# Patient Record
Sex: MALE | Race: WHITE | NOT HISPANIC OR LATINO
[De-identification: names, ages, dates, MRNs, and addresses within clinical notes are randomized per-mention and may not be internally consistent; named-entity substitution may affect disease eponyms.]

---

## 2019-07-18 ENCOUNTER — RESULT REVIEW (OUTPATIENT)
Age: 64
End: 2019-07-18

## 2020-02-20 PROBLEM — Z00.00 ENCOUNTER FOR PREVENTIVE HEALTH EXAMINATION: Status: ACTIVE | Noted: 2020-02-20

## 2020-03-11 ENCOUNTER — APPOINTMENT (OUTPATIENT)
Dept: VASCULAR SURGERY | Facility: CLINIC | Age: 65
End: 2020-03-11
Payer: MEDICARE

## 2020-03-11 VITALS
HEIGHT: 70 IN | SYSTOLIC BLOOD PRESSURE: 164 MMHG | DIASTOLIC BLOOD PRESSURE: 80 MMHG | BODY MASS INDEX: 22.9 KG/M2 | WEIGHT: 160 LBS | TEMPERATURE: 97.9 F | HEART RATE: 89 BPM

## 2020-03-11 DIAGNOSIS — Z86.79 PERSONAL HISTORY OF OTHER DISEASES OF THE CIRCULATORY SYSTEM: ICD-10-CM

## 2020-03-11 DIAGNOSIS — Z86.39 PERSONAL HISTORY OF OTHER ENDOCRINE, NUTRITIONAL AND METABOLIC DISEASE: ICD-10-CM

## 2020-03-11 DIAGNOSIS — Z71.9 COUNSELING, UNSPECIFIED: ICD-10-CM

## 2020-03-11 DIAGNOSIS — Z82.49 FAMILY HISTORY OF ISCHEMIC HEART DISEASE AND OTHER DISEASES OF THE CIRCULATORY SYSTEM: ICD-10-CM

## 2020-03-11 DIAGNOSIS — Z87.448 PERSONAL HISTORY OF OTHER DISEASES OF URINARY SYSTEM: ICD-10-CM

## 2020-03-11 PROCEDURE — 93985 DUP-SCAN HEMO COMPL BI STD: CPT

## 2020-03-11 PROCEDURE — 99203 OFFICE O/P NEW LOW 30 MIN: CPT

## 2020-03-11 NOTE — ASSESSMENT
[FreeTextEntry1] : CKD stage IV, not on HD\par \par patient need permanent dialysis access for future plan. \par His left cephalic is 2.5 mm in forearm and above 3 in upper arm. \par We will plan for a left arm AVF creation as outpatient. \par risks and benefits of this procedure were discussed and pt. agrees to proceed\par

## 2020-03-11 NOTE — HISTORY OF PRESENT ILLNESS
[FreeTextEntry1] : Patient is a 64 yo male with CKD, referred here by his nephrologist for evaluation of fistula creation. He is right handed. He has no pacemaker, permacath, currently no on dialysis.

## 2020-03-24 ENCOUNTER — APPOINTMENT (OUTPATIENT)
Dept: TRANSPLANT | Facility: CLINIC | Age: 65
End: 2020-03-24

## 2020-03-24 ENCOUNTER — APPOINTMENT (OUTPATIENT)
Dept: NEPHROLOGY | Facility: CLINIC | Age: 65
End: 2020-03-24

## 2020-03-25 ENCOUNTER — INPATIENT (INPATIENT)
Facility: HOSPITAL | Age: 65
LOS: 5 days | Discharge: HOME | End: 2020-03-31
Attending: INTERNAL MEDICINE
Payer: MEDICARE

## 2020-03-25 VITALS
TEMPERATURE: 98 F | DIASTOLIC BLOOD PRESSURE: 84 MMHG | RESPIRATION RATE: 16 BRPM | SYSTOLIC BLOOD PRESSURE: 166 MMHG | HEART RATE: 88 BPM | OXYGEN SATURATION: 100 %

## 2020-03-25 DIAGNOSIS — Z90.49 ACQUIRED ABSENCE OF OTHER SPECIFIED PARTS OF DIGESTIVE TRACT: Chronic | ICD-10-CM

## 2020-03-25 DIAGNOSIS — Z29.9 ENCOUNTER FOR PROPHYLACTIC MEASURES, UNSPECIFIED: ICD-10-CM

## 2020-03-25 DIAGNOSIS — N18.9 CHRONIC KIDNEY DISEASE, UNSPECIFIED: ICD-10-CM

## 2020-03-25 DIAGNOSIS — I10 ESSENTIAL (PRIMARY) HYPERTENSION: ICD-10-CM

## 2020-03-25 DIAGNOSIS — E11.9 TYPE 2 DIABETES MELLITUS WITHOUT COMPLICATIONS: ICD-10-CM

## 2020-03-25 DIAGNOSIS — D64.9 ANEMIA, UNSPECIFIED: ICD-10-CM

## 2020-03-25 DIAGNOSIS — N18.5 CHRONIC KIDNEY DISEASE, STAGE 5: ICD-10-CM

## 2020-03-25 DIAGNOSIS — E87.70 FLUID OVERLOAD, UNSPECIFIED: ICD-10-CM

## 2020-03-25 LAB
ALBUMIN SERPL ELPH-MCNC: 3 G/DL — LOW (ref 3.3–5)
ALP SERPL-CCNC: 103 U/L — SIGNIFICANT CHANGE UP (ref 40–120)
ALT FLD-CCNC: 18 U/L — SIGNIFICANT CHANGE UP (ref 4–41)
ANION GAP SERPL CALC-SCNC: 12 MMO/L — SIGNIFICANT CHANGE UP (ref 7–14)
AST SERPL-CCNC: 16 U/L — SIGNIFICANT CHANGE UP (ref 4–40)
BASOPHILS # BLD AUTO: 0.01 K/UL — SIGNIFICANT CHANGE UP (ref 0–0.2)
BASOPHILS NFR BLD AUTO: 0.2 % — SIGNIFICANT CHANGE UP (ref 0–2)
BILIRUB SERPL-MCNC: 0.2 MG/DL — SIGNIFICANT CHANGE UP (ref 0.2–1.2)
BUN SERPL-MCNC: 38 MG/DL — HIGH (ref 7–23)
CALCIUM SERPL-MCNC: 9 MG/DL — SIGNIFICANT CHANGE UP (ref 8.4–10.5)
CHLORIDE SERPL-SCNC: 104 MMOL/L — SIGNIFICANT CHANGE UP (ref 98–107)
CO2 SERPL-SCNC: 24 MMOL/L — SIGNIFICANT CHANGE UP (ref 22–31)
CREAT SERPL-MCNC: 5.42 MG/DL — HIGH (ref 0.5–1.3)
EOSINOPHIL # BLD AUTO: 0.08 K/UL — SIGNIFICANT CHANGE UP (ref 0–0.5)
EOSINOPHIL NFR BLD AUTO: 1.9 % — SIGNIFICANT CHANGE UP (ref 0–6)
GLUCOSE SERPL-MCNC: 223 MG/DL — HIGH (ref 70–99)
HCT VFR BLD CALC: 28.5 % — LOW (ref 39–50)
HGB BLD-MCNC: 9.1 G/DL — LOW (ref 13–17)
IMM GRANULOCYTES NFR BLD AUTO: 0.7 % — SIGNIFICANT CHANGE UP (ref 0–1.5)
LYMPHOCYTES # BLD AUTO: 0.39 K/UL — LOW (ref 1–3.3)
LYMPHOCYTES # BLD AUTO: 9.1 % — LOW (ref 13–44)
MCHC RBC-ENTMCNC: 28.3 PG — SIGNIFICANT CHANGE UP (ref 27–34)
MCHC RBC-ENTMCNC: 31.9 % — LOW (ref 32–36)
MCV RBC AUTO: 88.8 FL — SIGNIFICANT CHANGE UP (ref 80–100)
MONOCYTES # BLD AUTO: 0.27 K/UL — SIGNIFICANT CHANGE UP (ref 0–0.9)
MONOCYTES NFR BLD AUTO: 6.3 % — SIGNIFICANT CHANGE UP (ref 2–14)
NEUTROPHILS # BLD AUTO: 3.52 K/UL — SIGNIFICANT CHANGE UP (ref 1.8–7.4)
NEUTROPHILS NFR BLD AUTO: 81.8 % — HIGH (ref 43–77)
NRBC # FLD: 0 K/UL — SIGNIFICANT CHANGE UP (ref 0–0)
PLATELET # BLD AUTO: 169 K/UL — SIGNIFICANT CHANGE UP (ref 150–400)
PMV BLD: 11 FL — SIGNIFICANT CHANGE UP (ref 7–13)
POTASSIUM SERPL-MCNC: 4.5 MMOL/L — SIGNIFICANT CHANGE UP (ref 3.5–5.3)
POTASSIUM SERPL-SCNC: 4.5 MMOL/L — SIGNIFICANT CHANGE UP (ref 3.5–5.3)
PROT SERPL-MCNC: 6.1 G/DL — SIGNIFICANT CHANGE UP (ref 6–8.3)
RBC # BLD: 3.21 M/UL — LOW (ref 4.2–5.8)
RBC # FLD: 14.7 % — HIGH (ref 10.3–14.5)
SODIUM SERPL-SCNC: 140 MMOL/L — SIGNIFICANT CHANGE UP (ref 135–145)
TROPONIN T, HIGH SENSITIVITY: 40 NG/L — SIGNIFICANT CHANGE UP (ref ?–14)
TROPONIN T, HIGH SENSITIVITY: 44 NG/L — SIGNIFICANT CHANGE UP (ref ?–14)
WBC # BLD: 4.3 K/UL — SIGNIFICANT CHANGE UP (ref 3.8–10.5)
WBC # FLD AUTO: 4.3 K/UL — SIGNIFICANT CHANGE UP (ref 3.8–10.5)

## 2020-03-25 PROCEDURE — 99223 1ST HOSP IP/OBS HIGH 75: CPT

## 2020-03-25 RX ORDER — DEXTROSE 50 % IN WATER 50 %
12.5 SYRINGE (ML) INTRAVENOUS ONCE
Refills: 0 | Status: DISCONTINUED | OUTPATIENT
Start: 2020-03-25 | End: 2020-03-31

## 2020-03-25 RX ORDER — HYDRALAZINE HCL 50 MG
100 TABLET ORAL THREE TIMES A DAY
Refills: 0 | Status: DISCONTINUED | OUTPATIENT
Start: 2020-03-25 | End: 2020-03-31

## 2020-03-25 RX ORDER — GLUCAGON INJECTION, SOLUTION 0.5 MG/.1ML
1 INJECTION, SOLUTION SUBCUTANEOUS ONCE
Refills: 0 | Status: DISCONTINUED | OUTPATIENT
Start: 2020-03-25 | End: 2020-03-31

## 2020-03-25 RX ORDER — CARVEDILOL PHOSPHATE 80 MG/1
25 CAPSULE, EXTENDED RELEASE ORAL EVERY 12 HOURS
Refills: 0 | Status: DISCONTINUED | OUTPATIENT
Start: 2020-03-25 | End: 2020-03-31

## 2020-03-25 RX ORDER — ATORVASTATIN CALCIUM 80 MG/1
40 TABLET, FILM COATED ORAL AT BEDTIME
Refills: 0 | Status: DISCONTINUED | OUTPATIENT
Start: 2020-03-25 | End: 2020-03-31

## 2020-03-25 RX ORDER — AMLODIPINE BESYLATE 2.5 MG/1
10 TABLET ORAL DAILY
Refills: 0 | Status: DISCONTINUED | OUTPATIENT
Start: 2020-03-26 | End: 2020-03-31

## 2020-03-25 RX ORDER — TAMSULOSIN HYDROCHLORIDE 0.4 MG/1
0.4 CAPSULE ORAL AT BEDTIME
Refills: 0 | Status: DISCONTINUED | OUTPATIENT
Start: 2020-03-25 | End: 2020-03-31

## 2020-03-25 RX ORDER — DEXTROSE 50 % IN WATER 50 %
25 SYRINGE (ML) INTRAVENOUS ONCE
Refills: 0 | Status: DISCONTINUED | OUTPATIENT
Start: 2020-03-25 | End: 2020-03-31

## 2020-03-25 RX ORDER — DEXTROSE 50 % IN WATER 50 %
15 SYRINGE (ML) INTRAVENOUS ONCE
Refills: 0 | Status: DISCONTINUED | OUTPATIENT
Start: 2020-03-25 | End: 2020-03-31

## 2020-03-25 RX ORDER — INSULIN LISPRO 100/ML
VIAL (ML) SUBCUTANEOUS
Refills: 0 | Status: DISCONTINUED | OUTPATIENT
Start: 2020-03-25 | End: 2020-03-31

## 2020-03-25 RX ORDER — SODIUM CHLORIDE 9 MG/ML
1000 INJECTION, SOLUTION INTRAVENOUS
Refills: 0 | Status: DISCONTINUED | OUTPATIENT
Start: 2020-03-25 | End: 2020-03-31

## 2020-03-25 RX ORDER — FUROSEMIDE 40 MG
80 TABLET ORAL
Refills: 0 | Status: DISCONTINUED | OUTPATIENT
Start: 2020-03-25 | End: 2020-03-27

## 2020-03-25 RX ORDER — LEVOTHYROXINE SODIUM 125 MCG
75 TABLET ORAL DAILY
Refills: 0 | Status: DISCONTINUED | OUTPATIENT
Start: 2020-03-26 | End: 2020-03-31

## 2020-03-25 RX ADMIN — CARVEDILOL PHOSPHATE 25 MILLIGRAM(S): 80 CAPSULE, EXTENDED RELEASE ORAL at 18:44

## 2020-03-25 RX ADMIN — Medication 80 MILLIGRAM(S): at 18:45

## 2020-03-25 RX ADMIN — TAMSULOSIN HYDROCHLORIDE 0.4 MILLIGRAM(S): 0.4 CAPSULE ORAL at 22:34

## 2020-03-25 RX ADMIN — Medication 100 MILLIGRAM(S): at 18:45

## 2020-03-25 RX ADMIN — ATORVASTATIN CALCIUM 40 MILLIGRAM(S): 80 TABLET, FILM COATED ORAL at 22:33

## 2020-03-25 NOTE — ED ADULT NURSE NOTE - NSIMPLEMENTINTERV_GEN_ALL_ED
Implemented All Universal Safety Interventions:  [place] to call system. Call bell, personal items and telephone within reach. Instruct patient to call for assistance. Room bathroom lighting operational. Non-slip footwear when patient is off stretcher. Physically safe environment: no spills, clutter or unnecessary equipment. Stretcher in lowest position, wheels locked, appropriate side rails in place.

## 2020-03-25 NOTE — CONSULT NOTE ADULT - ASSESSMENT
65 yo M PMH CKD stage 5, htn, dm present with worsen weakness, LE edema, poor appetite x several weeks, noted have worsen renal function. plan to initiate HD this admission    CKD stage 5 not on HD   now with worsen renal function, fluid overload and weakness, planning to initiate HD on this admission  please call IR for permacath tmr  HD after access  sent hep panel  fluid overloaded, start lasix 80mg bid iv  monitor bmp, fluid status  consent for HD in chart. all questions answered    HTN  resume home meds  diuresis   monitor    anemia  likely sec to renal failure  check iron panel  will start epo when on hd  monitor hb    ckd-mbd  check pth and phos level  monitor 63 yo M PMH CKD stage 5, htn, dm present with worsen weakness, LE edema, poor appetite x several weeks, noted have worsen renal function. plan to initiate HD this admission    CKD stage 5 not on HD   now with worsen renal function, fluid overload and weakness, planning to initiate HD on this admission  please call IR for permacath tmrw  HD after access  sent hep panel  fluid overloaded, start lasix 80mg IV BID  monitor bmp, fluid status  consent for HD in chart. all questions answered    HTN  resume home meds  diuresis   monitor    Anemia  likely sec to renal failure  check iron panel  will start epo when on hd  monitor hb    Ckd-mbd  check pth and phos level  monitor

## 2020-03-25 NOTE — H&P ADULT - HISTORY OF PRESENT ILLNESS
64M w/CKD V, HTN, DMII p/w worsening LE edema and generalized weakness for past several weeks. Pt reports he was scheduled to get AVF for HD at end of February, however it was postponed. He follows with Dr. Ousmane metzger.   Currently laying in stretcher, denies cp, sob, palpitations, lightheadedness, dizziness, abd pain, nvd. Reports decreased urine output even in with PO Lasix.    Vital Signs Last 24 Hrs  T(C): 36.7 (25 Mar 2020 13:28), Max: 36.7 (25 Mar 2020 12:19)  T(F): 98.1 (25 Mar 2020 13:28), Max: 98.1 (25 Mar 2020 12:19)  HR: 87 (25 Mar 2020 13:28) (87 - 88)  BP: 151/89 (25 Mar 2020 13:28) (151/89 - 166/84)  BP(mean): --  RR: 16 (25 Mar 2020 13:28) (16 - 16)  SpO2: 100% (25 Mar 2020 13:28) (100% - 100%)    ERROR in vital sign flowsheet from 1:28PM, pt is NOT on NRB, he is saturating 100% on room air. Nursing notified and repeat full VS.

## 2020-03-25 NOTE — ED ADULT NURSE NOTE - OBJECTIVE STATEMENT
Pt A+OX3 c/o intermittent CP with shannan LE edema and weakness for weeks.  Says his kidneys are failing and needs dialysis.  Denies pain presently. 4+ shannan pedal edema noted.  Labs obtained and sent as ordered.  #20g SL R AC placed.  EKG done.  CM placed

## 2020-03-25 NOTE — H&P ADULT - NSHPPHYSICALEXAM_GEN_ALL_CORE
.  VITAL SIGNS:  T(C): 36.7 (03-25-20 @ 13:28), Max: 36.7 (03-25-20 @ 12:19)  T(F): 98.1 (03-25-20 @ 13:28), Max: 98.1 (03-25-20 @ 12:19)  HR: 87 (03-25-20 @ 13:28) (87 - 88)  BP: 151/89 (03-25-20 @ 13:28) (151/89 - 166/84)  BP(mean): --  RR: 16 (03-25-20 @ 13:28) (16 - 16)  SpO2: 100% (03-25-20 @ 13:28) (100% - 100%)  Wt(kg): --    PHYSICAL EXAM:    Constitutional: WDWN resting comfortably in bed; NAD  Head: NC/AT  Eyes: PERRL, EOMI, clear conjunctiva  ENT: no nasal discharge; MMM  Neck: supple  Respiratory: CTA B/L; no W/R/R  Cardiac: +S1/S2; RRR; no M/R/G  Gastrointestinal: soft, NT/ND  Extremities: WWP, no clubbing or cyanosis; +2 pitting edema LE b/l   Musculoskeletal: NROM x4; no joint swelling, tenderness or erythema  Dermatologic: skin warm, dry and intact  Neurologic: AAOx3; CNII-XII grossly intact; no focal deficits  Psychiatric: affect and characteristics of appearance, verbalizations, behaviors are appropriate

## 2020-03-25 NOTE — ED PROVIDER NOTE - OBJECTIVE STATEMENT
63 yo M PMH CKD (recent Cr 4s), getting prepared for HD p/w fatigue x several weeks.  +bilat LE swelling.  Had CP earlier but not today.  Denies syncope.  Denies fever or cough.

## 2020-03-25 NOTE — H&P ADULT - NSHPLABSRESULTS_GEN_ALL_CORE
.  LABS:                         9.1    4.30  )-----------( 169      ( 25 Mar 2020 13:10 )             28.5     03-25    140  |  104  |  38<H>  ----------------------------<  223<H>  4.5   |  24  |  5.42<H>    Ca    9.0      25 Mar 2020 13:10    TPro  6.1  /  Alb  3.0<L>  /  TBili  0.2  /  DBili  x   /  AST  16  /  ALT  18  /  AlkPhos  103  03-25                  RADIOLOGY, EKG & ADDITIONAL TESTS: Reviewed.

## 2020-03-25 NOTE — ED PROVIDER NOTE - CLINICAL SUMMARY MEDICAL DECISION MAKING FREE TEXT BOX
Mason: Pt most likely suffering from ongoing symptoms of CKD.  Will check Cr again and call pt's nephrologist.  I doubt this is ACS and pt has no CP now; will eval further with trop.

## 2020-03-25 NOTE — ED PROVIDER NOTE - ATTENDING CONTRIBUTION TO CARE
64M p/w worsening general weakness, malaise x several weeks.  Also notes his legs are more swollen for the past several days.  no SOB or cough or fever, unlikely COVID, more likely progression of chronic Kidney disease.  No vomiting or LOC.  Pt had appt to get fistula or other vascular access Mar 30 or so but was notified it was cancelled in light of amb surg getting cancelled due to coronavirus pandemic.  No distress here.  to be d/w nephr, they advised admission for initiation of HD.  VS:  unremarkable except HTN    GEN - NAD;  malaise;   A+O x3   HEAD - NC/AT     ENT - PEERL, EOMI, mucous membranes    moist , no discharge      NECK: Neck supple, non-tender without lymphadenopathy, no masses, no JVD  PULM - CTA b/l,  symmetric breath sounds  COR -  normal heart sounds    ABD - , ND, NT, soft,  BACK - no CVA tenderness, nontender spine     EXTREMS - (+)2 pitting edema, no deformity, warm and well perfused  no warmth or redness to suggest cellulitis.   SKIN - no rash    or bruising      NEUROLOGIC - alert, face symmetric, speech fluent, sensation nl, motor no focal deficit.

## 2020-03-25 NOTE — CONSULT NOTE ADULT - SUBJECTIVE AND OBJECTIVE BOX
[facility]   MD MARY LOU ABDUL DO ANAM SIDDIQUI ANGELA WONG, PA    TEL:  OFFICE: [phone]  DR MEANS CELL: [phone]  DR. YOUNG CELL: [phone]  DR. ARZATE CELL: [phone]  HEIDI LINARES CELL: [phone]    HPI:  63 yo M PMH CKD stage 5, htn, dm present with worsen weakness, LE edema, poor appetite x several weeks. pt follows up with dr. means for ckd management. advised to get AVF for dialysis.  Denies syncope, sob, cp, fever or cough.    Allergies:  No Known Allergies      PAST MEDICAL & SURGICAL HISTORY:      Home Medications Reviewed    Hospital Medications:   MEDICATIONS  (STANDING):      SOCIAL HISTORY:  Denies ETOh, Smoking,     FAMILY HISTORY:      REVIEW OF SYSTEMS:  CONSTITUTIONAL: + weakness, no fevers or chills  EYES/ENT: No visual changes;  No vertigo or throat pain   NECK: No pain or stiffness  RESPIRATORY: No cough, wheezing, hemoptysis; No shortness of breath  CARDIOVASCULAR: No chest pain or palpitations.  GASTROINTESTINAL: No abdominal or epigastric pain. No nausea, vomiting, or hematemesis; No diarrhea or constipation. No melena or hematochezia.  GENITOURINARY: No dysuria, frequency, foamy urine, urinary urgency, incontinence or hematuria  NEUROLOGICAL: + weakness  SKIN: No itching, burning, rashes, or lesions   VASCULAR: + bilateral lower extremity edema.   All other review of systems is negative unless indicated above.    VITALS:  T(F): 98.1 (03-25-20 @ 13:28), Max: 98.1 (03-25-20 @ 12:19)  HR: 87 (03-25-20 @ 13:28)  BP: 151/89 (03-25-20 @ 13:28)  RR: 16 (03-25-20 @ 13:28)  SpO2: 100% (03-25-20 @ 13:28)  Wt(kg): --        PHYSICAL EXAM:  Constitutional: NAD  HEENT: anicteric sclera, oropharynx clear, MMM  Neck: No JVD  Respiratory: CTAB, no wheezes, rales or rhonchi  Cardiovascular: S1, S2, RRR  Gastrointestinal: BS+, soft, NT/ND  Extremities: 2+ peripheral edema  Neurological: A/O x 3, no focal deficits  Psychiatric: Normal mood, normal affect  : No CVA tenderness. No pemberton.   Skin: No rashes      LABS:  03-25    140  |  104  |  38<H>  ----------------------------<  223<H>  4.5   |  24  |  5.42<H>    Ca    9.0      25 Mar 2020 13:10    TPro  6.1  /  Alb  3.0<L>  /  TBili  0.2  /  DBili      /  AST  16  /  ALT  18  /  AlkPhos  103  03-25    Creatinine Trend: 5.42 <--                        9.1    4.30  )-----------( 169      ( 25 Mar 2020 13:10 )             28.5     Urine Studies:        RADIOLOGY & ADDITIONAL STUDIES: [facility] [place] [facility]   MD MARY LOU ABDUL DO ANAM SIDDIQUI ANGELA WONG, PA    TEL:  OFFICE: [phone]  DR MEANS CELL: [phone]  DR. YOUNG CELL: [phone]  DR. ARZATE CELL: [phone]  HEIDI LINARES CELL: [phone]    HPI:  65 yo M PMH CKD stage 5, htn, dm present with worsen weakness, LE edema, poor appetite x several weeks. pt follows up with dr. means for ckd management. advised to get AVF for dialysis.  Denies syncope, sob, cp, fever or cough.    Allergies:  No Known Allergies      PAST MEDICAL & SURGICAL HISTORY:      Home Medications Reviewed    Hospital Medications:   MEDICATIONS  (STANDING):      SOCIAL HISTORY:  Denies ETOh, Smoking,     FAMILY HISTORY:      REVIEW OF SYSTEMS:  CONSTITUTIONAL: + weakness, no fevers or chills  EYES/ENT: No visual changes;  No vertigo or throat pain   NECK: No pain or stiffness  RESPIRATORY: No cough, wheezing, hemoptysis; No shortness of breath  CARDIOVASCULAR: No chest pain or palpitations.  GASTROINTESTINAL: No abdominal or epigastric pain. No nausea, vomiting, or hematemesis; No diarrhea or constipation. No melena or hematochezia.  GENITOURINARY: No dysuria, frequency, foamy urine, urinary urgency, incontinence or hematuria  NEUROLOGICAL: + weakness  SKIN: No itching, burning, rashes, or lesions   VASCULAR: + bilateral lower extremity edema.   All other review of systems is negative unless indicated above.    VITALS:  T(F): 98.1 (03-25-20 @ 13:28), Max: 98.1 (03-25-20 @ 12:19)  HR: 87 (03-25-20 @ 13:28)  BP: 151/89 (03-25-20 @ 13:28)  RR: 16 (03-25-20 @ 13:28)  SpO2: 100% (03-25-20 @ 13:28)  Wt(kg): --        PHYSICAL EXAM:  Constitutional: NAD  HEENT: anicteric sclera, oropharynx clear, MMM  Neck: No JVD  Respiratory: CTAB, no wheezes, rales or rhonchi  Cardiovascular: S1, S2, RRR  Gastrointestinal: BS+, soft, NT/ND  Extremities: 2+ peripheral edema  Neurological: A/O x 3, no focal deficits  Psychiatric: Normal mood, normal affect  : No CVA tenderness. No pemberton.   Skin: No rashes      LABS:  03-25    140  |  104  |  38<H>  ----------------------------<  223<H>  4.5   |  24  |  5.42<H>    Ca    9.0      25 Mar 2020 13:10    TPro  6.1  /  Alb  3.0<L>  /  TBili  0.2  /  DBili      /  AST  16  /  ALT  18  /  AlkPhos  103  03-25    Creatinine Trend: 5.42 <--                        9.1    4.30  )-----------( 169      ( 25 Mar 2020 13:10 )             28.5

## 2020-03-25 NOTE — H&P ADULT - NSICDXPASTMEDICALHX_GEN_ALL_CORE_FT
PAST MEDICAL HISTORY:  Chronic kidney disease (CKD), stage V     Essential hypertension     Type II diabetes mellitus

## 2020-03-25 NOTE — CHART NOTE - NSCHARTNOTEFT_GEN_A_CORE
PRE-INTERVENTIONAL RADIOLOGY PROCEDURE NOTE    Patient Age: 64   Patient Gender: male  Procedure (including site / side if known): permacath  Diagnosis / Indication: ESRD, HTN, DM   Interventional Radiology Attending Physician: Dr Ho  Ordering Attending Physician: Dr Ramos (Dr Johnston will take over in AM)   Pertinent medical history: ESRD   Pertinent labs: platelet 169   Patient and Family aware? Yes      Attending / Resident / NP / PA   Print Sign Jessica Mcneill NP   Contact #:  [phone]

## 2020-03-25 NOTE — H&P ADULT - ASSESSMENT
64M w/CKD V, HTN, DMII p/w worsening LE edema and generalized weakness, admitted for HD initiation, plan for permacath with IR in AM.

## 2020-03-26 ENCOUNTER — TRANSCRIPTION ENCOUNTER (OUTPATIENT)
Age: 65
End: 2020-03-26

## 2020-03-26 LAB
ANION GAP SERPL CALC-SCNC: 13 MMO/L — SIGNIFICANT CHANGE UP (ref 7–14)
BASOPHILS # BLD AUTO: 0.01 K/UL — SIGNIFICANT CHANGE UP (ref 0–0.2)
BASOPHILS NFR BLD AUTO: 0.3 % — SIGNIFICANT CHANGE UP (ref 0–2)
BUN SERPL-MCNC: 39 MG/DL — HIGH (ref 7–23)
CALCIUM SERPL-MCNC: 8.1 MG/DL — LOW (ref 8.4–10.5)
CHLORIDE SERPL-SCNC: 104 MMOL/L — SIGNIFICANT CHANGE UP (ref 98–107)
CO2 SERPL-SCNC: 23 MMOL/L — SIGNIFICANT CHANGE UP (ref 22–31)
CREAT SERPL-MCNC: 5.49 MG/DL — HIGH (ref 0.5–1.3)
EOSINOPHIL # BLD AUTO: 0.1 K/UL — SIGNIFICANT CHANGE UP (ref 0–0.5)
EOSINOPHIL NFR BLD AUTO: 2.7 % — SIGNIFICANT CHANGE UP (ref 0–6)
FERRITIN SERPL-MCNC: 158.8 NG/ML — SIGNIFICANT CHANGE UP (ref 30–400)
GLUCOSE SERPL-MCNC: 109 MG/DL — HIGH (ref 70–99)
HBV SURFACE AG SER-ACNC: NEGATIVE — SIGNIFICANT CHANGE UP
HCT VFR BLD CALC: 24.4 % — LOW (ref 39–50)
HGB BLD-MCNC: 7.6 G/DL — LOW (ref 13–17)
IMM GRANULOCYTES NFR BLD AUTO: 0.5 % — SIGNIFICANT CHANGE UP (ref 0–1.5)
INR BLD: 0.97 — SIGNIFICANT CHANGE UP (ref 0.88–1.17)
IRON SATN MFR SERPL: 170 UG/DL — SIGNIFICANT CHANGE UP (ref 155–535)
IRON SATN MFR SERPL: 26 UG/DL — LOW (ref 45–165)
LYMPHOCYTES # BLD AUTO: 0.42 K/UL — LOW (ref 1–3.3)
LYMPHOCYTES # BLD AUTO: 11.5 % — LOW (ref 13–44)
MAGNESIUM SERPL-MCNC: 2.7 MG/DL — HIGH (ref 1.6–2.6)
MCHC RBC-ENTMCNC: 28 PG — SIGNIFICANT CHANGE UP (ref 27–34)
MCHC RBC-ENTMCNC: 31.1 % — LOW (ref 32–36)
MCV RBC AUTO: 90 FL — SIGNIFICANT CHANGE UP (ref 80–100)
MONOCYTES # BLD AUTO: 0.29 K/UL — SIGNIFICANT CHANGE UP (ref 0–0.9)
MONOCYTES NFR BLD AUTO: 8 % — SIGNIFICANT CHANGE UP (ref 2–14)
NEUTROPHILS # BLD AUTO: 2.8 K/UL — SIGNIFICANT CHANGE UP (ref 1.8–7.4)
NEUTROPHILS NFR BLD AUTO: 77 % — SIGNIFICANT CHANGE UP (ref 43–77)
NRBC # FLD: 0 K/UL — SIGNIFICANT CHANGE UP (ref 0–0)
PHOSPHATE SERPL-MCNC: 5.7 MG/DL — HIGH (ref 2.5–4.5)
PLATELET # BLD AUTO: 148 K/UL — LOW (ref 150–400)
PMV BLD: 11.1 FL — SIGNIFICANT CHANGE UP (ref 7–13)
POTASSIUM SERPL-MCNC: 3.7 MMOL/L — SIGNIFICANT CHANGE UP (ref 3.5–5.3)
POTASSIUM SERPL-SCNC: 3.7 MMOL/L — SIGNIFICANT CHANGE UP (ref 3.5–5.3)
PROTHROM AB SERPL-ACNC: 11.2 SEC — SIGNIFICANT CHANGE UP (ref 9.8–13.1)
PTH-INTACT SERPL-MCNC: 32.53 PG/ML — SIGNIFICANT CHANGE UP (ref 15–65)
RBC # BLD: 2.71 M/UL — LOW (ref 4.2–5.8)
RBC # FLD: 14.6 % — HIGH (ref 10.3–14.5)
SODIUM SERPL-SCNC: 140 MMOL/L — SIGNIFICANT CHANGE UP (ref 135–145)
UIBC SERPL-MCNC: 144.3 UG/DL — SIGNIFICANT CHANGE UP (ref 110–370)
WBC # BLD: 3.64 K/UL — LOW (ref 3.8–10.5)
WBC # FLD AUTO: 3.64 K/UL — LOW (ref 3.8–10.5)

## 2020-03-26 PROCEDURE — 76937 US GUIDE VASCULAR ACCESS: CPT | Mod: 26

## 2020-03-26 PROCEDURE — 77001 FLUOROGUIDE FOR VEIN DEVICE: CPT | Mod: 26,GC

## 2020-03-26 RX ORDER — IRON SUCROSE 20 MG/ML
100 INJECTION, SOLUTION INTRAVENOUS
Refills: 0 | Status: DISCONTINUED | OUTPATIENT
Start: 2020-03-26 | End: 2020-03-31

## 2020-03-26 RX ADMIN — IRON SUCROSE 100 MILLIGRAM(S): 20 INJECTION, SOLUTION INTRAVENOUS at 18:47

## 2020-03-26 RX ADMIN — Medication 100 MILLIGRAM(S): at 02:43

## 2020-03-26 RX ADMIN — CARVEDILOL PHOSPHATE 25 MILLIGRAM(S): 80 CAPSULE, EXTENDED RELEASE ORAL at 06:25

## 2020-03-26 RX ADMIN — Medication 100 MILLIGRAM(S): at 10:17

## 2020-03-26 RX ADMIN — Medication 2: at 21:58

## 2020-03-26 RX ADMIN — Medication 75 MICROGRAM(S): at 06:24

## 2020-03-26 RX ADMIN — AMLODIPINE BESYLATE 10 MILLIGRAM(S): 2.5 TABLET ORAL at 06:25

## 2020-03-26 RX ADMIN — Medication 80 MILLIGRAM(S): at 06:25

## 2020-03-26 RX ADMIN — Medication 0.1 MILLIGRAM(S): at 10:17

## 2020-03-26 RX ADMIN — Medication 0.1 MILLIGRAM(S): at 00:22

## 2020-03-26 NOTE — PROGRESS NOTE ADULT - SUBJECTIVE AND OBJECTIVE BOX
[facility] [place] [facility]   MD MARY LOU ABDUL DO ANAM SIDDIQUI ANGELA WONG, PA    TEL:  OFFICE: [phone]  DR CHIN CELL: [phone]  DR. YOUNG CELL: [phone]  DR. ARZATE CELL: [phone]  HEIDI LINARES CELL: [phone]        Patient is a 64y old  Male who presents with a chief complaint of weakness, HD initiation (26 Mar 2020 11:23)      Patient seen and examined at bedside. No chest pain/sob    VITALS:  T(F): 98.6 (03-26-20 @ 14:37), Max: 99.1 (03-26-20 @ 06:10)  HR: 79 (03-26-20 @ 14:37)  BP: 157/79 (03-26-20 @ 14:37)  RR: 19 (03-26-20 @ 14:37)  SpO2: 98% (03-26-20 @ 14:37)  Wt(kg): --    03-26 @ 07:01  -  03-26 @ 14:45  --------------------------------------------------------  IN: 0 mL / OUT: 250 mL / NET: -250 mL      Height (cm): 177.8 (03-25 @ 18:21)  Weight (kg): 75 (03-25 @ 18:21)  BMI (kg/m2): 23.7 (03-25 @ 18:21)  BSA (m2): 1.93 (03-25 @ 18:21)    PHYSICAL EXAM:  Constitutional: NAD  Neck: No JVD  Respiratory: CTAB, no wheezes, rales or rhonchi  Cardiovascular: S1, S2, RRR  Gastrointestinal: BS+, soft, NT/ND  Extremities: 1+ peripheral edema    Hospital Medications:   MEDICATIONS  (STANDING):  amLODIPine   Tablet 10 milliGRAM(s) Oral daily  atorvastatin 40 milliGRAM(s) Oral at bedtime  carvedilol 25 milliGRAM(s) Oral every 12 hours  cloNIDine 0.1 milliGRAM(s) Oral two times a day  dextrose 5%. 1000 milliLiter(s) (50 mL/Hr) IV Continuous <Continuous>  dextrose 50% Injectable 12.5 Gram(s) IV Push once  dextrose 50% Injectable 25 Gram(s) IV Push once  dextrose 50% Injectable 25 Gram(s) IV Push once  furosemide   Injectable 80 milliGRAM(s) IV Push two times a day  hydrALAZINE 100 milliGRAM(s) Oral three times a day  insulin lispro (HumaLOG) corrective regimen sliding scale   SubCutaneous Before meals and at bedtime  iron sucrose Injectable 100 milliGRAM(s) IV Push <User Schedule>  levothyroxine 75 MICROGram(s) Oral daily  tamsulosin 0.4 milliGRAM(s) Oral at bedtime      LABS:  03-26    140  |  104  |  39<H>  ----------------------------<  109<H>  3.7   |  23  |  5.49<H>    Ca    8.1<L>      26 Mar 2020 05:50  Phos  5.7     03-26  Mg     2.7     03-26    TPro  6.1  /  Alb  3.0<L>  /  TBili  0.2  /  DBili      /  AST  16  /  ALT  18  /  AlkPhos  103  03-25    Creatinine Trend: 5.49 <--, 5.42 <--    Phosphorus Level, Serum: 5.7 mg/dL (03-26 @ 05:50)  Iron Total, Serum: 26 ug/dL (03-26 @ 05:50)  Ferritin, Serum: 158.8 ng/mL (03-26 @ 05:50)    calcium--  intact pth--  parathyroid hormone intact, serum32.53                            7.6    3.64  )-----------( 148      ( 26 Mar 2020 05:50 )             24.4     Urine Studies:      Iron 26, TIBC 170, %sat --      [03-26-20 @ 05:50]  Ferritin 158.8      [03-26-20 @ 05:50]  PTH 32.53 (Ca --)      [03-26-20 @ 05:50]   --        RADIOLOGY & ADDITIONAL STUDIES:

## 2020-03-26 NOTE — DISCHARGE NOTE PROVIDER - HOSPITAL COURSE
HPI:    64M w/CKD V, HTN, DMII p/w worsening LE edema and generalized weakness for past several weeks. Pt reports he was scheduled to get AVF for HD at end of February, however it was postponed. He follows with Dr. Ousmane metzger.     Currently laying in stretcher, denies cp, sob, palpitations, lightheadedness, dizziness, abd pain, nvd. Reports decreased urine output even in with PO Lasix.            Hospital Course: HPI:    64M w/CKD V, HTN, DMII p/w worsening LE edema and generalized weakness for past several weeks. Pt reports he was scheduled to get AVF for HD at end of February, however it was postponed. He follows with Dr. Ousmane metzger.     Currently laying in stretcher, denies cp, sob, palpitations, lightheadedness, dizziness, abd pain, nvd. Reports decreased urine output even in with PO Lasix.            Hospital Course:    Patient had Permacath placed by IR; he tolerated several sessions of HD following Permacath placement. For LE edema, he was initially started on Lasix 80mg IVP BID, but was switched to 80mg Lasix PO once daily after undergoing sessions of dialysis. Patient was followed by nephrology and cardiology during this admission. TTE was performed showing preserved LV function.         Patient now ready for discharge after Permacath placement with initiation of hemodialysis. He tolerated IR procedure and HD; he will be discharged with Lasix 80mg PO once daily and dialysis has been set up on ____ schedule at _____. Patient informed to have close follow up with his nephrologists and PMD. HPI:    64M w/CKD V, HTN, DMII p/w worsening LE edema and generalized weakness for past several weeks. Pt reports he was scheduled to get AVF for HD at end of February, however it was postponed. He follows with Dr. Ousmane metzger.     Currently laying in stretcher, denies cp, sob, palpitations, lightheadedness, dizziness, abd pain, nvd. Reports decreased urine output even in with PO Lasix.            Hospital Course:    Patient had Permacath placed by IR; he tolerated several sessions of HD following Permacath placement. For LE edema, he was initially started on Lasix 80mg IVP BID, but was switched to 80mg Lasix PO once daily after undergoing sessions of dialysis. Patient was followed by nephrology and cardiology during this admission. TTE was performed showing preserved LV function.         Patient now ready for discharge after Permacath placement with initiation of hemodialysis. He tolerated IR procedure and HD; he will be discharged with Lasix 80mg PO once daily and dialysis has been set up. Patient informed to have close follow up with his nephrologists and PMD. HPI:    64M w/CKD V, HTN, DMII p/w worsening LE edema and generalized weakness for past several weeks. Pt reports he was scheduled to get AVF for HD at end of February, however it was postponed. He follows with Dr. Ousmane metzger.     Currently laying in stretcher, denies cp, sob, palpitations, lightheadedness, dizziness, abd pain, nvd. Reports decreased urine output even in with PO Lasix.            Hospital Course:    Patient had Permacath placed by IR; he tolerated several sessions of HD following Permacath placement. For LE edema, he was initially started on Lasix 80mg IVP BID, but was switched to 80mg Lasix PO once daily after undergoing sessions of dialysis. Patient was followed by nephrology and cardiology during this admission. TTE was performed showing preserved LV function.         Patient now ready for discharge after Permacath placement with initiation of hemodialysis. He tolerated IR procedure and HD; he will be discharged with Lasix 80mg PO once daily and dialysis was set up at [facility], [address], [place], [place], [address]. [phone] on Tuesday, Thursday, Saturday at 2:45 pm.         Patient informed to have close follow up with his nephrologists and PMD.

## 2020-03-26 NOTE — CONSULT NOTE ADULT - SUBJECTIVE AND OBJECTIVE BOX
Chace Andrews MD  Interventional Cardiology / Advance Heart Failure and Cardiac Transplant Specialist  [place] Office : [phone] [place] [address] [place]. [address]  Tel:   [place] Office : [address] [facility] [place] [address]  Tel: [phone]  Cell : [phone]      HISTORY OF PRESENTING ILLNESS:  HPI:  64M w/CKD V, HTN, DMII p/w worsening LE edema and generalized weakness for past several weeks. Pt reports he was scheduled to get AVF for HD at end of February, however it was postponed. He follows with Dr. Romeo outpt.   Currently laying in stretcher, denies cp, sob, palpitations, lightheadedness, dizziness, abd pain, nvd. Reports decreased urine output even in with PO Lasix. no cp no SOB      PAST MEDICAL & SURGICAL HISTORY:  Type II diabetes mellitus  Essential hypertension  Chronic kidney disease (CKD), stage V  S/P appendectomy      SOCIAL HISTORY: Substance Use (street drugs): ( x ) never used  (  ) other:    FAMILY HISTORY:      MEDICATIONS:  amLODIPine   Tablet 10 milliGRAM(s) Oral daily  carvedilol 25 milliGRAM(s) Oral every 12 hours  cloNIDine 0.1 milliGRAM(s) Oral two times a day  furosemide   Injectable 80 milliGRAM(s) IV Push two times a day  hydrALAZINE 100 milliGRAM(s) Oral three times a day  tamsulosin 0.4 milliGRAM(s) Oral at bedtime  atorvastatin 40 milliGRAM(s) Oral at bedtime  dextrose 40% Gel 15 Gram(s) Oral once PRN  dextrose 50% Injectable 12.5 Gram(s) IV Push once  dextrose 50% Injectable 25 Gram(s) IV Push once  dextrose 50% Injectable 25 Gram(s) IV Push once  glucagon  Injectable 1 milliGRAM(s) IntraMuscular once PRN  insulin lispro (HumaLOG) corrective regimen sliding scale   SubCutaneous Before meals and at bedtime  levothyroxine 75 MICROGram(s) Oral daily    dextrose 5%. 1000 milliLiter(s) IV Continuous <Continuous>  iron sucrose Injectable 100 milliGRAM(s) IV Push <User Schedule>      FAMILY HISTORY:        Allergies    No Known Allergies    Intolerances    	      PHYSICAL EXAM:  T(C): 37.2 (03-26-20 @ 19:30), Max: 37.3 (03-26-20 @ 06:10)  HR: 92 (03-26-20 @ 19:30) (79 - 92)  BP: 160/90 (03-26-20 @ 19:30) (142/68 - 160/90)  RR: 18 (03-26-20 @ 19:30) (17 - 22)  SpO2: 98% (03-26-20 @ 14:37) (92% - 98%)  Wt(kg): --  I&O's Summary    26 Mar 2020 07:01  -  26 Mar 2020 21:37  --------------------------------------------------------  IN: 400 mL / OUT: 1650 mL / NET: -1250 mL          EYES: EOMI, PERRLA, conjunctiva and sclera clear  ENMT: No tonsillar erythema, exudates, or enlargement; Moist mucous membranes, Good dentition, No lesions  Cardiovascular: Normal S1 S2, No JVD, No murmurs, No edema  Respiratory: Lungs clear to auscultation	  Gastrointestinal:  Soft, Non-tender, + BS	  Extremities: 2+ edema      LABS:	 	    CARDIAC MARKERS:                                  7.6    3.64  )-----------( 148      ( 26 Mar 2020 05:50 )             24.4     03-26    140  |  104  |  39<H>  ----------------------------<  109<H>  3.7   |  23  |  5.49<H>    Ca    8.1<L>      26 Mar 2020 05:50  Phos  5.7     03-26  Mg     2.7     03-26    TPro  6.1  /  Alb  3.0<L>  /  TBili  0.2  /  DBili  x   /  AST  16  /  ALT  18  /  AlkPhos  103  03-25    proBNP:   Lipid Profile:   HgA1c:   TSH:     Consultant(s) Notes Reviewed:  [x ] YES  [ ] NO    Care Discussed with Consultants/Other Providers [ x] YES  [ ] NO    Imaging Personally Reviewed independently:  [x] YES  [ ] NO    All labs, radiologic studies, vitals, orders and medications list reviewed. Patient is seen and examined at bedside. Case discussed with medical team.    ASSESSMENT/PLAN:

## 2020-03-26 NOTE — PROGRESS NOTE ADULT - SUBJECTIVE AND OBJECTIVE BOX
Saravanan Viveros MD  Internal Medicine, PGY-1  Beeper: [phone] ([facility])/ [id number] ([facility])     Subjective:    Patient is a 64y old  Male who presents with a chief complaint of weakness, HD initiation (25 Mar 2020 16:33)    No acute overnight events.  Patient was seen and examined at bedside this AM. Denied fever, chills, nausea, vomiting, CP, palpitations, coughing, wheezing, SOB, and abdominal pain.      MEDICATIONS  (STANDING):  amLODIPine   Tablet 10 milliGRAM(s) Oral daily  atorvastatin 40 milliGRAM(s) Oral at bedtime  carvedilol 25 milliGRAM(s) Oral every 12 hours  cloNIDine 0.1 milliGRAM(s) Oral two times a day  dextrose 5%. 1000 milliLiter(s) (50 mL/Hr) IV Continuous <Continuous>  dextrose 50% Injectable 12.5 Gram(s) IV Push once  dextrose 50% Injectable 25 Gram(s) IV Push once  dextrose 50% Injectable 25 Gram(s) IV Push once  furosemide   Injectable 80 milliGRAM(s) IV Push two times a day  hydrALAZINE 100 milliGRAM(s) Oral three times a day  insulin lispro (HumaLOG) corrective regimen sliding scale   SubCutaneous Before meals and at bedtime  levothyroxine 75 MICROGram(s) Oral daily  tamsulosin 0.4 milliGRAM(s) Oral at bedtime    MEDICATIONS  (PRN):  dextrose 40% Gel 15 Gram(s) Oral once PRN Blood Glucose LESS THAN 70 milliGRAM(s)/deciliter  glucagon  Injectable 1 milliGRAM(s) IntraMuscular once PRN Glucose LESS THAN 70 milligrams/deciliter      Objective:    Vitals: Vital Signs Last 24 Hrs  T(C): 37.3 (03-26-20 @ 06:10), Max: 37.3 (03-26-20 @ 06:10)  T(F): 99.1 (03-26-20 @ 06:10), Max: 99.1 (03-26-20 @ 06:10)  HR: 82 (03-26-20 @ 06:10) (82 - 90)  BP: 144/70 (03-26-20 @ 06:10) (144/70 - 170/80)  BP(mean): --  RR: 18 (03-26-20 @ 06:10) (16 - 22)  SpO2: 94% (03-26-20 @ 06:10) (94% - 100%)              I&O's Summary        PHYSICAL EXAM:  GENERAL: NAD, well-groomed, well-developed  HEAD:  Atraumatic, Normocephalic  EYES: PERRLA, conjunctiva and sclera clear  ENMT: No tonsillar erythema, exudates, or enlargement; Moist mucous membranes, Good dentition, No lesions  NECK: Supple, No JVD, Normal thyroid  CHEST/LUNG: Clear to auscultation bilaterally; No rales, rhonchi, wheezing, or rubs  HEART: Regular rate and rhythm; No murmurs, rubs, or gallops  ABDOMEN: Soft, Nontender, Nondistended; Bowel sounds present  EXTREMITIES:  2+ Peripheral Pulses, No clubbing, cyanosis, or edema  LYMPH: No lymphadenopathy noted  SKIN: No rashes or lesions noted  NERVOUS SYSTEM:  Alert & Oriented X3, Good concentration; Motor Strength 5/5 B/L upper and lower extremities; DTRs 2+ intact and symmetric                                               LABS:  03-25    140  |  104  |  38<H>  ----------------------------<  223<H>  4.5   |  24  |  5.42<H>    Ca    9.0      25 Mar 2020 13:10  Phos  5.7     03-26    TPro  6.1  /  Alb  3.0<L>  /  TBili  0.2  /  DBili  x   /  AST  16  /  ALT  18  /  AlkPhos  103  03-25      PT/INR - ( 26 Mar 2020 05:50 )   PT: 11.2 SEC;   INR: 0.97                                                        7.6    3.64  )-----------( 148      ( 26 Mar 2020 05:50 )             24.4                         9.1    4.30  )-----------( 169      ( 25 Mar 2020 13:10 )             28.5     CAPILLARY BLOOD GLUCOSE      POCT Blood Glucose.: 99 mg/dL (26 Mar 2020 07:08)  POCT Blood Glucose.: 189 mg/dL (26 Mar 2020 00:36)  POCT Blood Glucose.: 127 mg/dL (25 Mar 2020 18:18)        RECENT CULTURES:      TELEMETRY:    ECG:    TTE:    RADIOLOGY & ADDITIONAL TESTS:    Imaging Personally Reviewed:  [ ] YES  [ ] NO    Consultants involved in case:   Consultant(s) Notes Reviewed:  [ ] YES  [ ] NO:   Care Discussed with Consultants/Other Providers [ ] YES  [ ] NO Saravanan Viveros MD  Internal Medicine, PGY-1  Beeper: [phone] ([facility])/ [phone] ([facility])     Subjective:    Patient is a 64y old  Male who presents with a chief complaint of weakness, HD initiation (25 Mar 2020 16:33)    No acute overnight events. NPO after midnight for Permacath with IR today.     Patient was seen and examined at bedside this AM. States he feel "okay", reported no complaints. States he is still urinating; has increased since being started on IVP Lasix on admission. Denied fatigue, fever, chills, nausea, vomiting, CP, palpitations, coughing, wheezing, SOB, and abdominal pain.      MEDICATIONS  (STANDING):  amLODIPine   Tablet 10 milliGRAM(s) Oral daily  atorvastatin 40 milliGRAM(s) Oral at bedtime  carvedilol 25 milliGRAM(s) Oral every 12 hours  cloNIDine 0.1 milliGRAM(s) Oral two times a day  dextrose 5%. 1000 milliLiter(s) (50 mL/Hr) IV Continuous <Continuous>  dextrose 50% Injectable 12.5 Gram(s) IV Push once  dextrose 50% Injectable 25 Gram(s) IV Push once  dextrose 50% Injectable 25 Gram(s) IV Push once  furosemide   Injectable 80 milliGRAM(s) IV Push two times a day  hydrALAZINE 100 milliGRAM(s) Oral three times a day  insulin lispro (HumaLOG) corrective regimen sliding scale   SubCutaneous Before meals and at bedtime  levothyroxine 75 MICROGram(s) Oral daily  tamsulosin 0.4 milliGRAM(s) Oral at bedtime    MEDICATIONS  (PRN):  dextrose 40% Gel 15 Gram(s) Oral once PRN Blood Glucose LESS THAN 70 milliGRAM(s)/deciliter  glucagon  Injectable 1 milliGRAM(s) IntraMuscular once PRN Glucose LESS THAN 70 milligrams/deciliter      Objective:    Vitals: Vital Signs Last 24 Hrs  T(C): 37.3 (03-26-20 @ 06:10), Max: 37.3 (03-26-20 @ 06:10)  T(F): 99.1 (03-26-20 @ 06:10), Max: 99.1 (03-26-20 @ 06:10)  HR: 82 (03-26-20 @ 06:10) (82 - 90)  BP: 144/70 (03-26-20 @ 06:10) (144/70 - 170/80)  RR: 18 (03-26-20 @ 06:10) (16 - 22)  SpO2: 94% (03-26-20 @ 06:10) (94% - 100%)                    PHYSICAL EXAM:  GENERAL: Elderly male sleeping comfortably in bed in NAD  HEAD:  Atraumatic, Normocephalic  EYES: conjunctiva and sclera clear  CHEST/LUNG: Clear to auscultation bilaterally; No rales, rhonchi, wheezing, or rubs  HEART: Regular rate and rhythm; No murmurs, rubs, or gallops  ABDOMEN: Soft, Nontender, Nondistended; Bowel sounds present  EXTREMITIES: b/l pitting LE edema  NERVOUS SYSTEM: awake and alert              LABS:  Phos  5.7     03-26  PT/INR - ( 26 Mar 2020 05:50 )   PT: 11.2 SEC;   INR: 0.97                             7.6    3.64  )-----------( 148      ( 26 Mar 2020 05:50 )             24.4                CAPILLARY BLOOD GLUCOSE  POCT Blood Glucose.: 99 mg/dL (26 Mar 2020 07:08)  POCT Blood Glucose.: 189 mg/dL (26 Mar 2020 00:36)  POCT Blood Glucose.: 127 mg/dL (25 Mar 2020 18:18) Saravanan Viveros MD  Internal Medicine, PGY-1  Beeper: [phone] ([facility])/ [phone] ([facility])     Subjective:    Patient is a 64y old  Male who presents with a chief complaint of weakness, HD initiation (25 Mar 2020 16:33)    No acute overnight events. NPO after midnight for Permacath with IR today.     Patient was seen and examined at bedside this AM. States he feel "okay", reported no complaints. States he is still urinating; has increased since being started on IVP Lasix on admission. Denied fatigue, fever, chills, nausea, vomiting, CP, palpitations, coughing, wheezing, SOB, and abdominal pain.      MEDICATIONS  (STANDING):  amLODIPine   Tablet 10 milliGRAM(s) Oral daily  atorvastatin 40 milliGRAM(s) Oral at bedtime  carvedilol 25 milliGRAM(s) Oral every 12 hours  cloNIDine 0.1 milliGRAM(s) Oral two times a day  dextrose 5%. 1000 milliLiter(s) (50 mL/Hr) IV Continuous <Continuous>  dextrose 50% Injectable 12.5 Gram(s) IV Push once  dextrose 50% Injectable 25 Gram(s) IV Push once  dextrose 50% Injectable 25 Gram(s) IV Push once  furosemide   Injectable 80 milliGRAM(s) IV Push two times a day  hydrALAZINE 100 milliGRAM(s) Oral three times a day  insulin lispro (HumaLOG) corrective regimen sliding scale   SubCutaneous Before meals and at bedtime  levothyroxine 75 MICROGram(s) Oral daily  tamsulosin 0.4 milliGRAM(s) Oral at bedtime    MEDICATIONS  (PRN):  dextrose 40% Gel 15 Gram(s) Oral once PRN Blood Glucose LESS THAN 70 milliGRAM(s)/deciliter  glucagon  Injectable 1 milliGRAM(s) IntraMuscular once PRN Glucose LESS THAN 70 milligrams/deciliter      Objective:    Vitals: Vital Signs Last 24 Hrs  T(C): 37.3 (03-26-20 @ 06:10), Max: 37.3 (03-26-20 @ 06:10)  T(F): 99.1 (03-26-20 @ 06:10), Max: 99.1 (03-26-20 @ 06:10)  HR: 82 (03-26-20 @ 06:10) (82 - 90)  BP: 144/70 (03-26-20 @ 06:10) (144/70 - 170/80)  RR: 18 (03-26-20 @ 06:10) (16 - 22)  SpO2: 94% (03-26-20 @ 06:10) (94% - 100%)                    PHYSICAL EXAM:  GENERAL: Elderly male sleeping comfortably in bed in NAD  HEAD:  Atraumatic, Normocephalic  EYES: conjunctiva and sclera clear  CHEST/LUNG: Clear to auscultation bilaterally; No rales, rhonchi, wheezing, or rubs  HEART: Regular rate and rhythm; No murmurs, rubs, or gallops  ABDOMEN: Soft, Nontender, Nondistended; Bowel sounds present  EXTREMITIES: b/l pitting LE edema  NERVOUS SYSTEM: awake and alert              LABS:  03-26    140  |  104  |  39<H>  ----------------------------<  109<H>  3.7   |  23  |  5.49<H>    Ca    8.1<L>      26 Mar 2020 05:50  Phos  5.7     03-26  Mg     2.7     03-26  PT/INR - ( 26 Mar 2020 05:50 )   PT: 11.2 SEC;   INR: 0.97                             7.6    3.64  )-----------( 148      ( 26 Mar 2020 05:50 )             24.4                CAPILLARY BLOOD GLUCOSE  POCT Blood Glucose.: 99 mg/dL (26 Mar 2020 07:08)  POCT Blood Glucose.: 189 mg/dL (26 Mar 2020 00:36)  POCT Blood Glucose.: 127 mg/dL (25 Mar 2020 18:18)

## 2020-03-26 NOTE — DISCHARGE NOTE PROVIDER - NSDCMRMEDTOKEN_GEN_ALL_CORE_FT
cloNIDine 0.1 mg oral tablet: 1 tab(s) orally 2 times a day  Coreg 25 mg oral tablet: 1 tab(s) orally 2 times a day  Flomax 0.4 mg oral capsule: 1 cap(s) orally once a day  GlipiZIDE XL 5 mg oral tablet, extended release: 1 tab(s) orally 2 times a day  hydrALAZINE 100 mg oral tablet: 1 tab(s) orally 3 times a day  Januvia 50 mg oral tablet: 1 tab(s) orally once a day  Lasix 40 mg oral tablet: 1 tab(s) orally once a day  Lipitor 40 mg oral tablet: 1 tab(s) orally once a day  Norvasc 10 mg oral tablet: 1 tab(s) orally once a day  Synthroid 75 mcg (0.075 mg) oral tablet: 1 tab(s) orally once a day cloNIDine 0.1 mg oral tablet: 1 tab(s) orally 2 times a day  Coreg 25 mg oral tablet: 1 tab(s) orally 2 times a day  GlipiZIDE XL 5 mg oral tablet, extended release: 1 tab(s) orally 2 times a day  hydrALAZINE 100 mg oral tablet: 1 tab(s) orally 3 times a day  Januvia 50 mg oral tablet: 1 tab(s) orally once a day  Lipitor 40 mg oral tablet: 1 tab(s) orally once a day  Norvasc 10 mg oral tablet: 1 tab(s) orally once a day  Synthroid 75 mcg (0.075 mg) oral tablet: 1 tab(s) orally once a day  tamsulosin 0.4 mg oral capsule: 1 cap(s) orally once a day (at bedtime) cloNIDine 0.1 mg oral tablet: 1 tab(s) orally 2 times a day  Coreg 25 mg oral tablet: 1 tab(s) orally 2 times a day  furosemide 80 mg oral tablet: 1 tab(s) orally once a day  GlipiZIDE XL 5 mg oral tablet, extended release: 1 tab(s) orally 2 times a day  hydrALAZINE 100 mg oral tablet: 1 tab(s) orally 3 times a day  Januvia 50 mg oral tablet: 1 tab(s) orally once a day  Lipitor 40 mg oral tablet: 1 tab(s) orally once a day  Norvasc 10 mg oral tablet: 1 tab(s) orally once a day  Synthroid 75 mcg (0.075 mg) oral tablet: 1 tab(s) orally once a day  tamsulosin 0.4 mg oral capsule: 1 cap(s) orally once a day (at bedtime)

## 2020-03-26 NOTE — DISCHARGE NOTE PROVIDER - CARE PROVIDER_API CALL
Samara Downs (DO)  Nephrology  [address] [place] [address]  Phone: [phone]  Fax: [phone]  Follow Up Time:     Burak Cha  [address] [address] [place] [address]  Phone: [phone]  Fax: (   )    -  Follow Up Time:

## 2020-03-26 NOTE — CONSULT NOTE ADULT - ASSESSMENT
EKG - NSR     A/P     1) LE edema - likley sec to worsening renal function , will get 2d echo to r/o cardiomyopathy cont lasix 80mg q12 for permacath     2) HTN - cont clonidine coreg, amlodipine and hydralzaine    3) CKD - for permacath and HD f/u renal

## 2020-03-26 NOTE — PROGRESS NOTE ADULT - ASSESSMENT
65 yo M PMH CKD stage 5, htn, dm present with worsen weakness, LE edema, poor appetite x several weeks, noted have worsen renal function. plan to initiate HD this admission    CKD stage 5 not on HD   now with worsen renal function, fluid overload and weakness, planning to initiate HD on this admission  plan for permacath today  HD after access  sent hep panel  fluid overloaded on lasix 80mg IV BID, fluid status improving.  monitor bmp, fluid status  consent for HD in chart. all questions answered    SW to set up out patient hd unit with [facility] dialysis    HTN  acceptable  diuresis   uf with hd  monitor    Anemia  iron def start iron 100 with hd  monitor hb    Ckd-mbd  optimal pth and phos level  monitor 65 yo M PMH CKD stage 5, htn, dm present with worsen weakness, LE edema, poor appetite x several weeks, noted have worsen renal function. plan to initiate HD this admission    CKD stage 5 not on HD   now with worsen renal function, fluid overload and weakness, planning to initiate HD on this admission  plan for permacath today  HD after access  sent hep panel  fluid overloaded on lasix 80mg IV BID, fluid status improving.  monitor bmp, fluid status  consent for HD in chart. all questions answered    SW to set up out patient hd unit with [facility]    HTN  acceptable  diuresis   uf with hd  monitor    Anemia  iron def start iron 100 with hd  monitor hb    Ckd-mbd  optimal pth and phos level  monitor

## 2020-03-26 NOTE — CHART NOTE - NSCHARTNOTEFT_GEN_A_CORE
Vascular & Interventional Radiology Post-Procedure Note    Pre-Procedure Diagnosis: New hemodialysis  Post-Procedure Diagnosis: Same as pre.  Indications for Procedure: Initiation of hemodialysis     : Elieser  Assistant(s): Alfonzo    Procedure Details/Findings: Patient right IJ  Access (if applicable): Right IJ    Complications: No immediate   Estimated Blood Loss: Minimal  Specimen: None  Contrast: None  Sedation: Provided by anesthesia department. 10cc of local lidocaine with epinephrine   Patient Condition/Disposition: To PACU for recovery then to floor.     Plan: Recovery in PACU.   Ok to start hemodialysis today.  Monitor site for bleeding.    Please call IR with any questions.

## 2020-03-26 NOTE — PROGRESS NOTE ADULT - PROBLEM SELECTOR PLAN 3
2/2 CKD  -f/u iron panel  -Plan for epo per renal  -monitor hb 2/2 CKD  -iron panel showing low serum iron  -Plan for epo per renal  -monitor hb

## 2020-03-26 NOTE — DISCHARGE NOTE PROVIDER - NSDCCPCAREPLAN_GEN_ALL_CORE_FT
PRINCIPAL DISCHARGE DIAGNOSIS  Diagnosis: Chronic kidney disease, unspecified CKD stage  Assessment and Plan of Treatment: You were admitted for worsening kidney function. Decision was made to begin dialysis, so a catheter was placed by our interventional radiologists to allow for that to happen. You underwent several dialysis sessions while admitted and tolerated them well. After being discharged, please attend your dialysis sessions, as planned. Please have close follow up with your PMD and nephrologists.

## 2020-03-26 NOTE — DISCHARGE NOTE PROVIDER - NSDCFUADDAPPT_GEN_ALL_CORE_FT
1. After being discharged, please have close follow up with your nephrologist.  2. Please follow up with your PMD, upon discharge. 1. After being discharged, please have close follow up with your nephrologist. Go to the Dialysis sessions that were set up for you.   2. Please follow up with your PMD, upon discharge. 1. After being discharged, please have close follow up with your nephrologist. Go to the Dialysis sessions that were set up for you.   2. Please follow up with your PMD, upon discharge.      [facility]   [address]  [place]   [address]  [phone]      Tuesday, Thursday, Saturday at 2:45 pm

## 2020-03-26 NOTE — PROGRESS NOTE ADULT - PROBLEM SELECTOR PLAN 1
2/2 ESRD, plan for HD tomorrow  -c/w Lasix 80mg IVP per renal  -IR consulted for permacath tomorrow 2/2 ESRD, plan for HD following Permacath placement  -c/w Lasix 80mg IVP per renal  -IR consulted for permacath today

## 2020-03-26 NOTE — DISCHARGE NOTE PROVIDER - PROVIDER TOKENS
PROVIDER:[TOKEN:[[id number]]],FREE:[LAST:[Semaj],FIRST:[Burak],PHONE:[phone]],FAX:[(   )    -],ADDRESS:[[address] [place] [address]]]

## 2020-03-26 NOTE — DISCHARGE NOTE PROVIDER - NSDCFUSCHEDAPPT_GEN_ALL_CORE_FT
HENRY VERAS ; 05/03/2020 ; [facility]  HENRY VERAS ; 05/04/2020 ; [facility] Amb Surg HENRY ADAN ; 06/02/2020 ; [facility] Surg [facility] [address] [facility] HENRY Hightower ; 06/02/2020 ; [facility] Nephro [address] [facility] HENRY Hightower ; 06/02/2020 ; [facility] Surg [facility] [address] [facility] HENRY Hightower ; 06/02/2020 ; [facility] Surg [facility] [address] [facility]  HENRY VERAS ; 05/03/2020 ; [facility]  HENRY VERAS ; 05/04/2020 ; [facility] Amb Surg HENRY ADAN ; 06/02/2020 ; [facility] Surg [facility] [address] [facility] HENRY Hightower ; 06/02/2020 ; [facility] Nephro [address] [facility] HENRY Hightower ; 06/02/2020 ; [facility] Surg [facility] [address] [facility] HENRY Hightower ; 06/02/2020 ; [facility] Surg [facility] [address] [facility]  HENRY VERAS ; 05/03/2020 ; [facility]  HENRY VERAS ; 05/04/2020 ; [facility] Amb Surg HENRY ADAN ; 06/02/2020 ; [facility] Surg [facility] [address] [facility] HENRY Hightower ; 06/02/2020 ; [facility] Nephro [address] [facility] HENRY Hightower ; 06/02/2020 ; [facility] Surg [facility] [address] [facility] HENRY Hightower ; 06/02/2020 ; [facility] Surg [facility]  HENRY VERAS ; 05/03/2020 ; [facility]  HENRY VERAS ; 05/04/2020 ; [facility] Amb Surg HENRY ADAN ; 06/02/2020 ; [facility]"" Surg [facility] [address] [facility] HENRY Hightower ; 06/02/2020 ; \[facility]"" Nephro [address] [facility] HENRY Hightower ; 06/02/2020 ; \[facility]"" Surg TrPl [address] [facility] HENRY Hightower ; 06/02/2020 ; \[facility]\"" Surg TrPl [facility]

## 2020-03-26 NOTE — PROGRESS NOTE ADULT - PROBLEM SELECTOR PLAN 4
SBP 150s  -C/w home meds, amlodipine 10mg, Hydralazine 100mg TID, Coreg 25mg BID, and Clonidine .1 BID w/holding parameters  -C/w IV Lasix 80mg BID  -Monitor BP and adjust meds as needed

## 2020-03-27 LAB
ANION GAP SERPL CALC-SCNC: 11 MMO/L — SIGNIFICANT CHANGE UP (ref 7–14)
BUN SERPL-MCNC: 28 MG/DL — HIGH (ref 7–23)
CALCIUM SERPL-MCNC: 7.8 MG/DL — LOW (ref 8.4–10.5)
CHLORIDE SERPL-SCNC: 103 MMOL/L — SIGNIFICANT CHANGE UP (ref 98–107)
CO2 SERPL-SCNC: 26 MMOL/L — SIGNIFICANT CHANGE UP (ref 22–31)
CREAT SERPL-MCNC: 4.72 MG/DL — HIGH (ref 0.5–1.3)
GLUCOSE SERPL-MCNC: 172 MG/DL — HIGH (ref 70–99)
HBV CORE IGM SER-ACNC: NONREACTIVE — SIGNIFICANT CHANGE UP
HCT VFR BLD CALC: 26.6 % — LOW (ref 39–50)
HCV AB S/CO SERPL IA: 0.2 S/CO — SIGNIFICANT CHANGE UP (ref 0–0.99)
HCV AB SERPL-IMP: SIGNIFICANT CHANGE UP
HGB BLD-MCNC: 8.3 G/DL — LOW (ref 13–17)
MCHC RBC-ENTMCNC: 28 PG — SIGNIFICANT CHANGE UP (ref 27–34)
MCHC RBC-ENTMCNC: 31.2 % — LOW (ref 32–36)
MCV RBC AUTO: 89.9 FL — SIGNIFICANT CHANGE UP (ref 80–100)
NRBC # FLD: 0 K/UL — SIGNIFICANT CHANGE UP (ref 0–0)
PLATELET # BLD AUTO: 145 K/UL — LOW (ref 150–400)
PMV BLD: 11.7 FL — SIGNIFICANT CHANGE UP (ref 7–13)
POTASSIUM SERPL-MCNC: 3.4 MMOL/L — LOW (ref 3.5–5.3)
POTASSIUM SERPL-SCNC: 3.4 MMOL/L — LOW (ref 3.5–5.3)
RBC # BLD: 2.96 M/UL — LOW (ref 4.2–5.8)
RBC # FLD: 14.4 % — SIGNIFICANT CHANGE UP (ref 10.3–14.5)
SODIUM SERPL-SCNC: 140 MMOL/L — SIGNIFICANT CHANGE UP (ref 135–145)
WBC # BLD: 4.27 K/UL — SIGNIFICANT CHANGE UP (ref 3.8–10.5)
WBC # FLD AUTO: 4.27 K/UL — SIGNIFICANT CHANGE UP (ref 3.8–10.5)

## 2020-03-27 PROCEDURE — 93306 TTE W/DOPPLER COMPLETE: CPT | Mod: 26

## 2020-03-27 RX ORDER — FUROSEMIDE 40 MG
80 TABLET ORAL DAILY
Refills: 0 | Status: DISCONTINUED | OUTPATIENT
Start: 2020-03-27 | End: 2020-03-31

## 2020-03-27 RX ADMIN — TAMSULOSIN HYDROCHLORIDE 0.4 MILLIGRAM(S): 0.4 CAPSULE ORAL at 02:00

## 2020-03-27 RX ADMIN — Medication 75 MICROGRAM(S): at 05:34

## 2020-03-27 RX ADMIN — Medication 2: at 18:43

## 2020-03-27 RX ADMIN — Medication 80 MILLIGRAM(S): at 15:43

## 2020-03-27 RX ADMIN — Medication 100 MILLIGRAM(S): at 17:29

## 2020-03-27 RX ADMIN — ATORVASTATIN CALCIUM 40 MILLIGRAM(S): 80 TABLET, FILM COATED ORAL at 02:11

## 2020-03-27 RX ADMIN — CARVEDILOL PHOSPHATE 25 MILLIGRAM(S): 80 CAPSULE, EXTENDED RELEASE ORAL at 17:29

## 2020-03-27 RX ADMIN — Medication 0.1 MILLIGRAM(S): at 22:16

## 2020-03-27 RX ADMIN — Medication 0.1 MILLIGRAM(S): at 02:03

## 2020-03-27 RX ADMIN — ATORVASTATIN CALCIUM 40 MILLIGRAM(S): 80 TABLET, FILM COATED ORAL at 22:17

## 2020-03-27 RX ADMIN — Medication 1: at 22:23

## 2020-03-27 RX ADMIN — Medication 100 MILLIGRAM(S): at 02:03

## 2020-03-27 RX ADMIN — CARVEDILOL PHOSPHATE 25 MILLIGRAM(S): 80 CAPSULE, EXTENDED RELEASE ORAL at 05:34

## 2020-03-27 RX ADMIN — TAMSULOSIN HYDROCHLORIDE 0.4 MILLIGRAM(S): 0.4 CAPSULE ORAL at 22:17

## 2020-03-27 RX ADMIN — Medication 80 MILLIGRAM(S): at 05:34

## 2020-03-27 RX ADMIN — AMLODIPINE BESYLATE 10 MILLIGRAM(S): 2.5 TABLET ORAL at 05:34

## 2020-03-27 NOTE — PROGRESS NOTE ADULT - PROBLEM SELECTOR PLAN 6
Holding HSQ in anticipation of permacath    Dispo: awaiting outpatient set up with HD center. Will likely have to stay the weekend.

## 2020-03-27 NOTE — PROGRESS NOTE ADULT - PROBLEM SELECTOR PLAN 1
2/2 ESRD, plan for HD following Permacath placement  -c/w Lasix 80mg IVP per renal  -IR consulted for permacath today 2/2 ESRD, Permacath placement 3/26  - per renal, will switch to PO Lasix

## 2020-03-27 NOTE — PROGRESS NOTE ADULT - PROBLEM SELECTOR PLAN 4
SBP 150s  -C/w home meds, amlodipine 10mg, Hydralazine 100mg TID, Coreg 25mg BID, and Clonidine .1 BID w/holding parameters  -per nephrology, switching Lasix to PO  -Monitor BP and adjust meds as needed

## 2020-03-27 NOTE — PROGRESS NOTE ADULT - SUBJECTIVE AND OBJECTIVE BOX
Chace Andrews MD  Interventional Cardiology / Advance Heart Failure and Cardiac Transplant Specialist  [place] Office : [address] [place] [address] [facility] [address]  Tel:   [place] Office : [address] [facility] [place] [address]  Tel: [phone]  Cell : [phone]    Pt is lying in bed comfortable not in distress, no chest pains no SOB no palpitations  	  MEDICATIONS:  amLODIPine   Tablet 10 milliGRAM(s) Oral daily  carvedilol 25 milliGRAM(s) Oral every 12 hours  cloNIDine 0.1 milliGRAM(s) Oral two times a day  furosemide    Tablet 80 milliGRAM(s) Oral daily  hydrALAZINE 100 milliGRAM(s) Oral three times a day  tamsulosin 0.4 milliGRAM(s) Oral at bedtime            atorvastatin 40 milliGRAM(s) Oral at bedtime  dextrose 40% Gel 15 Gram(s) Oral once PRN  dextrose 50% Injectable 12.5 Gram(s) IV Push once  dextrose 50% Injectable 25 Gram(s) IV Push once  dextrose 50% Injectable 25 Gram(s) IV Push once  glucagon  Injectable 1 milliGRAM(s) IntraMuscular once PRN  insulin lispro (HumaLOG) corrective regimen sliding scale   SubCutaneous Before meals and at bedtime  levothyroxine 75 MICROGram(s) Oral daily    dextrose 5%. 1000 milliLiter(s) IV Continuous <Continuous>  iron sucrose Injectable 100 milliGRAM(s) IV Push <User Schedule>      PAST MEDICAL/SURGICAL HISTORY  PAST MEDICAL & SURGICAL HISTORY:  Type II diabetes mellitus  Essential hypertension  Chronic kidney disease (CKD), stage V  S/P appendectomy      SOCIAL HISTORY: Substance Use (street drugs): ( x ) never used  (  ) other:    FAMILY HISTORY:    PHYSICAL EXAM:  T(C): 36.9 (03-27-20 @ 17:12), Max: 37.1 (03-27-20 @ 02:00)  HR: 90 (03-27-20 @ 17:12) (81 - 93)  BP: 142/79 (03-27-20 @ 17:12) (139/69 - 161/78)  RR: 17 (03-27-20 @ 17:12) (16 - 18)  SpO2: 100% (03-27-20 @ 17:12) (95% - 100%)  Wt(kg): --  I&O's Summary    26 Mar 2020 07:01  -  27 Mar 2020 07:00  --------------------------------------------------------  IN: 1050 mL / OUT: 2400 mL / NET: -1350 mL    27 Mar 2020 07:01  -  27 Mar 2020 21:38  --------------------------------------------------------  IN: 400 mL / OUT: 2100 mL / NET: -1700 mL          EYES: EOMI, PERRLA, conjunctiva and sclera clear  ENMT: No tonsillar erythema, exudates, or enlargement; Moist mucous membranes, Good dentition, No lesions  Cardiovascular: Normal S1 S2, No JVD, No murmurs, No edema  Respiratory: Lungs clear to auscultation	  Gastrointestinal:  Soft, Non-tender, + BS	  Extremities: 1+ edema                                    8.3    4.27  )-----------( 145      ( 27 Mar 2020 12:30 )             26.6     03-27    140  |  103  |  28<H>  ----------------------------<  172<H>  3.4<L>   |  26  |  4.72<H>    Ca    7.8<L>      27 Mar 2020 12:30  Phos  5.7     03-26  Mg     2.7     03-26      proBNP:   Lipid Profile:   HgA1c:   TSH:     Consultant(s) Notes Reviewed:  [x ] YES  [ ] NO    Care Discussed with Consultants/Other Providers [ x] YES  [ ] NO    Imaging Personally Reviewed independently:  [x] YES  [ ] NO    All labs, radiologic studies, vitals, orders and medications list reviewed. Patient is seen and examined at bedside. Case discussed with medical team.

## 2020-03-27 NOTE — PROGRESS NOTE ADULT - PROBLEM SELECTOR PLAN 3
2/2 CKD  -iron panel showing low serum iron  -Plan for epo per renal  -monitor hb 2/2 CKD  -iron panel showing low serum iron. C/w iron during HD  -Plan for epo per renal  -monitor hb

## 2020-03-27 NOTE — PROGRESS NOTE ADULT - PROBLEM SELECTOR PLAN 4
SBP 150s  -C/w home meds, amlodipine 10mg, Hydralazine 100mg TID, Coreg 25mg BID, and Clonidine .1 BID w/holding parameters  -C/w IV Lasix 80mg BID  -Monitor BP and adjust meds as needed SBP 150s  -C/w home meds, amlodipine 10mg, Hydralazine 100mg TID, Coreg 25mg BID, and Clonidine .1 BID w/holding parameters  -per nephrology, switching Lasix to PO  -Monitor BP and adjust meds as needed

## 2020-03-27 NOTE — PROGRESS NOTE ADULT - PROBLEM SELECTOR PLAN 3
2/2 CKD  -iron panel showing low serum iron. C/w iron during HD  -Plan for epo per renal  -monitor hb

## 2020-03-27 NOTE — PROGRESS NOTE ADULT - SUBJECTIVE AND OBJECTIVE BOX
Saravanan Viveros MD  Internal Medicine, PGY-1  Beeper: [phone] ([facility])/ [phone] ([facility])     Subjective:    Patient is a 64y old  Male who presents with a chief complaint of weakness, HD initiation (26 Mar 2020 14:44)    No acute overnight events.  Patient was seen and examined at bedside this AM. Denied fever, chills, nausea, vomiting, CP, palpitations, coughing, wheezing, SOB, and abdominal pain.      MEDICATIONS  (STANDING):  amLODIPine   Tablet 10 milliGRAM(s) Oral daily  atorvastatin 40 milliGRAM(s) Oral at bedtime  carvedilol 25 milliGRAM(s) Oral every 12 hours  cloNIDine 0.1 milliGRAM(s) Oral two times a day  dextrose 5%. 1000 milliLiter(s) (50 mL/Hr) IV Continuous <Continuous>  dextrose 50% Injectable 12.5 Gram(s) IV Push once  dextrose 50% Injectable 25 Gram(s) IV Push once  dextrose 50% Injectable 25 Gram(s) IV Push once  furosemide   Injectable 80 milliGRAM(s) IV Push two times a day  hydrALAZINE 100 milliGRAM(s) Oral three times a day  insulin lispro (HumaLOG) corrective regimen sliding scale   SubCutaneous Before meals and at bedtime  iron sucrose Injectable 100 milliGRAM(s) IV Push <User Schedule>  levothyroxine 75 MICROGram(s) Oral daily  tamsulosin 0.4 milliGRAM(s) Oral at bedtime    MEDICATIONS  (PRN):  dextrose 40% Gel 15 Gram(s) Oral once PRN Blood Glucose LESS THAN 70 milliGRAM(s)/deciliter  glucagon  Injectable 1 milliGRAM(s) IntraMuscular once PRN Glucose LESS THAN 70 milligrams/deciliter      Objective:    Vitals: Vital Signs Last 24 Hrs  T(C): 37 (03-27-20 @ 05:27), Max: 37.3 (03-26-20 @ 09:41)  T(F): 98.6 (03-27-20 @ 05:27), Max: 99.1 (03-26-20 @ 09:41)  HR: 86 (03-27-20 @ 05:27) (79 - 93)  BP: 139/69 (03-27-20 @ 05:27) (139/69 - 160/90)  BP(mean): 93 (03-26-20 @ 09:41) (93 - 93)  RR: 18 (03-27-20 @ 05:27) (17 - 19)  SpO2: 95% (03-27-20 @ 05:27) (92% - 98%)              I&O's Summary    26 Mar 2020 07:01  -  27 Mar 2020 07:00  --------------------------------------------------------  IN: 1050 mL / OUT: 2400 mL / NET: -1350 mL          PHYSICAL EXAM:  GENERAL: NAD, well-groomed, well-developed  HEAD:  Atraumatic, Normocephalic  EYES: PERRLA, conjunctiva and sclera clear  ENMT: No tonsillar erythema, exudates, or enlargement; Moist mucous membranes, Good dentition, No lesions  NECK: Supple, No JVD, Normal thyroid  CHEST/LUNG: Clear to auscultation bilaterally; No rales, rhonchi, wheezing, or rubs  HEART: Regular rate and rhythm; No murmurs, rubs, or gallops  ABDOMEN: Soft, Nontender, Nondistended; Bowel sounds present  EXTREMITIES:  2+ Peripheral Pulses, No clubbing, cyanosis, or edema  LYMPH: No lymphadenopathy noted  SKIN: No rashes or lesions noted  NERVOUS SYSTEM:  Alert & Oriented X3, Good concentration; Motor Strength 5/5 B/L upper and lower extremities; DTRs 2+ intact and symmetric                                               LABS:  03-26    140  |  104  |  39<H>  ----------------------------<  109<H>  3.7   |  23  |  5.49<H>  03-25    140  |  104  |  38<H>  ----------------------------<  223<H>  4.5   |  24  |  5.42<H>    Ca    8.1<L>      26 Mar 2020 05:50  Ca    9.0      25 Mar 2020 13:10  Phos  5.7     03-26  Mg     2.7     03-26    TPro  6.1  /  Alb  3.0<L>  /  TBili  0.2  /  DBili  x   /  AST  16  /  ALT  18  /  AlkPhos  103  03-25      PT/INR - ( 26 Mar 2020 05:50 )   PT: 11.2 SEC;   INR: 0.97                                                        7.6    3.64  )-----------( 148      ( 26 Mar 2020 05:50 )             24.4                         9.1    4.30  )-----------( 169      ( 25 Mar 2020 13:10 )             28.5     CAPILLARY BLOOD GLUCOSE      POCT Blood Glucose.: 227 mg/dL (26 Mar 2020 21:53)  POCT Blood Glucose.: 93 mg/dL (26 Mar 2020 12:56)        RECENT CULTURES:      TELEMETRY:    ECG:    TTE:    RADIOLOGY & ADDITIONAL TESTS:    Imaging Personally Reviewed:  [ ] YES  [ ] NO    Consultants involved in case:   Consultant(s) Notes Reviewed:  [ ] YES  [ ] NO:   Care Discussed with Consultants/Other Providers [ ] YES  [ ] NO Saravanan Viveros MD  Internal Medicine, PGY-1  Beeper: [phone] ([facility])/ [phone] ([facility])     Subjective:    Patient is a 64y old  Male who presents with a chief complaint of weakness, HD initiation (26 Mar 2020 14:44)    No acute overnight events. Under went Permacath placement last night. HD session afterwards.    Patient was seen and examined at bedside this AM. No complaints. Denied fever, chills, nausea, vomiting, CP, palpitations, coughing, wheezing, SOB, and abdominal pain.      MEDICATIONS  (STANDING):  amLODIPine   Tablet 10 milliGRAM(s) Oral daily  atorvastatin 40 milliGRAM(s) Oral at bedtime  carvedilol 25 milliGRAM(s) Oral every 12 hours  cloNIDine 0.1 milliGRAM(s) Oral two times a day  dextrose 5%. 1000 milliLiter(s) (50 mL/Hr) IV Continuous <Continuous>  dextrose 50% Injectable 12.5 Gram(s) IV Push once  dextrose 50% Injectable 25 Gram(s) IV Push once  dextrose 50% Injectable 25 Gram(s) IV Push once  furosemide   Injectable 80 milliGRAM(s) IV Push two times a day  hydrALAZINE 100 milliGRAM(s) Oral three times a day  insulin lispro (HumaLOG) corrective regimen sliding scale   SubCutaneous Before meals and at bedtime  iron sucrose Injectable 100 milliGRAM(s) IV Push <User Schedule>  levothyroxine 75 MICROGram(s) Oral daily  tamsulosin 0.4 milliGRAM(s) Oral at bedtime    MEDICATIONS  (PRN):  dextrose 40% Gel 15 Gram(s) Oral once PRN Blood Glucose LESS THAN 70 milliGRAM(s)/deciliter  glucagon  Injectable 1 milliGRAM(s) IntraMuscular once PRN Glucose LESS THAN 70 milligrams/deciliter      Objective:    Vitals: Vital Signs Last 24 Hrs  T(C): 37 (03-27-20 @ 05:27), Max: 37.3 (03-26-20 @ 09:41)  T(F): 98.6 (03-27-20 @ 05:27), Max: 99.1 (03-26-20 @ 09:41)  HR: 86 (03-27-20 @ 05:27) (79 - 93)  BP: 139/69 (03-27-20 @ 05:27) (139/69 - 160/90)  BP(mean): 93 (03-26-20 @ 09:41) (93 - 93)  RR: 18 (03-27-20 @ 05:27) (17 - 19)  SpO2: 95% (03-27-20 @ 05:27) (92% - 98%)                I&O's Summary  26 Mar 2020 07:01  -  27 Mar 2020 07:00  --------------------------------------------------------  IN: 1050 mL / OUT: 2400 mL / NET: -1350 mL        PHYSICAL EXAM:  GENERAL: Elderly male sleeping comfortably in bed in NAD  EYES: conjunctiva and sclera clear  CHEST/LUNG: Clear to auscultation bilaterally; No rales, rhonchi, wheezing, or rubs  HEART: Regular rate and rhythm; No murmurs, rubs, or gallops  ABDOMEN: Soft, Nontender, Nondistended; Bowel sounds present  EXTREMITIES: b/l LE edema  NERVOUS SYSTEM: awake and alert, answering questions appropriately                               LABS:  CAPILLARY BLOOD GLUCOSE  POCT Blood Glucose.: 103 mg/dL (27 Mar 2020 07:51)  POCT Blood Glucose.: 227 mg/dL (26 Mar 2020 21:53)  POCT Blood Glucose.: 93 mg/dL (26 Mar 2020 12:56)

## 2020-03-27 NOTE — PROGRESS NOTE ADULT - ASSESSMENT
EKG - NSR     A/P     1) LE edema - likley sec to worsening renal function ,  2d echo shows normal LV cont lasix 80mg daily, s/p  permacath     2) HTN - cont clonidine coreg, amlodipine and hydralzaine    3) CKD - s/p  permacath getting HD f/u renal

## 2020-03-27 NOTE — PROGRESS NOTE ADULT - ASSESSMENT
65 yo M PMH CKD stage 5, htn, dm present with worsen weakness, LE edema, poor appetite x several weeks, noted have worsen renal function. plan to initiate HD this admission    CKD stage 5 not on HD   now with worsen renal function, fluid overload and weakness, planning to initiate HD on this admission  s/p permacath and 1st HD 3/26 tolerated well  2nd treatment today, continue as ordered. vitals stable access working well  fluid overloaded on lasix 80mg IV BID, fluid status improving, would change to lasix 80mg po daily  monitor bmp, fluid status  consent for HD in chart. all questions answered    SW to set up out patient hd unit with [facility] and [place] [facility]  pt stable for discharge once outpatient HD unit is set up    HTN  suboptimal  uf with hd  monitor post HD    Anemia  iron def start iron 100 with hd  monitor hb    Ckd-mbd  optimal pth and phos level  monitor 63 yo M PMH CKD stage 5, htn, dm present with worsen weakness, LE edema, poor appetite x several weeks, noted have worsen renal function. plan to initiate HD this admission    CKD stage 5 not on HD   now with worsen renal function, fluid overload and weakness, planning to initiate HD on this admission  s/p permacath and 1st HD 3/26 tolerated well  2nd treatment today, continue as ordered. vitals stable access working well  fluid overloaded on lasix 80mg IV BID, fluid status improving, would change to lasix 80mg po daily  monitor bmp, fluid status  consent for HD in chart. all questions answered    SW to set up out patient hd unit with [facility] dialysis OR [place]  pt stable for discharge once outpatient HD unit is set up. Discussed with SW on 3/26/2020    HTN  suboptimal  uf with hd  monitor post HD    Anemia  iron def start iron 100 with hd  monitor hb    Ckd-mbd  optimal pth and phos level  monitor

## 2020-03-27 NOTE — PROGRESS NOTE ADULT - PROBLEM SELECTOR PLAN 6
Holding HSQ in anticipation of permacath Holding HSQ in anticipation of permacath    Dispo: awaiting outpatient set up with HD center. Will likely have to stay the weekend.

## 2020-03-27 NOTE — PROGRESS NOTE ADULT - SUBJECTIVE AND OBJECTIVE BOX
[facility]   MD MARY LOU ABDUL DO ANAM SIDDIQUI ANGELA WONG, PA    TEL:  OFFICE: [phone]  DR CHIN CELL: [phone]  DR. YOUNG CELL: [phone]  DR. ARZATE CELL: [phone]  HEIDI LINARES CELL: [phone]        Patient is a 64y old  Male who presents with a chief complaint of weakness, HD initiation (27 Mar 2020 07:15)      Patient seen and examined in HD. No chest pain/sob    VITALS:  T(F): 98.6 (03-27-20 @ 11:00), Max: 99 (03-26-20 @ 19:30)  HR: 85 (03-27-20 @ 11:00)  BP: 157/60 (03-27-20 @ 11:00)  RR: 16 (03-27-20 @ 11:00)  SpO2: 97% (03-27-20 @ 10:42)  Wt(kg): --  flow 250    03-26 @ 07:01  -  03-27 @ 07:00  --------------------------------------------------------  IN: 1050 mL / OUT: 2400 mL / NET: -1350 mL    03-27 @ 07:01  -  03-27 @ 12:37  --------------------------------------------------------  IN: 0 mL / OUT: 700 mL / NET: -700 mL          PHYSICAL EXAM:  Constitutional: NAD  Neck: No JVD  Respiratory: CTAB, no wheezes, rales or rhonchi  Cardiovascular: S1, S2, RRR  Gastrointestinal: BS+, soft, NT/ND  Extremities: No peripheral edema    Hospital Medications:   MEDICATIONS  (STANDING):  amLODIPine   Tablet 10 milliGRAM(s) Oral daily  atorvastatin 40 milliGRAM(s) Oral at bedtime  carvedilol 25 milliGRAM(s) Oral every 12 hours  cloNIDine 0.1 milliGRAM(s) Oral two times a day  dextrose 5%. 1000 milliLiter(s) (50 mL/Hr) IV Continuous <Continuous>  dextrose 50% Injectable 12.5 Gram(s) IV Push once  dextrose 50% Injectable 25 Gram(s) IV Push once  dextrose 50% Injectable 25 Gram(s) IV Push once  furosemide   Injectable 80 milliGRAM(s) IV Push two times a day  hydrALAZINE 100 milliGRAM(s) Oral three times a day  insulin lispro (HumaLOG) corrective regimen sliding scale   SubCutaneous Before meals and at bedtime  iron sucrose Injectable 100 milliGRAM(s) IV Push <User Schedule>  levothyroxine 75 MICROGram(s) Oral daily  tamsulosin 0.4 milliGRAM(s) Oral at bedtime      LABS:  03-26    140  |  104  |  39<H>  ----------------------------<  109<H>  3.7   |  23  |  5.49<H>    Ca    8.1<L>      26 Mar 2020 05:50  Phos  5.7     03-26  Mg     2.7     03-26    TPro  6.1  /  Alb  3.0<L>  /  TBili  0.2  /  DBili      /  AST  16  /  ALT  18  /  AlkPhos  103  03-25    Creatinine Trend: 5.49 <--, 5.42 <--                                7.6    3.64  )-----------( 148      ( 26 Mar 2020 05:50 )             24.4     Urine Studies:      Iron 26, TIBC 170, %sat --      [03-26-20 @ 05:50]  Ferritin 158.8      [03-26-20 @ 05:50]  PTH 32.53 (Ca --)      [03-26-20 @ 05:50]   --    HBsAg NEGATIVE      [03-26-20 @ 17:20]  HCV 0.20, Nonreactive Hepatitis C AB  S/CO Ratio                        Interpretation  < 1.00                                   Non-Reactive  1.00 - 4.99                         Weakly-Reactive  >= 5.00                                Reactive  Non-Reactive: [place]erson with a non-reactive HCV antibody  result is considered uninfected.  No further action is  needed unless recent infection is suspected.  In these  cases, consider repeat testing later to detect  seroconversion..  Weakly-Reactive: HCV antibody test is abnormal, HCV RNA  Qualitative test will follow.  Reactive: HCV antibody test is abnormal, HCV RNA  Qualitative test will follow.  Note: HCV antibody testing is performed on the [employer]   system.      [03-26-20 @ 17:20]      RADIOLOGY & ADDITIONAL STUDIES:

## 2020-03-27 NOTE — PROGRESS NOTE ADULT - SUBJECTIVE AND OBJECTIVE BOX
Patient is a 64y old  Male who presents with a chief complaint of weakness, HD initiation (27 Mar 2020 12:37)      INTERVAL HPI/OVERNIGHT EVENTS:  T(C): 37.3 (03-27-20 @ 22:14), Max: 37.3 (03-27-20 @ 22:14)  HR: 88 (03-27-20 @ 22:14) (81 - 93)  BP: 138/65 (03-27-20 @ 22:14) (138/65 - 161/78)  RR: 18 (03-27-20 @ 22:14) (16 - 18)  SpO2: 100% (03-27-20 @ 22:14) (95% - 100%)  Wt(kg): --  I&O's Summary    26 Mar 2020 07:01  -  27 Mar 2020 07:00  --------------------------------------------------------  IN: 1050 mL / OUT: 2400 mL / NET: -1350 mL    27 Mar 2020 07:01  -  27 Mar 2020 23:58  --------------------------------------------------------  IN: 400 mL / OUT: 2100 mL / NET: -1700 mL        PAST MEDICAL & SURGICAL HISTORY:  Type II diabetes mellitus  Essential hypertension  Chronic kidney disease (CKD), stage V  S/P appendectomy      SOCIAL HISTORY  Alcohol:  Tobacco:  Illicit substance use:    FAMILY HISTORY:    REVIEW OF SYSTEMS:  CONSTITUTIONAL: No fever, weight loss, or fatigue  EYES: No eye pain, visual disturbances, or discharge  ENMT:  No difficulty hearing, tinnitus, vertigo; No sinus or throat pain  NECK: No pain or stiffness  RESPIRATORY: No cough, wheezing, chills or hemoptysis; No shortness of breath  CARDIOVASCULAR: No chest pain, palpitations, dizziness, or leg swelling  GASTROINTESTINAL: No abdominal or epigastric pain. No nausea, vomiting, or hematemesis; No diarrhea or constipation. No melena or hematochezia.  GENITOURINARY: No dysuria, frequency, hematuria, or incontinence  NEUROLOGICAL: No headaches, memory loss, loss of strength, numbness, or tremors  SKIN: No itching, burning, rashes, or lesions   LYMPH NODES: No enlarged glands  ENDOCRINE: No heat or cold intolerance; No hair loss  MUSCULOSKELETAL: No joint pain or swelling; No muscle, back, or extremity pain  PSYCHIATRIC: No depression, anxiety, mood swings, or difficulty sleeping  HEME/LYMPH: No easy bruising, or bleeding gums  ALLERY AND IMMUNOLOGIC: No hives or eczema    RADIOLOGY & ADDITIONAL TESTS:    Imaging Personally Reviewed:  [ ] YES  [ ] NO    Consultant(s) Notes Reviewed:  [ ] YES  [ ] NO    PHYSICAL EXAM:  GENERAL: NAD, well-groomed, well-developed  HEAD:  Atraumatic, Normocephalic  EYES: EOMI, PERRLA, conjunctiva and sclera clear  ENMT: No tonsillar erythema, exudates, or enlargement; Moist mucous membranes, Good dentition, No lesions  NECK: Supple, No JVD, Normal thyroid  NERVOUS SYSTEM:  Alert & Oriented X3, Good concentration; Motor Strength 5/5 B/L upper and lower extremities; DTRs 2+ intact and symmetric  CHEST/LUNG: Clear to percussion bilaterally; No rales, rhonchi, wheezing, or rubs  HEART: Regular rate and rhythm; No murmurs, rubs, or gallops  ABDOMEN: Soft, Nontender, Nondistended; Bowel sounds present  EXTREMITIES:  2+ Peripheral Pulses, No clubbing, cyanosis, or edema  LYMPH: No lymphadenopathy noted  SKIN: No rashes or lesions    LABS:                        8.3    4.27  )-----------( 145      ( 27 Mar 2020 12:30 )             26.6     03-27    140  |  103  |  28<H>  ----------------------------<  172<H>  3.4<L>   |  26  |  4.72<H>    Ca    7.8<L>      27 Mar 2020 12:30  Phos  5.7     03-26  Mg     2.7     03-26      PT/INR - ( 26 Mar 2020 05:50 )   PT: 11.2 SEC;   INR: 0.97              CAPILLARY BLOOD GLUCOSE      POCT Blood Glucose.: 174 mg/dL (27 Mar 2020 22:20)  POCT Blood Glucose.: 210 mg/dL (27 Mar 2020 18:34)  POCT Blood Glucose.: 240 mg/dL (27 Mar 2020 17:07)  POCT Blood Glucose.: 133 mg/dL (27 Mar 2020 12:54)  POCT Blood Glucose.: 103 mg/dL (27 Mar 2020 07:51)            MEDICATIONS  (STANDING):  amLODIPine   Tablet 10 milliGRAM(s) Oral daily  atorvastatin 40 milliGRAM(s) Oral at bedtime  carvedilol 25 milliGRAM(s) Oral every 12 hours  cloNIDine 0.1 milliGRAM(s) Oral two times a day  dextrose 5%. 1000 milliLiter(s) (50 mL/Hr) IV Continuous <Continuous>  dextrose 50% Injectable 12.5 Gram(s) IV Push once  dextrose 50% Injectable 25 Gram(s) IV Push once  dextrose 50% Injectable 25 Gram(s) IV Push once  furosemide    Tablet 80 milliGRAM(s) Oral daily  hydrALAZINE 100 milliGRAM(s) Oral three times a day  insulin lispro (HumaLOG) corrective regimen sliding scale   SubCutaneous Before meals and at bedtime  iron sucrose Injectable 100 milliGRAM(s) IV Push <User Schedule>  levothyroxine 75 MICROGram(s) Oral daily  tamsulosin 0.4 milliGRAM(s) Oral at bedtime    MEDICATIONS  (PRN):  dextrose 40% Gel 15 Gram(s) Oral once PRN Blood Glucose LESS THAN 70 milliGRAM(s)/deciliter  glucagon  Injectable 1 milliGRAM(s) IntraMuscular once PRN Glucose LESS THAN 70 milligrams/deciliter      Care Discussed with Consultants/Other Providers [ ] YES  [ ] NO Patient is a 64y old  Male who presents with a chief complaint of weakness, HD initiation (27 Mar 2020 12:37)    pt. seen and examined    INTERVAL HPI/OVERNIGHT EVENTS:  T(C): 37.3 (03-27-20 @ 22:14), Max: 37.3 (03-27-20 @ 22:14)  HR: 88 (03-27-20 @ 22:14) (81 - 93)  BP: 138/65 (03-27-20 @ 22:14) (138/65 - 161/78)  RR: 18 (03-27-20 @ 22:14) (16 - 18)  SpO2: 100% (03-27-20 @ 22:14) (95% - 100%)  Wt(kg): --  I&O's Summary    26 Mar 2020 07:01  -  27 Mar 2020 07:00  --------------------------------------------------------  IN: 1050 mL / OUT: 2400 mL / NET: -1350 mL    27 Mar 2020 07:01  -  27 Mar 2020 23:58  --------------------------------------------------------  IN: 400 mL / OUT: 2100 mL / NET: -1700 mL        PAST MEDICAL & SURGICAL HISTORY:  Type II diabetes mellitus  Essential hypertension  Chronic kidney disease (CKD), stage V  S/P appendectomy      SOCIAL HISTORY  Alcohol:  Tobacco:  Illicit substance use:    FAMILY HISTORY:    REVIEW OF SYSTEMS:  CONSTITUTIONAL: No fever, weight loss, or fatigue  EYES: No eye pain, visual disturbances, or discharge  ENMT:  No difficulty hearing, tinnitus, vertigo; No sinus or throat pain  NECK: No pain or stiffness  RESPIRATORY: No cough, wheezing, chills or hemoptysis; No shortness of breath  CARDIOVASCULAR: No chest pain, palpitations, dizziness, or leg swelling  GASTROINTESTINAL: No abdominal or epigastric pain. No nausea, vomiting, or hematemesis; No diarrhea or constipation. No melena or hematochezia.  GENITOURINARY: No dysuria, frequency, hematuria, or incontinence  NEUROLOGICAL: No headaches, memory loss, loss of strength, numbness, or tremors  SKIN: No itching, burning, rashes, or lesions   LYMPH NODES: No enlarged glands  ENDOCRINE: No heat or cold intolerance; No hair loss  MUSCULOSKELETAL: No joint pain or swelling; No muscle, back, or extremity pain  PSYCHIATRIC: No depression, anxiety, mood swings, or difficulty sleeping  HEME/LYMPH: No easy bruising, or bleeding gums  ALLERY AND IMMUNOLOGIC: No hives or eczema    RADIOLOGY & ADDITIONAL TESTS:    Imaging Personally Reviewed:  [ ] YES  [ ] NO    Consultant(s) Notes Reviewed:  [ ] YES  [ ] NO    PHYSICAL EXAM:  GENERAL: NAD, well-groomed, well-developed  HEAD:  Atraumatic, Normocephalic  EYES: EOMI, PERRLA, conjunctiva and sclera clear  ENMT: No tonsillar erythema, exudates, or enlargement; Moist mucous membranes, Good dentition, No lesions  NECK: Supple, No JVD, Normal thyroid  NERVOUS SYSTEM:  Alert & Oriented X3, Good concentration; Motor Strength 5/5 B/L upper and lower extremities; DTRs 2+ intact and symmetric  CHEST/LUNG: Clear to percussion bilaterally; No rales, rhonchi, wheezing, or rubs  HEART: Regular rate and rhythm; No murmurs, rubs, or gallops  ABDOMEN: Soft, Nontender, Nondistended; Bowel sounds present  EXTREMITIES:  2+ Peripheral Pulses, No clubbing, cyanosis, or edema  LYMPH: No lymphadenopathy noted  SKIN: No rashes or lesions    LABS:                        8.3    4.27  )-----------( 145      ( 27 Mar 2020 12:30 )             26.6     03-27    140  |  103  |  28<H>  ----------------------------<  172<H>  3.4<L>   |  26  |  4.72<H>    Ca    7.8<L>      27 Mar 2020 12:30  Phos  5.7     03-26  Mg     2.7     03-26      PT/INR - ( 26 Mar 2020 05:50 )   PT: 11.2 SEC;   INR: 0.97              CAPILLARY BLOOD GLUCOSE      POCT Blood Glucose.: 174 mg/dL (27 Mar 2020 22:20)  POCT Blood Glucose.: 210 mg/dL (27 Mar 2020 18:34)  POCT Blood Glucose.: 240 mg/dL (27 Mar 2020 17:07)  POCT Blood Glucose.: 133 mg/dL (27 Mar 2020 12:54)  POCT Blood Glucose.: 103 mg/dL (27 Mar 2020 07:51)            MEDICATIONS  (STANDING):  amLODIPine   Tablet 10 milliGRAM(s) Oral daily  atorvastatin 40 milliGRAM(s) Oral at bedtime  carvedilol 25 milliGRAM(s) Oral every 12 hours  cloNIDine 0.1 milliGRAM(s) Oral two times a day  dextrose 5%. 1000 milliLiter(s) (50 mL/Hr) IV Continuous <Continuous>  dextrose 50% Injectable 12.5 Gram(s) IV Push once  dextrose 50% Injectable 25 Gram(s) IV Push once  dextrose 50% Injectable 25 Gram(s) IV Push once  furosemide    Tablet 80 milliGRAM(s) Oral daily  hydrALAZINE 100 milliGRAM(s) Oral three times a day  insulin lispro (HumaLOG) corrective regimen sliding scale   SubCutaneous Before meals and at bedtime  iron sucrose Injectable 100 milliGRAM(s) IV Push <User Schedule>  levothyroxine 75 MICROGram(s) Oral daily  tamsulosin 0.4 milliGRAM(s) Oral at bedtime    MEDICATIONS  (PRN):  dextrose 40% Gel 15 Gram(s) Oral once PRN Blood Glucose LESS THAN 70 milliGRAM(s)/deciliter  glucagon  Injectable 1 milliGRAM(s) IntraMuscular once PRN Glucose LESS THAN 70 milligrams/deciliter      Care Discussed with Consultants/Other Providers [ ] YES  [ ] NO

## 2020-03-28 PROCEDURE — 71045 X-RAY EXAM CHEST 1 VIEW: CPT | Mod: 26

## 2020-03-28 RX ADMIN — Medication 1: at 22:32

## 2020-03-28 RX ADMIN — ATORVASTATIN CALCIUM 40 MILLIGRAM(S): 80 TABLET, FILM COATED ORAL at 22:33

## 2020-03-28 RX ADMIN — Medication 0.1 MILLIGRAM(S): at 19:14

## 2020-03-28 RX ADMIN — CARVEDILOL PHOSPHATE 25 MILLIGRAM(S): 80 CAPSULE, EXTENDED RELEASE ORAL at 05:07

## 2020-03-28 RX ADMIN — Medication 80 MILLIGRAM(S): at 05:06

## 2020-03-28 RX ADMIN — Medication 100 MILLIGRAM(S): at 02:11

## 2020-03-28 RX ADMIN — Medication 100 MILLIGRAM(S): at 17:46

## 2020-03-28 RX ADMIN — Medication 2: at 17:43

## 2020-03-28 RX ADMIN — AMLODIPINE BESYLATE 10 MILLIGRAM(S): 2.5 TABLET ORAL at 05:07

## 2020-03-28 RX ADMIN — Medication 75 MICROGRAM(S): at 05:06

## 2020-03-28 RX ADMIN — TAMSULOSIN HYDROCHLORIDE 0.4 MILLIGRAM(S): 0.4 CAPSULE ORAL at 22:33

## 2020-03-28 RX ADMIN — CARVEDILOL PHOSPHATE 25 MILLIGRAM(S): 80 CAPSULE, EXTENDED RELEASE ORAL at 17:46

## 2020-03-28 NOTE — PROGRESS NOTE ADULT - SUBJECTIVE AND OBJECTIVE BOX
[facility]   MD Dwight Warner MD, D.O. Ruoru Wong, PA    From 7 AM - 5 PM:  OFFICE: [phone]  Dr. Cummins cell: [phone]  Dr. Womack cell: [phone]  Dr. Downs cell: [phone]  ELTON Carter cell: [phone]    From 5 PM - 7 AM: Answering Service: [phone]      RENAL FOLLOW UP NOTE  --------------------------------------------------------------------------------  HPI: Pt seen and examined at HD unit. Appetite improved. Pt feels well.   Denies SOB, chest pain     PAST HISTORY  --------------------------------------------------------------------------------  No significant changes to PMH, PSH, FHx, SHx, unless otherwise noted    ALLERGIES & MEDICATIONS  --------------------------------------------------------------------------------  Allergies    No Known Allergies    Intolerances      Standing Inpatient Medications  amLODIPine   Tablet 10 milliGRAM(s) Oral daily  atorvastatin 40 milliGRAM(s) Oral at bedtime  carvedilol 25 milliGRAM(s) Oral every 12 hours  cloNIDine 0.1 milliGRAM(s) Oral two times a day  dextrose 5%. 1000 milliLiter(s) IV Continuous <Continuous>  dextrose 50% Injectable 12.5 Gram(s) IV Push once  dextrose 50% Injectable 25 Gram(s) IV Push once  dextrose 50% Injectable 25 Gram(s) IV Push once  furosemide    Tablet 80 milliGRAM(s) Oral daily  hydrALAZINE 100 milliGRAM(s) Oral three times a day  insulin lispro (HumaLOG) corrective regimen sliding scale   SubCutaneous Before meals and at bedtime  iron sucrose Injectable 100 milliGRAM(s) IV Push <User Schedule>  levothyroxine 75 MICROGram(s) Oral daily  tamsulosin 0.4 milliGRAM(s) Oral at bedtime    PRN Inpatient Medications  dextrose 40% Gel 15 Gram(s) Oral once PRN  glucagon  Injectable 1 milliGRAM(s) IntraMuscular once PRN      REVIEW OF SYSTEMS  --------------------------------------------------------------------------------  General: no fever  CVS: no chest pain  RESP: no sob, no cough  ABD: no abdominal pain  : no dysuria  MSK: no edema     VITALS/PHYSICAL EXAM  --------------------------------------------------------------------------------  T(C): 36.9 (03-28-20 @ 12:16), Max: 37.3 (03-27-20 @ 22:14)  HR: 86 (03-28-20 @ 12:16) (61 - 90)  BP: 143/76 (03-28-20 @ 12:16) (138/65 - 156/77)  RR: 17 (03-28-20 @ 12:16) (17 - 18)  SpO2: 94% (03-28-20 @ 05:04) (94% - 100%)  Wt(kg): --    03-27-20 @ 07:01  -  03-28-20 @ 07:00  --------------------------------------------------------  IN: 700 mL / OUT: 2600 mL / NET: -1900 mL      Physical Exam:  	Gen: NAD  	HEENT: MMM  	Pulm: CTA B/L  	CV: S1S2  	Abd: Soft, +BS  	Ext: No LE edema B/L                      Neuro: Awake   	Skin: Warm and Dry   	Vascular access: + tunneled HD catheter     LABS/STUDIES  --------------------------------------------------------------------------------              8.3    4.27  >-----------<  145      [03-27-20 @ 12:30]              26.6     140  |  103  |  28  ----------------------------<  172      [03-27-20 @ 12:30]  3.4   |  26  |  4.72        Ca     7.8     [03-27-20 @ 12:30]    Creatinine Trend:  SCr 4.72 [03-27 @ 12:30]  SCr 5.49 [03-26 @ 05:50]  SCr 5.42 [03-25 @ 13:10]    Iron 26, TIBC 170, %sat --      [03-26-20 @ 05:50]  Ferritin 158.8      [03-26-20 @ 05:50]  PTH 32.53 (Ca --)      [03-26-20 @ 05:50]   --    HBsAg NEGATIVE      [03-26-20 @ 17:20]  HCV 0.20, Nonreactive Hepatitis C AB  S/CO Ratio                        Interpretation  < 1.00                                   Non-Reactive  1.00 - 4.99                         Weakly-Reactive  >= 5.00                                Reactive  Non-Reactive: Aperson with a non-reactive HCV antibody  result is considered uninfected.  No further action is  needed unless recent infection is suspected.  In these  cases, consider repeat testing later to detect  seroconversion..  Weakly-Reactive: HCV antibody test is abnormal, HCV RNA  Qualitative test will follow.  Reactive: HCV antibody test is abnormal, HCV RNA  Qualitative test will follow.  Note: HCV antibody testing is performed on the [employer]   system.      [03-26-20 @ 17:20]

## 2020-03-28 NOTE — PROGRESS NOTE ADULT - ATTENDING COMMENTS
pt. seen and examined, agree with above resident note , assessment and plan     awaiting out pt. HD spt then d/c planning
d/c planning once set up with out pt. HD unit
pt. seen and examined, s/p permacath and HD   agree with above resident note   d/w resident

## 2020-03-28 NOTE — PROGRESS NOTE ADULT - ASSESSMENT
64M w/CKD V, HTN, DMII p/w worsening LE edema and generalized weakness, admitted for HD initiation s/p permacath pending outpatient HD facility.

## 2020-03-28 NOTE — PROGRESS NOTE ADULT - SUBJECTIVE AND OBJECTIVE BOX
Patient is a 64y old  Male who presents with a chief complaint of weakness, HD initiation (27 Mar 2020 23:58)      SUBJECTIVE / OVERNIGHT EVENTS: No events overnight. No acute complaints.    MEDICATIONS  (STANDING):  amLODIPine   Tablet 10 milliGRAM(s) Oral daily  atorvastatin 40 milliGRAM(s) Oral at bedtime  carvedilol 25 milliGRAM(s) Oral every 12 hours  cloNIDine 0.1 milliGRAM(s) Oral two times a day  dextrose 5%. 1000 milliLiter(s) (50 mL/Hr) IV Continuous <Continuous>  dextrose 50% Injectable 12.5 Gram(s) IV Push once  dextrose 50% Injectable 25 Gram(s) IV Push once  dextrose 50% Injectable 25 Gram(s) IV Push once  furosemide    Tablet 80 milliGRAM(s) Oral daily  hydrALAZINE 100 milliGRAM(s) Oral three times a day  insulin lispro (HumaLOG) corrective regimen sliding scale   SubCutaneous Before meals and at bedtime  iron sucrose Injectable 100 milliGRAM(s) IV Push <User Schedule>  levothyroxine 75 MICROGram(s) Oral daily  tamsulosin 0.4 milliGRAM(s) Oral at bedtime    MEDICATIONS  (PRN):  dextrose 40% Gel 15 Gram(s) Oral once PRN Blood Glucose LESS THAN 70 milliGRAM(s)/deciliter  glucagon  Injectable 1 milliGRAM(s) IntraMuscular once PRN Glucose LESS THAN 70 milligrams/deciliter        CAPILLARY BLOOD GLUCOSE      POCT Blood Glucose.: 174 mg/dL (27 Mar 2020 22:20)  POCT Blood Glucose.: 210 mg/dL (27 Mar 2020 18:34)  POCT Blood Glucose.: 240 mg/dL (27 Mar 2020 17:07)  POCT Blood Glucose.: 133 mg/dL (27 Mar 2020 12:54)  POCT Blood Glucose.: 103 mg/dL (27 Mar 2020 07:51)    I&O's Summary    27 Mar 2020 07:01  -  28 Mar 2020 07:00  --------------------------------------------------------  IN: 700 mL / OUT: 2600 mL / NET: -1900 mL    PHYSICAL EXAM:  GENERAL: Elderly male sleeping comfortably in bed in NAD  EYES: conjunctiva and sclera clear  CHEST/LUNG: Clear to auscultation bilaterally; No rales, rhonchi, wheezing, or rubs  HEART: Regular rate and rhythm; No murmurs, rubs, or gallops  ABDOMEN: Soft, Nontender, Nondistended; Bowel sounds present  EXTREMITIES: b/l LE edema  NERVOUS SYSTEM: awake and alert, answering questions appropriately  LABS:                        8.3    4.27  )-----------( 145      ( 27 Mar 2020 12:30 )             26.6     03-27    140  |  103  |  28<H>  ----------------------------<  172<H>  3.4<L>   |  26  |  4.72<H>    Ca    7.8<L>      27 Mar 2020 12:30

## 2020-03-28 NOTE — PROGRESS NOTE ADULT - ASSESSMENT
63 yo M PMH CKD stage 5, htn, dm present with worsen weakness, LE edema, poor appetite x several weeks, noted have worsen renal function. plan to initiate HD this admission    CKD stage 5 not on HD   now with worsen renal function, fluid overload and weakness, planning to initiate HD on this admission  s/p permacath and 1st HD 3/26 tolerated well  2nd treatment 3/27  Plan for IHD today   fluid overloaded on lasix 80mg po daily  monitor bmp, fluid status  consent for HD in chart.    SW to set up out patient hd unit with [place] OR [facility] dialysis  pt stable for discharge once outpatient HD unit is established and confirmed. Discussed with SW on 3/26/2020    HTN  Improving  uf with hd  monitor post HD    Anemia  Hb below goal   iron def... iron sucrose 100mg with hd  monitor hb    Ckd-mbd  optimal pth and phos level  monitor

## 2020-03-29 RX ORDER — TAMSULOSIN HYDROCHLORIDE 0.4 MG/1
1 CAPSULE ORAL
Qty: 0 | Refills: 0 | DISCHARGE
Start: 2020-03-29

## 2020-03-29 RX ORDER — TAMSULOSIN HYDROCHLORIDE 0.4 MG/1
1 CAPSULE ORAL
Qty: 0 | Refills: 0 | DISCHARGE

## 2020-03-29 RX ADMIN — CARVEDILOL PHOSPHATE 25 MILLIGRAM(S): 80 CAPSULE, EXTENDED RELEASE ORAL at 22:41

## 2020-03-29 RX ADMIN — CARVEDILOL PHOSPHATE 25 MILLIGRAM(S): 80 CAPSULE, EXTENDED RELEASE ORAL at 05:34

## 2020-03-29 RX ADMIN — CARVEDILOL PHOSPHATE 25 MILLIGRAM(S): 80 CAPSULE, EXTENDED RELEASE ORAL at 17:52

## 2020-03-29 RX ADMIN — Medication 80 MILLIGRAM(S): at 05:32

## 2020-03-29 RX ADMIN — Medication 1: at 22:59

## 2020-03-29 RX ADMIN — Medication 100 MILLIGRAM(S): at 05:32

## 2020-03-29 RX ADMIN — AMLODIPINE BESYLATE 10 MILLIGRAM(S): 2.5 TABLET ORAL at 05:32

## 2020-03-29 RX ADMIN — Medication 100 MILLIGRAM(S): at 11:26

## 2020-03-29 RX ADMIN — Medication 100 MILLIGRAM(S): at 05:31

## 2020-03-29 RX ADMIN — Medication 0.1 MILLIGRAM(S): at 22:40

## 2020-03-29 RX ADMIN — Medication 0.1 MILLIGRAM(S): at 09:31

## 2020-03-29 RX ADMIN — Medication 100 MILLIGRAM(S): at 22:40

## 2020-03-29 RX ADMIN — Medication 2: at 12:04

## 2020-03-29 RX ADMIN — ATORVASTATIN CALCIUM 40 MILLIGRAM(S): 80 TABLET, FILM COATED ORAL at 22:39

## 2020-03-29 RX ADMIN — Medication 75 MICROGRAM(S): at 05:31

## 2020-03-29 RX ADMIN — TAMSULOSIN HYDROCHLORIDE 0.4 MILLIGRAM(S): 0.4 CAPSULE ORAL at 22:40

## 2020-03-29 NOTE — PROGRESS NOTE ADULT - SUBJECTIVE AND OBJECTIVE BOX
[facility]   MD Dwight Warner MD, D.O. Ruoru Wong, PA    From 7 AM - 5 PM:  OFFICE: [phone]  Dr. Cummins cell: [phone]  Dr. Womack cell: [phone]  Dr. Downs cell: [phone]  ELTON Carter cell: [phone]    From 5 PM - 7 AM: Answering Service: [phone]      RENAL FOLLOW UP NOTE  --------------------------------------------------------------------------------  HPI: Pt seen and examined at bedside. No complaints. Improved appetite     PAST HISTORY  --------------------------------------------------------------------------------  No significant changes to PMH, PSH, FHx, SHx, unless otherwise noted    ALLERGIES & MEDICATIONS  --------------------------------------------------------------------------------  Allergies    No Known Allergies    Intolerances      Standing Inpatient Medications  amLODIPine   Tablet 10 milliGRAM(s) Oral daily  atorvastatin 40 milliGRAM(s) Oral at bedtime  carvedilol 25 milliGRAM(s) Oral every 12 hours  cloNIDine 0.1 milliGRAM(s) Oral two times a day  dextrose 5%. 1000 milliLiter(s) IV Continuous <Continuous>  dextrose 50% Injectable 12.5 Gram(s) IV Push once  dextrose 50% Injectable 25 Gram(s) IV Push once  dextrose 50% Injectable 25 Gram(s) IV Push once  furosemide    Tablet 80 milliGRAM(s) Oral daily  hydrALAZINE 100 milliGRAM(s) Oral three times a day  insulin lispro (HumaLOG) corrective regimen sliding scale   SubCutaneous Before meals and at bedtime  iron sucrose Injectable 100 milliGRAM(s) IV Push <User Schedule>  levothyroxine 75 MICROGram(s) Oral daily  tamsulosin 0.4 milliGRAM(s) Oral at bedtime    PRN Inpatient Medications  dextrose 40% Gel 15 Gram(s) Oral once PRN  glucagon  Injectable 1 milliGRAM(s) IntraMuscular once PRN      REVIEW OF SYSTEMS  --------------------------------------------------------------------------------  General: no fever  CVS: no chest pain  RESP: no sob, no cough  ABD: no abdominal pain  : no dysuria  MSK: no edema     VITALS/PHYSICAL EXAM  --------------------------------------------------------------------------------  T(C): 37 (03-29-20 @ 04:09), Max: 37.2 (03-28-20 @ 19:15)  HR: 87 (03-29-20 @ 11:28) (61 - 92)  BP: 133/71 (03-29-20 @ 11:28) (126/68 - 156/87)  RR: 16 (03-29-20 @ 11:28) (16 - 18)  SpO2: 99% (03-29-20 @ 11:28) (94% - 99%)  Wt(kg): --        03-28-20 @ 07:01  -  03-29-20 @ 07:00  --------------------------------------------------------  IN: 0 mL / OUT: 800 mL / NET: -800 mL      Physical Exam:  	Gen: NAD  	HEENT: MMM  	Pulm: CTA B/L  	CV: S1S2  	Abd: Soft, +BS  	Ext: No LE edema B/L                      Neuro: Awake   	Skin: Warm and Dry   	Vascular access: + tunneled HD catheter     LABS/STUDIES  --------------------------------------------------------------------------------  Pending        Creatinine Trend:  SCr 4.72 [03-27 @ 12:30]  SCr 5.49 [03-26 @ 05:50]  SCr 5.42 [03-25 @ 13:10]    Iron 26, TIBC 170, %sat --      [03-26-20 @ 05:50]  Ferritin 158.8      [03-26-20 @ 05:50]  PTH 32.53 (Ca --)      [03-26-20 @ 05:50]   --    HBsAg NEGATIVE      [03-26-20 @ 17:20]  HCV 0.20, Nonreactive Hepatitis C AB  S/CO Ratio                        Interpretation  < 1.00                                   Non-Reactive  1.00 - 4.99                         Weakly-Reactive  >= 5.00                                Reactive  Non-Reactive: Aperson with a non-reactive HCV antibody  result is considered uninfected.  No further action is  needed unless recent infection is suspected.  In these  cases, consider repeat testing later to detect  seroconversion..  Weakly-Reactive: HCV antibody test is abnormal, HCV RNA  Qualitative test will follow.  Reactive: HCV antibody test is abnormal, HCV RNA  Qualitative test will follow.  Note: HCV antibody testing is performed on the [employer] system.      [03-26-20 @ 17:20]

## 2020-03-29 NOTE — PROGRESS NOTE ADULT - ASSESSMENT
65 yo M PMH CKD stage 5, htn, dm present with worsen weakness, LE edema, poor appetite x several weeks, noted have worsen renal function. plan to initiate HD this admission    CKD stage 5 not on HD   now with worsen renal function, fluid overload and weakness, planning to initiate HD on this admission  s/p permacath and 1st HD 3/26 tolerated well  2nd treatment 3/27  Pt tolerated HD 3/28 3rd session.  NO PLAN FOR HD MONDAY. Pt cleared for discharge once HD unit is confirmed  HD Tuesday if pt remains inpatient   continue on lasix 80mg po daily  monitor bmp, fluid status  consent for HD in chart.    SW to set up out patient hd unit with [facility] OR [place] [facility]  pt stable for discharge once outpatient HD unit is established and confirmed. Discussed with SW on 3/26/2020    HTN  controlled   monitor post HD    Anemia  Hb below goal   iron def... iron sucrose 100mg with hd  monitor hb    Ckd-mbd  optimal pth and phos level  monitor

## 2020-03-29 NOTE — PROGRESS NOTE ADULT - PROBLEM SELECTOR PLAN 6
Holding HSQ in anticipation of permacath    Dispo: awaiting outpatient set up with HD center. Will likely have to stay the weekend. Dispo: awaiting outpatient set up with HD center. Will likely have to stay the weekend.

## 2020-03-29 NOTE — PROGRESS NOTE ADULT - SUBJECTIVE AND OBJECTIVE BOX
Saravanan Viveros MD  Internal Medicine, PGY-1  Beeper: [phone] ([facility])/ [phone] ([facility])     Subjective:    Patient is a 64y old  Male who presents with a chief complaint of weakness, HD initiation (28 Mar 2020 12:44)    No acute overnight events.  Patient was seen and examined at bedside this AM. Denied fever, chills, nausea, vomiting, CP, palpitations, coughing, wheezing, SOB, and abdominal pain.      MEDICATIONS  (STANDING):  amLODIPine   Tablet 10 milliGRAM(s) Oral daily  atorvastatin 40 milliGRAM(s) Oral at bedtime  carvedilol 25 milliGRAM(s) Oral every 12 hours  cloNIDine 0.1 milliGRAM(s) Oral two times a day  dextrose 5%. 1000 milliLiter(s) (50 mL/Hr) IV Continuous <Continuous>  dextrose 50% Injectable 12.5 Gram(s) IV Push once  dextrose 50% Injectable 25 Gram(s) IV Push once  dextrose 50% Injectable 25 Gram(s) IV Push once  furosemide    Tablet 80 milliGRAM(s) Oral daily  hydrALAZINE 100 milliGRAM(s) Oral three times a day  insulin lispro (HumaLOG) corrective regimen sliding scale   SubCutaneous Before meals and at bedtime  iron sucrose Injectable 100 milliGRAM(s) IV Push <User Schedule>  levothyroxine 75 MICROGram(s) Oral daily  tamsulosin 0.4 milliGRAM(s) Oral at bedtime    MEDICATIONS  (PRN):  dextrose 40% Gel 15 Gram(s) Oral once PRN Blood Glucose LESS THAN 70 milliGRAM(s)/deciliter  glucagon  Injectable 1 milliGRAM(s) IntraMuscular once PRN Glucose LESS THAN 70 milligrams/deciliter      Objective:    Vitals: Vital Signs Last 24 Hrs  T(C): 37 (03-29-20 @ 04:09), Max: 37.2 (03-28-20 @ 19:15)  T(F): 98.6 (03-29-20 @ 04:09), Max: 99 (03-28-20 @ 19:15)  HR: 61 (03-29-20 @ 04:09) (61 - 92)  BP: 146/64 (03-29-20 @ 04:09) (143/76 - 159/82)  BP(mean): --  RR: 18 (03-29-20 @ 04:09) (16 - 18)  SpO2: 94% (03-29-20 @ 04:09) (94% - 97%)              I&O's Summary    27 Mar 2020 07:01  -  28 Mar 2020 07:00  --------------------------------------------------------  IN: 700 mL / OUT: 2600 mL / NET: -1900 mL    28 Mar 2020 07:01  -  29 Mar 2020 06:59  --------------------------------------------------------  IN: 0 mL / OUT: 800 mL / NET: -800 mL          PHYSICAL EXAM:  GENERAL: NAD, well-groomed, well-developed  HEAD:  Atraumatic, Normocephalic  EYES: PERRLA, conjunctiva and sclera clear  ENMT: No tonsillar erythema, exudates, or enlargement; Moist mucous membranes, Good dentition, No lesions  NECK: Supple, No JVD, Normal thyroid  CHEST/LUNG: Clear to auscultation bilaterally; No rales, rhonchi, wheezing, or rubs  HEART: Regular rate and rhythm; No murmurs, rubs, or gallops  ABDOMEN: Soft, Nontender, Nondistended; Bowel sounds present  EXTREMITIES:  2+ Peripheral Pulses, No clubbing, cyanosis, or edema  LYMPH: No lymphadenopathy noted  SKIN: No rashes or lesions noted  NERVOUS SYSTEM:  Alert & Oriented X3, Good concentration; Motor Strength 5/5 B/L upper and lower extremities; DTRs 2+ intact and symmetric                                               LABS:  03-27    140  |  103  |  28<H>  ----------------------------<  172<H>  3.4<L>   |  26  |  4.72<H>    Ca    7.8<L>      27 Mar 2020 12:30                                                      8.3    4.27  )-----------( 145      ( 27 Mar 2020 12:30 )             26.6     CAPILLARY BLOOD GLUCOSE      POCT Blood Glucose.: 163 mg/dL (28 Mar 2020 22:18)  POCT Blood Glucose.: 226 mg/dL (28 Mar 2020 16:59)  POCT Blood Glucose.: 136 mg/dL (28 Mar 2020 13:35)  POCT Blood Glucose.: 104 mg/dL (28 Mar 2020 08:58)        RECENT CULTURES:      TELEMETRY:    ECG:    TTE:    RADIOLOGY & ADDITIONAL TESTS:    Imaging Personally Reviewed:  [ ] YES  [ ] NO    Consultants involved in case:   Consultant(s) Notes Reviewed:  [ ] YES  [ ] NO:   Care Discussed with Consultants/Other Providers [ ] YES  [ ] NO Saravanan Viveros MD  Internal Medicine, PGY-1  Beeper: [phone] ([facility])/ [phone] ([facility])     Subjective:    Patient is a 64y old  Male who presents with a chief complaint of weakness, HD initiation (28 Mar 2020 12:44)    No acute overnight events.    Patient was seen and examined at bedside this AM. Patient tolerating HD well. States swelling in his legs have decreased. Denied fever, chills, nausea, vomiting, CP, palpitations, and SOB.      MEDICATIONS  (STANDING):  amLODIPine   Tablet 10 milliGRAM(s) Oral daily  atorvastatin 40 milliGRAM(s) Oral at bedtime  carvedilol 25 milliGRAM(s) Oral every 12 hours  cloNIDine 0.1 milliGRAM(s) Oral two times a day  dextrose 5%. 1000 milliLiter(s) (50 mL/Hr) IV Continuous <Continuous>  dextrose 50% Injectable 12.5 Gram(s) IV Push once  dextrose 50% Injectable 25 Gram(s) IV Push once  dextrose 50% Injectable 25 Gram(s) IV Push once  furosemide    Tablet 80 milliGRAM(s) Oral daily  hydrALAZINE 100 milliGRAM(s) Oral three times a day  insulin lispro (HumaLOG) corrective regimen sliding scale   SubCutaneous Before meals and at bedtime  iron sucrose Injectable 100 milliGRAM(s) IV Push <User Schedule>  levothyroxine 75 MICROGram(s) Oral daily  tamsulosin 0.4 milliGRAM(s) Oral at bedtime    MEDICATIONS  (PRN):  dextrose 40% Gel 15 Gram(s) Oral once PRN Blood Glucose LESS THAN 70 milliGRAM(s)/deciliter  glucagon  Injectable 1 milliGRAM(s) IntraMuscular once PRN Glucose LESS THAN 70 milligrams/deciliter      Objective:    Vitals: Vital Signs Last 24 Hrs  T(C): 37 (03-29-20 @ 04:09), Max: 37.2 (03-28-20 @ 19:15)  T(F): 98.6 (03-29-20 @ 04:09), Max: 99 (03-28-20 @ 19:15)  HR: 61 (03-29-20 @ 04:09) (61 - 92)  BP: 146/64 (03-29-20 @ 04:09) (143/76 - 159/82)  RR: 18 (03-29-20 @ 04:09) (16 - 18)  SpO2: 94% (03-29-20 @ 04:09) (94% - 97%)                I&O's Summary  27 Mar 2020 07:01  -  28 Mar 2020 07:00  --------------------------------------------------------  IN: 700 mL / OUT: 2600 mL / NET: -1900 mL    28 Mar 2020 07:01  -  29 Mar 2020 06:59  --------------------------------------------------------  IN: 0 mL / OUT: 800 mL / NET: -800 mL        PHYSICAL EXAM:  GENERAL: Elderly male resting comfortably in bed in NAD  EYES: conjunctiva and sclera clear  CHEST/LUNG: Clear to auscultation bilaterally; No rales, rhonchi, wheezing, or rubs  HEART: Regular rate and rhythm; No murmurs, rubs, or gallops  ABDOMEN: Soft, Nontender, Nondistended; Bowel sounds present  EXTREMITIES: No LE edema  : Right Permacath in place. No fluctuance and non-TTP  NERVOUS SYSTEM: awake and alert                                          LABS:  No labs this AM

## 2020-03-29 NOTE — PROGRESS NOTE ADULT - SUBJECTIVE AND OBJECTIVE BOX
Chace Andrews MD  Interventional Cardiology / Advance Heart Failure and Cardiac Transplant Specialist  [place] Office : [address] [place] [address] [place]. [address]  Tel:   [place] Office : [address] [facility] [place] [address]  Tel: [phone]  Cell : [phone]    Pt is lying in bed comfortable not in distress, no chest pains no SOB no palpitations  	  MEDICATIONS:  amLODIPine   Tablet 10 milliGRAM(s) Oral daily  carvedilol 25 milliGRAM(s) Oral every 12 hours  cloNIDine 0.1 milliGRAM(s) Oral two times a day  furosemide    Tablet 80 milliGRAM(s) Oral daily  hydrALAZINE 100 milliGRAM(s) Oral three times a day  tamsulosin 0.4 milliGRAM(s) Oral at bedtime            atorvastatin 40 milliGRAM(s) Oral at bedtime  dextrose 40% Gel 15 Gram(s) Oral once PRN  dextrose 50% Injectable 12.5 Gram(s) IV Push once  dextrose 50% Injectable 25 Gram(s) IV Push once  dextrose 50% Injectable 25 Gram(s) IV Push once  glucagon  Injectable 1 milliGRAM(s) IntraMuscular once PRN  insulin lispro (HumaLOG) corrective regimen sliding scale   SubCutaneous Before meals and at bedtime  levothyroxine 75 MICROGram(s) Oral daily    dextrose 5%. 1000 milliLiter(s) IV Continuous <Continuous>  iron sucrose Injectable 100 milliGRAM(s) IV Push <User Schedule>      PAST MEDICAL/SURGICAL HISTORY  PAST MEDICAL & SURGICAL HISTORY:  Type II diabetes mellitus  Essential hypertension  Chronic kidney disease (CKD), stage V  S/P appendectomy      SOCIAL HISTORY: Substance Use (street drugs): ( x ) never used  (  ) other:    FAMILY HISTORY:      REVIEW OF SYSTEMS:  CONSTITUTIONAL: No fever, weight loss, or fatigue  EYES: No eye pain, visual disturbances, or discharge  ENMT:  No difficulty hearing, tinnitus, vertigo; No sinus or throat pain  BREASTS: No pain, masses, or nipple discharge  GASTROINTESTINAL: No abdominal or epigastric pain. No nausea, vomiting, or hematemesis; No diarrhea or constipation. No melena or hematochezia.  GENITOURINARY: No dysuria, frequency, hematuria, or incontinence  NEUROLOGICAL: No headaches, memory loss, loss of strength, numbness, or tremors  ENDOCRINE: No heat or cold intolerance; No hair loss  MUSCULOSKELETAL: No joint pain or swelling; No muscle, back, or extremity pain  PSYCHIATRIC: No depression, anxiety, mood swings, or difficulty sleeping  HEME/LYMPH: No easy bruising, or bleeding gums  All others negative    PHYSICAL EXAM:  T(C): 37.2 (03-29-20 @ 15:36), Max: 37.2 (03-28-20 @ 19:15)  HR: 93 (03-29-20 @ 15:36) (61 - 93)  BP: 120/76 (03-29-20 @ 15:36) (120/76 - 155/82)  RR: 17 (03-29-20 @ 15:36) (16 - 18)  SpO2: 98% (03-29-20 @ 15:36) (94% - 99%)  Wt(kg): --  I&O's Summary    28 Mar 2020 07:01  -  29 Mar 2020 07:00  --------------------------------------------------------  IN: 0 mL / OUT: 800 mL / NET: -800 mL          GENERAL: NAD  EYES: EOMI, PERRLA, conjunctiva and sclera clear  ENMT: No tonsillar erythema, exudates, or enlargement; Moist mucous membranes, Good dentition, No lesions  Cardiovascular: Normal S1 S2, No JVD, No murmurs, No edema  Respiratory: Lungs clear to auscultation	  Gastrointestinal:  Soft, Non-tender, + BS	  Extremities: Normal range of motion, No clubbing, cyanosis or edema  LYMPH: No lymphadenopathy noted  NERVOUS SYSTEM:  Alert & Oriented X3, Good concentration; Motor Strength 5/5 B/L upper and lower extremities; DTRs 2+ intact and symmetric                      proBNP:   Lipid Profile:   HgA1c:   TSH:     Consultant(s) Notes Reviewed:  [x ] YES  [ ] NO    Care Discussed with Consultants/Other Providers [ x] YES  [ ] NO    Imaging Personally Reviewed independently:  [x] YES  [ ] NO    All labs, radiologic studies, vitals, orders and medications list reviewed. Patient is seen and examined at bedside. Case discussed with medical team.

## 2020-03-30 LAB
ANION GAP SERPL CALC-SCNC: 13 MMO/L — SIGNIFICANT CHANGE UP (ref 7–14)
BUN SERPL-MCNC: 32 MG/DL — HIGH (ref 7–23)
CALCIUM SERPL-MCNC: 7.6 MG/DL — LOW (ref 8.4–10.5)
CHLORIDE SERPL-SCNC: 100 MMOL/L — SIGNIFICANT CHANGE UP (ref 98–107)
CO2 SERPL-SCNC: 25 MMOL/L — SIGNIFICANT CHANGE UP (ref 22–31)
CREAT SERPL-MCNC: 4.64 MG/DL — HIGH (ref 0.5–1.3)
GLUCOSE SERPL-MCNC: 211 MG/DL — HIGH (ref 70–99)
HCT VFR BLD CALC: 29.6 % — LOW (ref 39–50)
HGB BLD-MCNC: 9.6 G/DL — LOW (ref 13–17)
MAGNESIUM SERPL-MCNC: 2 MG/DL — SIGNIFICANT CHANGE UP (ref 1.6–2.6)
MCHC RBC-ENTMCNC: 28.1 PG — SIGNIFICANT CHANGE UP (ref 27–34)
MCHC RBC-ENTMCNC: 32.4 % — SIGNIFICANT CHANGE UP (ref 32–36)
MCV RBC AUTO: 86.5 FL — SIGNIFICANT CHANGE UP (ref 80–100)
NRBC # FLD: 0 K/UL — SIGNIFICANT CHANGE UP (ref 0–0)
PHOSPHATE SERPL-MCNC: 3.1 MG/DL — SIGNIFICANT CHANGE UP (ref 2.5–4.5)
PLATELET # BLD AUTO: 162 K/UL — SIGNIFICANT CHANGE UP (ref 150–400)
PMV BLD: 11.6 FL — SIGNIFICANT CHANGE UP (ref 7–13)
POTASSIUM SERPL-MCNC: 3.6 MMOL/L — SIGNIFICANT CHANGE UP (ref 3.5–5.3)
POTASSIUM SERPL-SCNC: 3.6 MMOL/L — SIGNIFICANT CHANGE UP (ref 3.5–5.3)
RBC # BLD: 3.42 M/UL — LOW (ref 4.2–5.8)
RBC # FLD: 13.4 % — SIGNIFICANT CHANGE UP (ref 10.3–14.5)
SODIUM SERPL-SCNC: 138 MMOL/L — SIGNIFICANT CHANGE UP (ref 135–145)
WBC # BLD: 3.56 K/UL — LOW (ref 3.8–10.5)
WBC # FLD AUTO: 3.56 K/UL — LOW (ref 3.8–10.5)

## 2020-03-30 RX ORDER — HEPARIN SODIUM 5000 [USP'U]/ML
5000 INJECTION INTRAVENOUS; SUBCUTANEOUS EVERY 8 HOURS
Refills: 0 | Status: DISCONTINUED | OUTPATIENT
Start: 2020-03-30 | End: 2020-03-31

## 2020-03-30 RX ADMIN — Medication 100 MILLIGRAM(S): at 12:43

## 2020-03-30 RX ADMIN — Medication 2: at 17:24

## 2020-03-30 RX ADMIN — AMLODIPINE BESYLATE 10 MILLIGRAM(S): 2.5 TABLET ORAL at 06:07

## 2020-03-30 RX ADMIN — HEPARIN SODIUM 5000 UNIT(S): 5000 INJECTION INTRAVENOUS; SUBCUTANEOUS at 21:01

## 2020-03-30 RX ADMIN — Medication 100 MILLIGRAM(S): at 21:04

## 2020-03-30 RX ADMIN — Medication 100 MILLIGRAM(S): at 02:20

## 2020-03-30 RX ADMIN — Medication 80 MILLIGRAM(S): at 06:08

## 2020-03-30 RX ADMIN — ATORVASTATIN CALCIUM 40 MILLIGRAM(S): 80 TABLET, FILM COATED ORAL at 21:01

## 2020-03-30 RX ADMIN — TAMSULOSIN HYDROCHLORIDE 0.4 MILLIGRAM(S): 0.4 CAPSULE ORAL at 21:02

## 2020-03-30 RX ADMIN — Medication 0.1 MILLIGRAM(S): at 17:24

## 2020-03-30 RX ADMIN — Medication 2: at 12:43

## 2020-03-30 RX ADMIN — Medication 0.1 MILLIGRAM(S): at 06:09

## 2020-03-30 RX ADMIN — CARVEDILOL PHOSPHATE 25 MILLIGRAM(S): 80 CAPSULE, EXTENDED RELEASE ORAL at 17:24

## 2020-03-30 RX ADMIN — HEPARIN SODIUM 5000 UNIT(S): 5000 INJECTION INTRAVENOUS; SUBCUTANEOUS at 12:47

## 2020-03-30 RX ADMIN — Medication 75 MICROGRAM(S): at 06:07

## 2020-03-30 NOTE — PROGRESS NOTE ADULT - SUBJECTIVE AND OBJECTIVE BOX
Chace Andrews MD  Interventional Cardiology / Advance Heart Failure and Cardiac Transplant Specialist  [place] Office : [address] [place] [address] [facility] [address]  Tel:   [place] Office : [address] [facility] [place] [address]  Tel: [phone]  Cell : [phone]    Pt is lying in bed comfortable not in distress, no chest pains no SOB no palpitations  	  MEDICATIONS:  amLODIPine   Tablet 10 milliGRAM(s) Oral daily  carvedilol 25 milliGRAM(s) Oral every 12 hours  cloNIDine 0.1 milliGRAM(s) Oral two times a day  furosemide    Tablet 80 milliGRAM(s) Oral daily  heparin  Injectable 5000 Unit(s) SubCutaneous every 8 hours  hydrALAZINE 100 milliGRAM(s) Oral three times a day  tamsulosin 0.4 milliGRAM(s) Oral at bedtime            atorvastatin 40 milliGRAM(s) Oral at bedtime  dextrose 40% Gel 15 Gram(s) Oral once PRN  dextrose 50% Injectable 12.5 Gram(s) IV Push once  dextrose 50% Injectable 25 Gram(s) IV Push once  dextrose 50% Injectable 25 Gram(s) IV Push once  glucagon  Injectable 1 milliGRAM(s) IntraMuscular once PRN  insulin lispro (HumaLOG) corrective regimen sliding scale   SubCutaneous Before meals and at bedtime  levothyroxine 75 MICROGram(s) Oral daily    dextrose 5%. 1000 milliLiter(s) IV Continuous <Continuous>  iron sucrose Injectable 100 milliGRAM(s) IV Push <User Schedule>      PAST MEDICAL/SURGICAL HISTORY  PAST MEDICAL & SURGICAL HISTORY:  Type II diabetes mellitus  Essential hypertension  Chronic kidney disease (CKD), stage V  S/P appendectomy      SOCIAL HISTORY: Substance Use (street drugs): ( x ) never used  (  ) other:    FAMILY HISTORY:         PHYSICAL EXAM:  T(C): 36.7 (03-30-20 @ 06:08), Max: 37.6 (03-29-20 @ 17:50)  HR: 88 (03-30-20 @ 06:08) (87 - 102)  BP: 147/72 (03-30-20 @ 06:08) (120/76 - 164/90)  RR: 18 (03-30-20 @ 06:08) (16 - 19)  SpO2: 98% (03-30-20 @ 06:08) (98% - 100%)  Wt(kg): --  I&O's Summary    29 Mar 2020 07:01  -  30 Mar 2020 07:00  --------------------------------------------------------  IN: 300 mL / OUT: 0 mL / NET: 300 mL             EYES: EOMI, PERRLA, conjunctiva and sclera clear  ENMT: No tonsillar erythema, exudates, or enlargement; Moist mucous membranes, Good dentition, No lesions  Cardiovascular: Normal S1 S2, No JVD, No murmurs, No edema  Respiratory: Lungs clear to auscultation	  Gastrointestinal:  Soft, Non-tender, + BS	  Extremities: no edema                      proBNP:   Lipid Profile:   HgA1c:   TSH:     Consultant(s) Notes Reviewed:  [x ] YES  [ ] NO    Care Discussed with Consultants/Other Providers [ x] YES  [ ] NO    Imaging Personally Reviewed independently:  [x] YES  [ ] NO    All labs, radiologic studies, vitals, orders and medications list reviewed. Patient is seen and examined at bedside. Case discussed with medical team.

## 2020-03-30 NOTE — PROGRESS NOTE ADULT - PROBLEM SELECTOR PLAN 4
SBP 150s  -C/w home meds, amlodipine 10mg, Hydralazine 100mg TID, Coreg 25mg BID, and Clonidine .1 BID w/holding parameters  -per nephrology, c/w Lasix PO  -Monitor BP and adjust meds as needed

## 2020-03-30 NOTE — PROGRESS NOTE ADULT - SUBJECTIVE AND OBJECTIVE BOX
PROGRESS NOTE:   Authored by Owen Cueto MD, PGY 1, Pager [phone] [facility], [address] [facility]     Patient is a 64y old  Male who presents with a chief complaint of weakness, HD initiation (29 Mar 2020 14:20)      SUBJECTIVE / OVERNIGHT EVENTS:      MEDICATIONS  (STANDING):  amLODIPine   Tablet 10 milliGRAM(s) Oral daily  atorvastatin 40 milliGRAM(s) Oral at bedtime  carvedilol 25 milliGRAM(s) Oral every 12 hours  cloNIDine 0.1 milliGRAM(s) Oral two times a day  dextrose 5%. 1000 milliLiter(s) (50 mL/Hr) IV Continuous <Continuous>  dextrose 50% Injectable 12.5 Gram(s) IV Push once  dextrose 50% Injectable 25 Gram(s) IV Push once  dextrose 50% Injectable 25 Gram(s) IV Push once  furosemide    Tablet 80 milliGRAM(s) Oral daily  hydrALAZINE 100 milliGRAM(s) Oral three times a day  insulin lispro (HumaLOG) corrective regimen sliding scale   SubCutaneous Before meals and at bedtime  iron sucrose Injectable 100 milliGRAM(s) IV Push <User Schedule>  levothyroxine 75 MICROGram(s) Oral daily  tamsulosin 0.4 milliGRAM(s) Oral at bedtime    MEDICATIONS  (PRN):  dextrose 40% Gel 15 Gram(s) Oral once PRN Blood Glucose LESS THAN 70 milliGRAM(s)/deciliter  glucagon  Injectable 1 milliGRAM(s) IntraMuscular once PRN Glucose LESS THAN 70 milligrams/deciliter      CAPILLARY BLOOD GLUCOSE      POCT Blood Glucose.: 186 mg/dL (29 Mar 2020 22:49)  POCT Blood Glucose.: 205 mg/dL (29 Mar 2020 17:14)  POCT Blood Glucose.: 202 mg/dL (29 Mar 2020 11:57)  POCT Blood Glucose.: 125 mg/dL (29 Mar 2020 08:48)    I&O's Summary    29 Mar 2020 07:01  -  30 Mar 2020 07:00  --------------------------------------------------------  IN: 300 mL / OUT: 0 mL / NET: 300 mL        PHYSICAL EXAM:  Vital Signs Last 24 Hrs  T(C): 36.7 (30 Mar 2020 06:08), Max: 37.6 (29 Mar 2020 17:50)  T(F): 98 (30 Mar 2020 06:08), Max: 99.6 (29 Mar 2020 17:50)  HR: 88 (30 Mar 2020 06:08) (87 - 102)  BP: 147/72 (30 Mar 2020 06:08) (120/76 - 164/90)  BP(mean): --  RR: 18 (30 Mar 2020 06:08) (16 - 19)  SpO2: 98% (30 Mar 2020 06:08) (98% - 100%)    CONSTITUTIONAL: NAD  RESPIRATORY: Normal respiratory effort; lungs are clear to auscultation bilaterally  CARDIOVASCULAR: Regular rate and rhythm, normal S1 and S2, no murmur/rub/gallop  ABDOMEN: Nontender to palpation, Soft, Non-distended, normoactive bowel sounds,   MUSCLOSKELETAL: No LE edema   NEURO: Alert, good concentration     LABS:                      RADIOLOGY & ADDITIONAL TESTS:  Results Reviewed:   < from: Xray Chest 1 View- PORTABLE-Routine (03.28.20 @ 08:01) >  Clear lungs.  Right IJ central venous catheter with tip in right atrium.    < end of copied text > PROGRESS NOTE:   Authored by Owen Cueto MD, PGY 1, Pager [phone] [facility], [address] [facility]     Patient is a 64y old  Male who presents with a chief complaint of weakness, HD initiation (29 Mar 2020 14:20)      SUBJECTIVE / OVERNIGHT EVENTS: Pt seen and examined at bedside this AM. Pt denies chest pain, SOB, abd pain, nausea or vomiting.       MEDICATIONS  (STANDING):  amLODIPine   Tablet 10 milliGRAM(s) Oral daily  atorvastatin 40 milliGRAM(s) Oral at bedtime  carvedilol 25 milliGRAM(s) Oral every 12 hours  cloNIDine 0.1 milliGRAM(s) Oral two times a day  dextrose 5%. 1000 milliLiter(s) (50 mL/Hr) IV Continuous <Continuous>  dextrose 50% Injectable 12.5 Gram(s) IV Push once  dextrose 50% Injectable 25 Gram(s) IV Push once  dextrose 50% Injectable 25 Gram(s) IV Push once  furosemide    Tablet 80 milliGRAM(s) Oral daily  hydrALAZINE 100 milliGRAM(s) Oral three times a day  insulin lispro (HumaLOG) corrective regimen sliding scale   SubCutaneous Before meals and at bedtime  iron sucrose Injectable 100 milliGRAM(s) IV Push <User Schedule>  levothyroxine 75 MICROGram(s) Oral daily  tamsulosin 0.4 milliGRAM(s) Oral at bedtime    MEDICATIONS  (PRN):  dextrose 40% Gel 15 Gram(s) Oral once PRN Blood Glucose LESS THAN 70 milliGRAM(s)/deciliter  glucagon  Injectable 1 milliGRAM(s) IntraMuscular once PRN Glucose LESS THAN 70 milligrams/deciliter      CAPILLARY BLOOD GLUCOSE      POCT Blood Glucose.: 186 mg/dL (29 Mar 2020 22:49)  POCT Blood Glucose.: 205 mg/dL (29 Mar 2020 17:14)  POCT Blood Glucose.: 202 mg/dL (29 Mar 2020 11:57)  POCT Blood Glucose.: 125 mg/dL (29 Mar 2020 08:48)    I&O's Summary    29 Mar 2020 07:01  -  30 Mar 2020 07:00  --------------------------------------------------------  IN: 300 mL / OUT: 0 mL / NET: 300 mL        PHYSICAL EXAM:  Vital Signs Last 24 Hrs  T(C): 36.7 (30 Mar 2020 06:08), Max: 37.6 (29 Mar 2020 17:50)  T(F): 98 (30 Mar 2020 06:08), Max: 99.6 (29 Mar 2020 17:50)  HR: 88 (30 Mar 2020 06:08) (87 - 102)  BP: 147/72 (30 Mar 2020 06:08) (120/76 - 164/90)  BP(mean): --  RR: 18 (30 Mar 2020 06:08) (16 - 19)  SpO2: 98% (30 Mar 2020 06:08) (98% - 100%)    CONSTITUTIONAL: NAD  RESPIRATORY: Normal respiratory effort; lungs are clear to auscultation bilaterally  CARDIOVASCULAR: Regular rate and rhythm, normal S1 and S2, no murmur/rub/gallop. R permacath in place   ABDOMEN: Nontender to palpation, Soft, Non-distended, normoactive bowel sounds,   MUSCLOSKELETAL: No LE edema   NEURO: Alert, good concentration     LABS:                                CAPILLARY BLOOD GLUCOSE      POCT Blood Glucose.: 248 mg/dL (30 Mar 2020 12:42)  POCT Blood Glucose.: 186 mg/dL (29 Mar 2020 22:49)  POCT Blood Glucose.: 205 mg/dL (29 Mar 2020 17:14)                      RADIOLOGY & ADDITIONAL TESTS:  Results Reviewed:   < from: Xray Chest 1 View- PORTABLE-Routine (03.28.20 @ 08:01) >  Clear lungs.  Right IJ central venous catheter with tip in right atrium.    < end of copied text >

## 2020-03-30 NOTE — PROGRESS NOTE ADULT - PROBLEM SELECTOR PLAN 3
2/2 CKD  -iron panel showing low serum iron. C/w iron during HD  -Plan for epo per renal  -monitor hb 2/2 CKD  -iron panel showing low serum iron. C/w iron during HD  -monitor hb

## 2020-03-30 NOTE — PROGRESS NOTE ADULT - ASSESSMENT
63 yo M PMH CKD stage 5, htn, dm present with worsen weakness, LE edema, poor appetite x several weeks, noted have worsen renal function. plan to initiate HD this admission    CKD stage 5 not on HD   now with worsen renal function, fluid overload and weakness, planning to initiate HD on this admission  s/p permacath and 1st HD 3/26 tolerated well  2nd treatment 3/27  Pt tolerated HD 3/28 3rd session.  NO PLAN FOR HD today. Pt cleared for discharge once HD unit is confirmed  HD tmr if pt remains inpatient   continue on lasix 80mg po daily  monitor bmp, fluid status  consent for HD in chart.    SW to set up out patient hd unit with [facility]  pt stable for discharge once outpatient HD unit is established and confirmed.     HTN  controlled   monitor post HD    Anemia  Hb below goal   iron def... iron sucrose 100mg with hd  monitor hb    Ckd-mbd  optimal pth and phos level  monitor 63 yo M PMH CKD stage 5, htn, dm present with worsen weakness, LE edema, poor appetite x several weeks, noted have worsen renal function. plan to initiate HD this admission    CKD stage 5 not on HD   now with worsen renal function, fluid overload and weakness, planning to initiate HD on this admission  s/p permacath and 1st HD 3/26 tolerated well  2nd treatment 3/27  Pt tolerated HD 3/28 3rd session.  NO PLAN FOR HD today. Pt cleared for discharge once HD unit is confirmed  HD tmrw 1st shift if pt remains inpatient   continue on lasix 80mg po daily  monitor bmp, fluid status  consent for HD in chart.    SW to set up out patient hd unit with [facility] OR [facility]  pt stable for discharge once outpatient HD unit is established and confirmed.     HTN  controlled   monitor post HD    Anemia  Hb below goal   iron def... iron sucrose 100mg with hd  monitor hb    Ckd-mbd  optimal pth and phos level  monitor

## 2020-03-30 NOTE — PROGRESS NOTE ADULT - SUBJECTIVE AND OBJECTIVE BOX
[facility]   MD MARY LOU ABDUL DO ANAM SIDDIQUI ANGELA WONG, [place]    TEL:  OFFICE: [phone]  DR CHIN CELL: [phone]  DR. YOUNG CELL: [phone]  DR. ARZATE CELL: [phone]  HEIDI LINARES CELL: [phone]        Patient is a 64y old  Male who presents with a chief complaint of weakness, HD initiation (30 Mar 2020 11:38)      Patient seen and examined at bedside. No chest pain/sob    VITALS:  T(F): 98 (03-30-20 @ 06:08), Max: 99.6 (03-29-20 @ 17:50)  HR: 88 (03-30-20 @ 06:08)  BP: 147/72 (03-30-20 @ 06:08)  RR: 18 (03-30-20 @ 06:08)  SpO2: 98% (03-30-20 @ 06:08)  Wt(kg): --    03-29 @ 07:01  -  03-30 @ 07:00  --------------------------------------------------------  IN: 300 mL / OUT: 0 mL / NET: 300 mL          PHYSICAL EXAM:  Constitutional: NAD  Neck: No JVD  Respiratory: CTAB, no wheezes, rales or rhonchi  Cardiovascular: S1, S2, RRR  Gastrointestinal: BS+, soft, NT/ND  Extremities: No peripheral edema    Hospital Medications:   MEDICATIONS  (STANDING):  amLODIPine   Tablet 10 milliGRAM(s) Oral daily  atorvastatin 40 milliGRAM(s) Oral at bedtime  carvedilol 25 milliGRAM(s) Oral every 12 hours  cloNIDine 0.1 milliGRAM(s) Oral two times a day  dextrose 5%. 1000 milliLiter(s) (50 mL/Hr) IV Continuous <Continuous>  dextrose 50% Injectable 12.5 Gram(s) IV Push once  dextrose 50% Injectable 25 Gram(s) IV Push once  dextrose 50% Injectable 25 Gram(s) IV Push once  furosemide    Tablet 80 milliGRAM(s) Oral daily  heparin  Injectable 5000 Unit(s) SubCutaneous every 8 hours  hydrALAZINE 100 milliGRAM(s) Oral three times a day  insulin lispro (HumaLOG) corrective regimen sliding scale   SubCutaneous Before meals and at bedtime  iron sucrose Injectable 100 milliGRAM(s) IV Push <User Schedule>  levothyroxine 75 MICROGram(s) Oral daily  tamsulosin 0.4 milliGRAM(s) Oral at bedtime      LABS:        Creatinine Trend: 4.72 <--, 5.49 <--, 5.42 <--            Urine Studies:      Iron 26, TIBC 170, %sat --      [03-26-20 @ 05:50]  Ferritin 158.8      [03-26-20 @ 05:50]  PTH 32.53 (Ca --)      [03-26-20 @ 05:50]   --    HBsAg NEGATIVE      [03-26-20 @ 17:20]  HCV 0.20, Nonreactive Hepatitis C AB  S/CO Ratio                        Interpretation  < 1.00                                   Non-Reactive  1.00 - 4.99                         Weakly-Reactive  >= 5.00                                Reactive  Non-Reactive: [place] with a non-reactive HCV antibody  result is considered uninfected.  No further action is  needed unless recent infection is suspected.  In these  cases, consider repeat testing later to detect  seroconversion..  Weakly-Reactive: HCV antibody test is abnormal, HCV RNA  Qualitative test will follow.  Reactive: HCV antibody test is abnormal, HCV RNA  Qualitative test will follow.  Note: HCV antibody testing is performed on the [employer]   system.      [03-26-20 @ 17:20]      RADIOLOGY & ADDITIONAL STUDIES:

## 2020-03-31 ENCOUNTER — TRANSCRIPTION ENCOUNTER (OUTPATIENT)
Age: 65
End: 2020-03-31

## 2020-03-31 VITALS
DIASTOLIC BLOOD PRESSURE: 78 MMHG | HEART RATE: 80 BPM | SYSTOLIC BLOOD PRESSURE: 144 MMHG | TEMPERATURE: 98 F | RESPIRATION RATE: 16 BRPM

## 2020-03-31 LAB
ANION GAP SERPL CALC-SCNC: 13 MMO/L — SIGNIFICANT CHANGE UP (ref 7–14)
BUN SERPL-MCNC: 35 MG/DL — HIGH (ref 7–23)
CALCIUM SERPL-MCNC: 7.1 MG/DL — LOW (ref 8.4–10.5)
CHLORIDE SERPL-SCNC: 102 MMOL/L — SIGNIFICANT CHANGE UP (ref 98–107)
CO2 SERPL-SCNC: 24 MMOL/L — SIGNIFICANT CHANGE UP (ref 22–31)
CREAT SERPL-MCNC: 4.88 MG/DL — HIGH (ref 0.5–1.3)
GLUCOSE SERPL-MCNC: 108 MG/DL — HIGH (ref 70–99)
HCT VFR BLD CALC: 25 % — LOW (ref 39–50)
HGB BLD-MCNC: 8.1 G/DL — LOW (ref 13–17)
MAGNESIUM SERPL-MCNC: 1.8 MG/DL — SIGNIFICANT CHANGE UP (ref 1.6–2.6)
MCHC RBC-ENTMCNC: 27.8 PG — SIGNIFICANT CHANGE UP (ref 27–34)
MCHC RBC-ENTMCNC: 32.4 % — SIGNIFICANT CHANGE UP (ref 32–36)
MCV RBC AUTO: 85.9 FL — SIGNIFICANT CHANGE UP (ref 80–100)
NRBC # FLD: 0 K/UL — SIGNIFICANT CHANGE UP (ref 0–0)
PHOSPHATE SERPL-MCNC: 3.4 MG/DL — SIGNIFICANT CHANGE UP (ref 2.5–4.5)
PLATELET # BLD AUTO: 149 K/UL — LOW (ref 150–400)
PMV BLD: 11.4 FL — SIGNIFICANT CHANGE UP (ref 7–13)
POTASSIUM SERPL-MCNC: 3.4 MMOL/L — LOW (ref 3.5–5.3)
POTASSIUM SERPL-SCNC: 3.4 MMOL/L — LOW (ref 3.5–5.3)
RBC # BLD: 2.91 M/UL — LOW (ref 4.2–5.8)
RBC # FLD: 13.3 % — SIGNIFICANT CHANGE UP (ref 10.3–14.5)
SODIUM SERPL-SCNC: 139 MMOL/L — SIGNIFICANT CHANGE UP (ref 135–145)
WBC # BLD: 3.82 K/UL — SIGNIFICANT CHANGE UP (ref 3.8–10.5)
WBC # FLD AUTO: 3.82 K/UL — SIGNIFICANT CHANGE UP (ref 3.8–10.5)

## 2020-03-31 RX ORDER — FUROSEMIDE 40 MG
1 TABLET ORAL
Qty: 30 | Refills: 0
Start: 2020-03-31 | End: 2020-04-29

## 2020-03-31 RX ORDER — FUROSEMIDE 40 MG
1 TABLET ORAL
Qty: 0 | Refills: 0 | DISCHARGE

## 2020-03-31 RX ADMIN — Medication 75 MICROGRAM(S): at 06:20

## 2020-03-31 RX ADMIN — CARVEDILOL PHOSPHATE 25 MILLIGRAM(S): 80 CAPSULE, EXTENDED RELEASE ORAL at 06:20

## 2020-03-31 RX ADMIN — Medication 80 MILLIGRAM(S): at 06:20

## 2020-03-31 RX ADMIN — Medication 100 MILLIGRAM(S): at 06:20

## 2020-03-31 RX ADMIN — AMLODIPINE BESYLATE 10 MILLIGRAM(S): 2.5 TABLET ORAL at 06:20

## 2020-03-31 RX ADMIN — Medication 1: at 11:09

## 2020-03-31 RX ADMIN — IRON SUCROSE 100 MILLIGRAM(S): 20 INJECTION, SOLUTION INTRAVENOUS at 13:22

## 2020-03-31 RX ADMIN — HEPARIN SODIUM 5000 UNIT(S): 5000 INJECTION INTRAVENOUS; SUBCUTANEOUS at 08:49

## 2020-03-31 NOTE — PROGRESS NOTE ADULT - SUBJECTIVE AND OBJECTIVE BOX
PROGRESS NOTE:   Authored by Owen Cueto MD, PGY 1, Pager [phone] [facility], [address] [facility]     Patient is a 64y old  Male who presents with a chief complaint of weakness, HD initiation (30 Mar 2020 14:51)      SUBJECTIVE / OVERNIGHT EVENTS:      MEDICATIONS  (STANDING):  amLODIPine   Tablet 10 milliGRAM(s) Oral daily  atorvastatin 40 milliGRAM(s) Oral at bedtime  carvedilol 25 milliGRAM(s) Oral every 12 hours  cloNIDine 0.1 milliGRAM(s) Oral two times a day  dextrose 5%. 1000 milliLiter(s) (50 mL/Hr) IV Continuous <Continuous>  dextrose 50% Injectable 12.5 Gram(s) IV Push once  dextrose 50% Injectable 25 Gram(s) IV Push once  dextrose 50% Injectable 25 Gram(s) IV Push once  furosemide    Tablet 80 milliGRAM(s) Oral daily  heparin  Injectable 5000 Unit(s) SubCutaneous every 8 hours  hydrALAZINE 100 milliGRAM(s) Oral three times a day  insulin lispro (HumaLOG) corrective regimen sliding scale   SubCutaneous Before meals and at bedtime  iron sucrose Injectable 100 milliGRAM(s) IV Push <User Schedule>  levothyroxine 75 MICROGram(s) Oral daily  tamsulosin 0.4 milliGRAM(s) Oral at bedtime    MEDICATIONS  (PRN):  dextrose 40% Gel 15 Gram(s) Oral once PRN Blood Glucose LESS THAN 70 milliGRAM(s)/deciliter  glucagon  Injectable 1 milliGRAM(s) IntraMuscular once PRN Glucose LESS THAN 70 milligrams/deciliter      CAPILLARY BLOOD GLUCOSE      POCT Blood Glucose.: 148 mg/dL (30 Mar 2020 20:57)  POCT Blood Glucose.: 212 mg/dL (30 Mar 2020 17:11)  POCT Blood Glucose.: 248 mg/dL (30 Mar 2020 12:42)    I&O's Summary    30 Mar 2020 07:01  -  31 Mar 2020 07:00  --------------------------------------------------------  IN: 300 mL / OUT: 0 mL / NET: 300 mL        PHYSICAL EXAM:  Vital Signs Last 24 Hrs  T(C): 36.8 (31 Mar 2020 06:17), Max: 36.8 (30 Mar 2020 14:25)  T(F): 98.2 (31 Mar 2020 06:17), Max: 98.2 (30 Mar 2020 14:25)  HR: 84 (31 Mar 2020 06:17) (84 - 84)  BP: 117/61 (31 Mar 2020 06:17) (117/61 - 144/69)  BP(mean): --  RR: 18 (31 Mar 2020 06:17) (18 - 18)  SpO2: 91% (31 Mar 2020 06:17) (91% - 98%)    CONSTITUTIONAL: NAD  RESPIRATORY: Normal respiratory effort; lungs are clear to auscultation bilaterally  CARDIOVASCULAR: Regular rate and rhythm, normal S1 and S2, no murmur/rub/gallop  ABDOMEN: Nontender to palpation, Soft, Non-distended, normoactive bowel sounds,   MUSCLOSKELETAL: No LE edema   NEURO: Alert, good concentration     LABS:                        9.6    3.56  )-----------( 162      ( 30 Mar 2020 17:44 )             29.6     03-30    138  |  100  |  32<H>  ----------------------------<  211<H>  3.6   |  25  |  4.64<H>    Ca    7.6<L>      30 Mar 2020 17:44  Phos  3.1     03-30  Mg     2.0     03-30                  RADIOLOGY & ADDITIONAL TESTS:  Results Reviewed. PROGRESS NOTE:   Authored by Owen Cueto MD, PGY 1, Pager [phone] [facility], [address] [facility]     Patient is a 64y old  Male who presents with a chief complaint of weakness, HD initiation (30 Mar 2020 14:51)      SUBJECTIVE / OVERNIGHT EVENTS: Pt seen and examined at bedside this AM. Pt denies any symptoms.       MEDICATIONS  (STANDING):  amLODIPine   Tablet 10 milliGRAM(s) Oral daily  atorvastatin 40 milliGRAM(s) Oral at bedtime  carvedilol 25 milliGRAM(s) Oral every 12 hours  cloNIDine 0.1 milliGRAM(s) Oral two times a day  dextrose 5%. 1000 milliLiter(s) (50 mL/Hr) IV Continuous <Continuous>  dextrose 50% Injectable 12.5 Gram(s) IV Push once  dextrose 50% Injectable 25 Gram(s) IV Push once  dextrose 50% Injectable 25 Gram(s) IV Push once  furosemide    Tablet 80 milliGRAM(s) Oral daily  heparin  Injectable 5000 Unit(s) SubCutaneous every 8 hours  hydrALAZINE 100 milliGRAM(s) Oral three times a day  insulin lispro (HumaLOG) corrective regimen sliding scale   SubCutaneous Before meals and at bedtime  iron sucrose Injectable 100 milliGRAM(s) IV Push <User Schedule>  levothyroxine 75 MICROGram(s) Oral daily  tamsulosin 0.4 milliGRAM(s) Oral at bedtime    MEDICATIONS  (PRN):  dextrose 40% Gel 15 Gram(s) Oral once PRN Blood Glucose LESS THAN 70 milliGRAM(s)/deciliter  glucagon  Injectable 1 milliGRAM(s) IntraMuscular once PRN Glucose LESS THAN 70 milligrams/deciliter      CAPILLARY BLOOD GLUCOSE      POCT Blood Glucose.: 148 mg/dL (30 Mar 2020 20:57)  POCT Blood Glucose.: 212 mg/dL (30 Mar 2020 17:11)  POCT Blood Glucose.: 248 mg/dL (30 Mar 2020 12:42)    I&O's Summary    30 Mar 2020 07:01  -  31 Mar 2020 07:00  --------------------------------------------------------  IN: 300 mL / OUT: 0 mL / NET: 300 mL        PHYSICAL EXAM:  Vital Signs Last 24 Hrs  T(C): 36.8 (31 Mar 2020 06:17), Max: 36.8 (30 Mar 2020 14:25)  T(F): 98.2 (31 Mar 2020 06:17), Max: 98.2 (30 Mar 2020 14:25)  HR: 84 (31 Mar 2020 06:17) (84 - 84)  BP: 117/61 (31 Mar 2020 06:17) (117/61 - 144/69)  BP(mean): --  RR: 18 (31 Mar 2020 06:17) (18 - 18)  SpO2: 91% (31 Mar 2020 06:17) (91% - 98%)    CONSTITUTIONAL: NAD  RESPIRATORY: Normal respiratory effort; lungs are clear to auscultation bilaterally  CARDIOVASCULAR: Regular rate and rhythm, normal S1 and S2, no murmur/rub/gallop. R permacath in place   ABDOMEN: Nontender to palpation, Soft, Non-distended, normoactive bowel sounds,   MUSCLOSKELETAL: No LE edema   NEURO: Alert, good concentration    LABS:                        9.6    3.56  )-----------( 162      ( 30 Mar 2020 17:44 )             29.6     03-30    138  |  100  |  32<H>  ----------------------------<  211<H>  3.6   |  25  |  4.64<H>    Ca    7.6<L>      30 Mar 2020 17:44  Phos  3.1     03-30  Mg     2.0     03-30                  RADIOLOGY & ADDITIONAL TESTS:  Results Reviewed.

## 2020-03-31 NOTE — PROGRESS NOTE ADULT - SUBJECTIVE AND OBJECTIVE BOX
Chace Andrews MD  Interventional Cardiology / Advance Heart Failure and Cardiac Transplant Specialist  [place] Office : [address] [place] [address] [place] [address]  Tel:   [place] Office : [address] [facility] [place] [address]  Tel: [phone]  Cell : [phone]    Pt is lying in bed comfortable not in distress, no chest pains no SOB no palpitations  	  MEDICATIONS:  amLODIPine   Tablet 10 milliGRAM(s) Oral daily  carvedilol 25 milliGRAM(s) Oral every 12 hours  cloNIDine 0.1 milliGRAM(s) Oral two times a day  furosemide    Tablet 80 milliGRAM(s) Oral daily  heparin  Injectable 5000 Unit(s) SubCutaneous every 8 hours  hydrALAZINE 100 milliGRAM(s) Oral three times a day  tamsulosin 0.4 milliGRAM(s) Oral at bedtime            atorvastatin 40 milliGRAM(s) Oral at bedtime  dextrose 40% Gel 15 Gram(s) Oral once PRN  dextrose 50% Injectable 12.5 Gram(s) IV Push once  dextrose 50% Injectable 25 Gram(s) IV Push once  dextrose 50% Injectable 25 Gram(s) IV Push once  glucagon  Injectable 1 milliGRAM(s) IntraMuscular once PRN  insulin lispro (HumaLOG) corrective regimen sliding scale   SubCutaneous Before meals and at bedtime  levothyroxine 75 MICROGram(s) Oral daily    dextrose 5%. 1000 milliLiter(s) IV Continuous <Continuous>  iron sucrose Injectable 100 milliGRAM(s) IV Push <User Schedule>      PAST MEDICAL/SURGICAL HISTORY  PAST MEDICAL & SURGICAL HISTORY:  Type II diabetes mellitus  Essential hypertension  Chronic kidney disease (CKD), stage V  S/P appendectomy      SOCIAL HISTORY: Substance Use (street drugs): ( x ) never used  (  ) other:    FAMILY HISTORY:      REVIEW OF SYSTEMS:  CONSTITUTIONAL: No fever, weight loss, or fatigue  EYES: No eye pain, visual disturbances, or discharge  ENMT:  No difficulty hearing, tinnitus, vertigo; No sinus or throat pain  BREASTS: No pain, masses, or nipple discharge  GASTROINTESTINAL: No abdominal or epigastric pain. No nausea, vomiting, or hematemesis; No diarrhea or constipation. No melena or hematochezia.  GENITOURINARY: No dysuria, frequency, hematuria, or incontinence  NEUROLOGICAL: No headaches, memory loss, loss of strength, numbness, or tremors  ENDOCRINE: No heat or cold intolerance; No hair loss  MUSCULOSKELETAL: No joint pain or swelling; No muscle, back, or extremity pain  PSYCHIATRIC: No depression, anxiety, mood swings, or difficulty sleeping  HEME/LYMPH: No easy bruising, or bleeding gums  All others negative    PHYSICAL EXAM:  T(C): 37.1 (03-31-20 @ 12:05), Max: 37.1 (03-31-20 @ 12:05)  HR: 87 (03-31-20 @ 12:05) (84 - 88)  BP: 146/76 (03-31-20 @ 12:05) (117/61 - 146/76)  RR: 18 (03-31-20 @ 12:05) (17 - 18)  SpO2: 96% (03-31-20 @ 10:43) (91% - 98%)  Wt(kg): --  I&O's Summary    30 Mar 2020 07:01  -  31 Mar 2020 07:00  --------------------------------------------------------  IN: 300 mL / OUT: 0 mL / NET: 300 mL          GENERAL: NAD  EYES: EOMI, PERRLA, conjunctiva and sclera clear  ENMT: No tonsillar erythema, exudates, or enlargement; Moist mucous membranes, Good dentition, No lesions  Cardiovascular: Normal S1 S2, No JVD, No murmurs, No edema  Respiratory: Lungs clear to auscultation	  Gastrointestinal:  Soft, Non-tender, + BS	  Extremities: Normal range of motion, No clubbing, cyanosis or edema  LYMPH: No lymphadenopathy noted  NERVOUS SYSTEM:  Alert & Oriented X3, Good concentration; Motor Strength 5/5 B/L upper and lower extremities; DTRs 2+ intact and symmetric                                    8.1    3.82  )-----------( 149      ( 31 Mar 2020 05:58 )             25.0     03-31    139  |  102  |  35<H>  ----------------------------<  108<H>  3.4<L>   |  24  |  4.88<H>    Ca    7.1<L>      31 Mar 2020 05:58  Phos  3.4     03-31  Mg     1.8     03-31      proBNP:   Lipid Profile:   HgA1c:   TSH:     Consultant(s) Notes Reviewed:  [x ] YES  [ ] NO    Care Discussed with Consultants/Other Providers [ x] YES  [ ] NO    Imaging Personally Reviewed independently:  [x] YES  [ ] NO    All labs, radiologic studies, vitals, orders and medications list reviewed. Patient is seen and examined at bedside. Case discussed with medical team.

## 2020-03-31 NOTE — DISCHARGE NOTE NURSING/CASE MANAGEMENT/SOCIAL WORK - NSDCPNINST_GEN_ALL_CORE
patient alert and clinically stable. patient vss and afebrile. patient  denies pain and discomfort. patient had 2 hrs of Dialysis and Iron supplement provided. pt blood glucose wdl. safety maintained. pt being discharged to home with written and verbal information provided. plan of care provided. no sxs of acute distress noted. will continue to monitor.

## 2020-03-31 NOTE — PROGRESS NOTE ADULT - PROBLEM SELECTOR PROBLEM 2
Chronic kidney disease (CKD), stage V

## 2020-03-31 NOTE — PROGRESS NOTE ADULT - REASON FOR ADMISSION
weakness, HD initiation

## 2020-03-31 NOTE — DISCHARGE NOTE NURSING/CASE MANAGEMENT/SOCIAL WORK - NSDCFUADDAPPT_GEN_ALL_CORE_FT
1. After being discharged, please have close follow up with your nephrologist.  2. Please follow up with your PMD, upon discharge.

## 2020-03-31 NOTE — PROGRESS NOTE ADULT - SUBJECTIVE AND OBJECTIVE BOX
[facility]   MD MARY LOU ABDUL DO ANAM SIDDIQUI ANGELA WONG, PA    TEL:  OFFICE: [phone]  DR CHIN CELL: [phone]  DR. YOUNG CELL: [phone]  DR. ARZATE CELL: [phone]  HEIDI LINARES CELL: [phone]        Patient is a 64y old  Male who presents with a chief complaint of weakness, HD initiation (31 Mar 2020 07:42)      Patient seen and examined in HD. No chest pain/sob    VITALS:  T(F): 98.6 (03-31-20 @ 10:43), Max: 98.6 (03-31-20 @ 10:43)  HR: 88 (03-31-20 @ 10:43)  BP: 127/64 (03-31-20 @ 10:43)  RR: 17 (03-31-20 @ 10:43)  SpO2: 96% (03-31-20 @ 10:43)  Wt(kg): --  flow 300    03-30 @ 07:01  -  03-31 @ 07:00  --------------------------------------------------------  IN: 300 mL / OUT: 0 mL / NET: 300 mL          PHYSICAL EXAM:  Constitutional: NAD  Neck: No JVD  Respiratory: CTAB, no wheezes, rales or rhonchi  Cardiovascular: S1, S2, RRR  Gastrointestinal: BS+, soft, NT/ND  Extremities: No peripheral edema    Hospital Medications:   MEDICATIONS  (STANDING):  amLODIPine   Tablet 10 milliGRAM(s) Oral daily  atorvastatin 40 milliGRAM(s) Oral at bedtime  carvedilol 25 milliGRAM(s) Oral every 12 hours  cloNIDine 0.1 milliGRAM(s) Oral two times a day  dextrose 5%. 1000 milliLiter(s) (50 mL/Hr) IV Continuous <Continuous>  dextrose 50% Injectable 12.5 Gram(s) IV Push once  dextrose 50% Injectable 25 Gram(s) IV Push once  dextrose 50% Injectable 25 Gram(s) IV Push once  furosemide    Tablet 80 milliGRAM(s) Oral daily  heparin  Injectable 5000 Unit(s) SubCutaneous every 8 hours  hydrALAZINE 100 milliGRAM(s) Oral three times a day  insulin lispro (HumaLOG) corrective regimen sliding scale   SubCutaneous Before meals and at bedtime  iron sucrose Injectable 100 milliGRAM(s) IV Push <User Schedule>  levothyroxine 75 MICROGram(s) Oral daily  tamsulosin 0.4 milliGRAM(s) Oral at bedtime      LABS:  03-31    139  |  102  |  35<H>  ----------------------------<  108<H>  3.4<L>   |  24  |  4.88<H>    Ca    7.1<L>      31 Mar 2020 05:58  Phos  3.4     03-31  Mg     1.8     03-31      Creatinine Trend: 4.88 <--, 4.64 <--, 4.72 <--, 5.49 <--, 5.42 <--    Phosphorus Level, Serum: 3.4 mg/dL (03-31 @ 05:58)  Phosphorus Level, Serum: 3.1 mg/dL (03-30 @ 17:44)                              8.1    3.82  )-----------( 149      ( 31 Mar 2020 05:58 )             25.0     Urine Studies:      Iron 26, TIBC 170, %sat --      [03-26-20 @ 05:50]  Ferritin 158.8      [03-26-20 @ 05:50]  PTH 32.53 (Ca --)      [03-26-20 @ 05:50]   --    HBsAg NEGATIVE      [03-26-20 @ 17:20]  HCV 0.20, Nonreactive Hepatitis C AB  S/CO Ratio                        Interpretation  < 1.00                                   Non-Reactive  1.00 - 4.99                         Weakly-Reactive  >= 5.00                                Reactive  Non-Reactive: Aperson with a non-reactive HCV antibody  result is considered uninfected.  No further action is  needed unless recent infection is suspected.  In these  cases, consider repeat testing later to detect  seroconversion..  Weakly-Reactive: HCV antibody test is abnormal, HCV RNA  Qualitative test will follow.  Reactive: HCV antibody test is abnormal, HCV RNA  Qualitative test will follow.  Note: HCV antibody testing is performed on the FAB BAG system.      [03-26-20 @ 17:20]      RADIOLOGY & ADDITIONAL STUDIES:

## 2020-03-31 NOTE — PROGRESS NOTE ADULT - PROBLEM SELECTOR PLAN 5
Hold home PO meds  -FS and LDSS   -CCD

## 2020-03-31 NOTE — DISCHARGE NOTE NURSING/CASE MANAGEMENT/SOCIAL WORK - PATIENT PORTAL LINK FT
You can access the FollowMyHealth Patient Portal offered by [facility] by registering at the following website: [email or web address]. By joining [facility]’s FollowMyHealth portal, you will also be able to view your health information using other applications (apps) compatible with our system.

## 2020-03-31 NOTE — PROGRESS NOTE ADULT - ASSESSMENT
63 yo M PMH CKD stage 5, htn, dm present with worsen weakness, LE edema, poor appetite x several weeks, noted have worsen renal function. plan to initiate HD this admission    CKD stage 5 not on HD   now with worsen renal function, fluid overload and weakness, planning to initiate HD on this admission  s/p permacath and 1st HD 3/26 tolerated well  2nd treatment 3/27  Pt tolerated HD 3/28 3rd session.  HD today as ordered. vital stable access working well   continue on lasix 80mg po daily  monitor bmp, fluid status  consent for HD in chart.    pt stable for discharge once outpatient HD unit is established and confirmed.     HTN  controlled   monitor post HD    Anemia  Hb below goal   iron def... iron sucrose 100mg with hd  monitor hb    Ckd-mbd  optimal pth and phos level  monitor

## 2020-03-31 NOTE — PROGRESS NOTE ADULT - PROBLEM SELECTOR PROBLEM 5
Diabetes mellitus, type II

## 2020-03-31 NOTE — PROGRESS NOTE ADULT - ASSESSMENT
EKG - NSR     A/P     1) LE edema - likley sec to worsening renal function ,  2d echo shows normal LV cont lasix 80mg daily, s/p  permacath  and HD    2) HTN - cont clonidine coreg, amlodipine and hydralzaine    3) CKD - s/p  permacath getting HD f/u renal

## 2020-03-31 NOTE — PROGRESS NOTE ADULT - PROBLEM SELECTOR PROBLEM 1
Volume overload

## 2020-05-19 ENCOUNTER — APPOINTMENT (OUTPATIENT)
Dept: NEPHROLOGY | Facility: CLINIC | Age: 65
End: 2020-05-19

## 2020-05-19 ENCOUNTER — APPOINTMENT (OUTPATIENT)
Dept: TRANSPLANT | Facility: CLINIC | Age: 65
End: 2020-05-19

## 2020-06-01 ENCOUNTER — OUTPATIENT (OUTPATIENT)
Dept: OUTPATIENT SERVICES | Facility: HOSPITAL | Age: 65
LOS: 1 days | End: 2020-06-01
Payer: MEDICARE

## 2020-06-01 VITALS
TEMPERATURE: 98 F | OXYGEN SATURATION: 99 % | RESPIRATION RATE: 16 BRPM | WEIGHT: 149.91 LBS | DIASTOLIC BLOOD PRESSURE: 60 MMHG | HEIGHT: 67.5 IN | SYSTOLIC BLOOD PRESSURE: 140 MMHG | HEART RATE: 85 BPM

## 2020-06-01 DIAGNOSIS — N18.6 END STAGE RENAL DISEASE: ICD-10-CM

## 2020-06-01 DIAGNOSIS — N18.9 CHRONIC KIDNEY DISEASE, UNSPECIFIED: ICD-10-CM

## 2020-06-01 DIAGNOSIS — Z90.49 ACQUIRED ABSENCE OF OTHER SPECIFIED PARTS OF DIGESTIVE TRACT: Chronic | ICD-10-CM

## 2020-06-01 LAB
ALBUMIN SERPL ELPH-MCNC: 3.7 G/DL — SIGNIFICANT CHANGE UP (ref 3.3–5)
ALP SERPL-CCNC: 111 U/L — SIGNIFICANT CHANGE UP (ref 40–120)
ALT FLD-CCNC: 21 U/L — SIGNIFICANT CHANGE UP (ref 4–41)
ANION GAP SERPL CALC-SCNC: 16 MMO/L — HIGH (ref 7–14)
AST SERPL-CCNC: 18 U/L — SIGNIFICANT CHANGE UP (ref 4–40)
BILIRUB SERPL-MCNC: 0.3 MG/DL — SIGNIFICANT CHANGE UP (ref 0.2–1.2)
BUN SERPL-MCNC: 40 MG/DL — HIGH (ref 7–23)
CALCIUM SERPL-MCNC: 9.4 MG/DL — SIGNIFICANT CHANGE UP (ref 8.4–10.5)
CHLORIDE SERPL-SCNC: 96 MMOL/L — LOW (ref 98–107)
CO2 SERPL-SCNC: 25 MMOL/L — SIGNIFICANT CHANGE UP (ref 22–31)
CREAT SERPL-MCNC: 6.22 MG/DL — HIGH (ref 0.5–1.3)
GLUCOSE SERPL-MCNC: 219 MG/DL — HIGH (ref 70–99)
HBA1C BLD-MCNC: 5.9 % — HIGH (ref 4–5.6)
HCT VFR BLD CALC: 35.6 % — LOW (ref 39–50)
HGB BLD-MCNC: 11.5 G/DL — LOW (ref 13–17)
MCHC RBC-ENTMCNC: 29.4 PG — SIGNIFICANT CHANGE UP (ref 27–34)
MCHC RBC-ENTMCNC: 32.3 % — SIGNIFICANT CHANGE UP (ref 32–36)
MCV RBC AUTO: 91 FL — SIGNIFICANT CHANGE UP (ref 80–100)
NRBC # FLD: 0 K/UL — SIGNIFICANT CHANGE UP (ref 0–0)
PLATELET # BLD AUTO: 201 K/UL — SIGNIFICANT CHANGE UP (ref 150–400)
PMV BLD: 11 FL — SIGNIFICANT CHANGE UP (ref 7–13)
POTASSIUM SERPL-MCNC: 5.3 MMOL/L — SIGNIFICANT CHANGE UP (ref 3.5–5.3)
POTASSIUM SERPL-SCNC: 5.3 MMOL/L — SIGNIFICANT CHANGE UP (ref 3.5–5.3)
PROT SERPL-MCNC: 6.5 G/DL — SIGNIFICANT CHANGE UP (ref 6–8.3)
RBC # BLD: 3.91 M/UL — LOW (ref 4.2–5.8)
RBC # FLD: 17.2 % — HIGH (ref 10.3–14.5)
SODIUM SERPL-SCNC: 137 MMOL/L — SIGNIFICANT CHANGE UP (ref 135–145)
WBC # BLD: 5.89 K/UL — SIGNIFICANT CHANGE UP (ref 3.8–10.5)
WBC # FLD AUTO: 5.89 K/UL — SIGNIFICANT CHANGE UP (ref 3.8–10.5)

## 2020-06-01 PROCEDURE — 93010 ELECTROCARDIOGRAM REPORT: CPT

## 2020-06-01 RX ORDER — AMLODIPINE BESYLATE 2.5 MG/1
1 TABLET ORAL
Qty: 0 | Refills: 0 | DISCHARGE

## 2020-06-01 RX ORDER — SODIUM CHLORIDE 9 MG/ML
1000 INJECTION INTRAMUSCULAR; INTRAVENOUS; SUBCUTANEOUS
Refills: 0 | Status: DISCONTINUED | OUTPATIENT
Start: 2020-06-05 | End: 2020-06-20

## 2020-06-01 RX ORDER — CARVEDILOL PHOSPHATE 80 MG/1
1 CAPSULE, EXTENDED RELEASE ORAL
Qty: 0 | Refills: 0 | DISCHARGE

## 2020-06-01 RX ORDER — ATORVASTATIN CALCIUM 80 MG/1
1 TABLET, FILM COATED ORAL
Qty: 0 | Refills: 0 | DISCHARGE

## 2020-06-01 RX ORDER — HYDRALAZINE HCL 50 MG
1 TABLET ORAL
Qty: 0 | Refills: 0 | DISCHARGE

## 2020-06-01 RX ORDER — LEVOTHYROXINE SODIUM 125 MCG
1 TABLET ORAL
Qty: 0 | Refills: 0 | DISCHARGE

## 2020-06-01 RX ORDER — SITAGLIPTIN 50 MG/1
1 TABLET, FILM COATED ORAL
Qty: 0 | Refills: 0 | DISCHARGE

## 2020-06-01 NOTE — H&P PST ADULT - NEGATIVE ENMT SYMPTOMS
no ear pain/no tinnitus/no nose bleeds/no nasal obstruction/no post-nasal discharge/no hearing difficulty/no vertigo/no sinus symptoms/no nasal discharge/no nasal congestion

## 2020-06-01 NOTE — H&P PST ADULT - NSANTHOSAYNRD_GEN_A_CORE
never tested/No. KARO screening performed.  STOP BANG Legend: 0-2 = LOW Risk; 3-4 = INTERMEDIATE Risk; 5-8 = HIGH Risk

## 2020-06-01 NOTE — H&P PST ADULT - NSICDXPROBLEM_GEN_ALL_CORE_FT
PROBLEM DIAGNOSES  Problem: CRF (chronic renal failure)  Assessment and Plan: PROBLEM DIAGNOSES  Problem: CRF (chronic renal failure)  Assessment and Plan: Pt scheduled to have left arm AVF creation.  labs pending, ekg in chart.  preop instructions and COVID 19 testing instructions provided to pt.   Famitidine and chlorhexidine scrubs provided to pt with instructions.  FS and K,  ordered for DOS. PROBLEM DIAGNOSES  Problem: CRF (chronic renal failure)  Assessment and Plan: Pt scheduled to have left arm AVF creation.  labs pending, ekg in chart.  preop instructions and COVID 19 testing instructions provided to pt.   Famitidine and chlorhexidine scrubs provided to pt with instructions.  FS and K,  ordered for DOS.      R/O PROBLEM DIAGNOSES  Problem: Snoring  Assessment and Plan: KARO precautions recommended    Problem: DM (diabetes mellitus)  Assessment and Plan: a1c done, FS orderd for the day of surgery.  Pt advised not to take any diabetic meds on the DOS.

## 2020-06-01 NOTE — H&P PST ADULT - NEGATIVE NEUROLOGICAL SYMPTOMS
no vertigo/no loss of sensation/no difficulty walking/no transient paralysis/no weakness/no paresthesias/no generalized seizures/no headache

## 2020-06-01 NOTE — H&P PST ADULT - NSICDXPASTMEDICALHX_GEN_ALL_CORE_FT
PAST MEDICAL HISTORY:  Chronic kidney disease (CKD), stage V     Essential hypertension     Type II diabetes mellitus PAST MEDICAL HISTORY:  BPH (benign prostatic hyperplasia)     Chronic kidney disease (CKD), stage V     Essential hypertension     Hyperlipidemia     Hypothyroidism     Lower extremity edema     Type II diabetes mellitus

## 2020-06-01 NOTE — H&P PST ADULT - RS GEN PE MLT RESP DETAILS PC
good air movement/clear to auscultation bilaterally/no rales/breath sounds equal/respirations non-labored/no rhonchi/no wheezes

## 2020-06-02 ENCOUNTER — APPOINTMENT (OUTPATIENT)
Dept: TRANSPLANT | Facility: CLINIC | Age: 65
End: 2020-06-02

## 2020-06-02 ENCOUNTER — APPOINTMENT (OUTPATIENT)
Dept: NEPHROLOGY | Facility: CLINIC | Age: 65
End: 2020-06-02

## 2020-06-02 PROBLEM — E11.9 TYPE 2 DIABETES MELLITUS WITHOUT COMPLICATIONS: Chronic | Status: ACTIVE | Noted: 2020-03-25

## 2020-06-02 PROBLEM — I10 ESSENTIAL (PRIMARY) HYPERTENSION: Chronic | Status: ACTIVE | Noted: 2020-03-25

## 2020-06-03 ENCOUNTER — APPOINTMENT (OUTPATIENT)
Dept: DISASTER EMERGENCY | Facility: CLINIC | Age: 65
End: 2020-06-03

## 2020-06-04 ENCOUNTER — TRANSCRIPTION ENCOUNTER (OUTPATIENT)
Age: 65
End: 2020-06-04

## 2020-06-04 LAB — SARS-COV-2 N GENE NPH QL NAA+PROBE: NOT DETECTED

## 2020-06-04 NOTE — ASU PATIENT PROFILE, ADULT - PMH
BPH (benign prostatic hyperplasia)    Chronic kidney disease (CKD), stage V    Essential hypertension    Hyperlipidemia    Hypothyroidism    Lower extremity edema    Type II diabetes mellitus

## 2020-06-05 ENCOUNTER — OUTPATIENT (OUTPATIENT)
Dept: OUTPATIENT SERVICES | Facility: HOSPITAL | Age: 65
LOS: 1 days | Discharge: HOME | End: 2020-06-05
Payer: MEDICARE

## 2020-06-05 ENCOUNTER — APPOINTMENT (OUTPATIENT)
Dept: VASCULAR SURGERY | Facility: HOSPITAL | Age: 65
End: 2020-06-05

## 2020-06-05 VITALS
DIASTOLIC BLOOD PRESSURE: 59 MMHG | TEMPERATURE: 99 F | RESPIRATION RATE: 16 BRPM | SYSTOLIC BLOOD PRESSURE: 120 MMHG | HEART RATE: 80 BPM | OXYGEN SATURATION: 97 % | WEIGHT: 149.91 LBS | HEIGHT: 67.5 IN

## 2020-06-05 VITALS
SYSTOLIC BLOOD PRESSURE: 152 MMHG | DIASTOLIC BLOOD PRESSURE: 75 MMHG | RESPIRATION RATE: 16 BRPM | HEART RATE: 87 BPM | OXYGEN SATURATION: 99 %

## 2020-06-05 DIAGNOSIS — Z90.49 ACQUIRED ABSENCE OF OTHER SPECIFIED PARTS OF DIGESTIVE TRACT: Chronic | ICD-10-CM

## 2020-06-05 DIAGNOSIS — N18.6 END STAGE RENAL DISEASE: ICD-10-CM

## 2020-06-05 LAB
GAS PNL BLDV: 137 MMOL/L — SIGNIFICANT CHANGE UP (ref 136–146)
GLUCOSE BLDV-MCNC: 122 MG/DL — HIGH (ref 70–99)
POTASSIUM BLDV-SCNC: 4.6 MMOL/L — HIGH (ref 3.4–4.5)

## 2020-06-05 PROCEDURE — 36821 AV FUSION DIRECT ANY SITE: CPT | Mod: LT

## 2020-06-05 RX ORDER — SODIUM CHLORIDE 9 MG/ML
1000 INJECTION, SOLUTION INTRAVENOUS
Refills: 0 | Status: DISCONTINUED | OUTPATIENT
Start: 2020-06-05 | End: 2020-06-20

## 2020-06-05 RX ORDER — ACETAMINOPHEN 500 MG
2 TABLET ORAL
Qty: 0 | Refills: 0 | DISCHARGE
Start: 2020-06-05

## 2020-06-05 RX ORDER — ACETAMINOPHEN 500 MG
650 TABLET ORAL EVERY 6 HOURS
Refills: 0 | Status: DISCONTINUED | OUTPATIENT
Start: 2020-06-05 | End: 2020-06-20

## 2020-06-05 RX ORDER — OXYCODONE HYDROCHLORIDE 5 MG/1
5 TABLET ORAL EVERY 4 HOURS
Refills: 0 | Status: DISCONTINUED | OUTPATIENT
Start: 2020-06-05 | End: 2020-06-05

## 2020-06-05 NOTE — ASU DISCHARGE PLAN (ADULT/PEDIATRIC) - CARE PROVIDER_API CALL
Milla Craven)  Surgery; Vascular Surgery  [address], [address]  [place] [address]  Phone: [phone]  Fax: [phone]  Follow Up Time: 2 weeks

## 2020-06-08 PROBLEM — N40.0 BENIGN PROSTATIC HYPERPLASIA WITHOUT LOWER URINARY TRACT SYMPTOMS: Chronic | Status: ACTIVE | Noted: 2020-06-01

## 2020-06-08 PROBLEM — E03.9 HYPOTHYROIDISM, UNSPECIFIED: Chronic | Status: ACTIVE | Noted: 2020-06-01

## 2020-06-08 PROBLEM — R60.0 LOCALIZED EDEMA: Chronic | Status: ACTIVE | Noted: 2020-06-01

## 2020-06-08 PROBLEM — E78.5 HYPERLIPIDEMIA, UNSPECIFIED: Chronic | Status: ACTIVE | Noted: 2020-06-01

## 2020-06-24 ENCOUNTER — APPOINTMENT (OUTPATIENT)
Dept: VASCULAR SURGERY | Facility: CLINIC | Age: 65
End: 2020-06-24
Payer: MEDICARE

## 2020-06-24 VITALS
WEIGHT: 145 LBS | TEMPERATURE: 98.4 F | HEIGHT: 69 IN | HEART RATE: 86 BPM | DIASTOLIC BLOOD PRESSURE: 82 MMHG | SYSTOLIC BLOOD PRESSURE: 144 MMHG | BODY MASS INDEX: 21.48 KG/M2

## 2020-06-24 PROCEDURE — 99024 POSTOP FOLLOW-UP VISIT: CPT

## 2020-06-24 NOTE — PHYSICAL EXAM
[Normal Breath Sounds] : Normal breath sounds [Normal Heart Sounds] : normal heart sounds [2+] : left 2+ [FreeTextEntry1] : palpable thrill over fistula  [de-identified] : well appearing  [de-identified] : Left wrist incision - C/D/I

## 2020-06-24 NOTE — DISCUSSION/SUMMARY
[FreeTextEntry1] : Mr. Pederson is a 64M s/p left radiocephalic fistula, doing well. He denies any pain. His incision is well healed and the fistula has a palpable thrill. \par - Follow up for duplex in 1 month

## 2020-07-06 ENCOUNTER — APPOINTMENT (OUTPATIENT)
Dept: TRANSPLANT | Facility: CLINIC | Age: 65
End: 2020-07-06
Payer: COMMERCIAL

## 2020-07-06 ENCOUNTER — APPOINTMENT (OUTPATIENT)
Dept: NEPHROLOGY | Facility: CLINIC | Age: 65
End: 2020-07-06
Payer: COMMERCIAL

## 2020-07-06 VITALS
HEART RATE: 84 BPM | WEIGHT: 149 LBS | OXYGEN SATURATION: 98 % | SYSTOLIC BLOOD PRESSURE: 151 MMHG | DIASTOLIC BLOOD PRESSURE: 71 MMHG | HEIGHT: 69 IN | BODY MASS INDEX: 22.07 KG/M2 | TEMPERATURE: 97.5 F

## 2020-07-06 PROCEDURE — 99204 OFFICE O/P NEW MOD 45 MIN: CPT

## 2020-07-06 PROCEDURE — 99205 OFFICE O/P NEW HI 60 MIN: CPT

## 2020-07-06 RX ORDER — GLIPIZIDE 10 MG/1
10 TABLET ORAL TWICE DAILY
Refills: 0 | Status: ACTIVE | COMMUNITY

## 2020-07-06 RX ORDER — ATORVASTATIN CALCIUM 40 MG/1
40 TABLET, FILM COATED ORAL
Qty: 30 | Refills: 3 | Status: ACTIVE | COMMUNITY

## 2020-07-06 RX ORDER — TAMSULOSIN HYDROCHLORIDE 0.4 MG/1
0.4 CAPSULE ORAL
Qty: 30 | Refills: 11 | Status: ACTIVE | COMMUNITY

## 2020-07-06 RX ORDER — SITAGLIPTIN 50 MG/1
50 TABLET, FILM COATED ORAL DAILY
Qty: 90 | Refills: 3 | Status: ACTIVE | COMMUNITY

## 2020-07-06 RX ORDER — FUROSEMIDE 40 MG/1
40 TABLET ORAL TWICE DAILY
Qty: 60 | Refills: 4 | Status: ACTIVE | COMMUNITY

## 2020-07-06 NOTE — REASON FOR VISIT
[Initial] : an initial visit for [End-Stage Renal Disease] : end-stage renal disease [Kidney Transplant Evaluation] : kidney transplant evaluation [FreeTextEntry2] : Carlos Hager

## 2020-07-06 NOTE — HISTORY OF PRESENT ILLNESS
[Diabetes Mellitus] : Diabetes Mellitus [Hypertension] : Hypertension [TextBox_42] : \par HENRY VERAS is a 64 year old male who presents for kidney transplant evaluation. \par Cause of ESRD : DM, HTN \par Nephrologist:  Dr Redd , started on IHD since April 2020.  Access: [facility]  . Dialysis unit:  [facility]\par  \par Other PMH :\par Cardiac -has  HTN > 10 years. No  CAD, CHF or Afib\par Pulmonary-  no h/o COPD, Asthma\par Infections- no h/o TB, HIV, Hepatitis  \par Heme-no h/o malignancy or bleeding disorders.No DVT/PE\par Endo- has diabetes > 10 years. not on insulin. no retinopathy or neuropathy . no Thyroid disease\par Neuro- no h/o CVA\par Psych- no suicidal ideation, depression or anxiety. \par Sensitization events- no previous blood transfusions or previous transplant\par No infections or hospitalizations in the last 6 months \par \par Surgical h/o : appendectomy in 1985 \par \par Functional status: reports good functional status. uses treadmill few days a week. can walk 1-2 miles with no difficulty. \par \par Social history: lives with wife and has one daughter ( 21 years old, healthy) Used to work as a  at [employer]. retired now. Does not smoke, drink alcohol or use any illicit drugs. \par Family history:  has 3 sisters , healthy. No ESRD or h/o cancer in the family. Both parents had HTN \par \par \par \par

## 2020-07-06 NOTE — PLAN
[We should proceed with our protocol for kidney transplantation evaluation.] : We should proceed with our protocol for kidney transplantation evaluation.

## 2020-07-06 NOTE — END OF VISIT
[Time Spent: ___ minutes] : I have spent [unfilled] minutes of time on the encounter. [Attestation] : We have discussed with the patient at length the risks and benefits of transplantation. The patient is aware that transplantation is a process that requires a life-long commitment, and that it is a personal choice to proceed with it rather than to remain with alternative treatments such as dialysis.  We discussed the differences in quality of life and patient survival between remaining on dialysis versus receiving a kidney transplant. We also discussed increased benefits of receiving a live donor kidney transplant versus a  donor kidney transplant. We discussed the risks of kidney transplantation in depth. Surgical risks included but were not limited to bleeding, infection, graft thrombosis, ureteral and vascular strictures, delayed graft function, urine leak, the development of fluid collections, cardiac events, damage to native blood vessels and potential need for re operation postoperatively. We also discussed the risks of postoperative immunosuppression including but not limited to increased risk of infection, cancer, weight gain, new onset or worsening of diabetes or hypertension on a temporary or permanent basis, water retention, back pain, constipation, diarrhea, dizziness, headache, joint pain, loss of appetite, nausea, stomach pain or upset, trouble sleeping, vomiting, and/or mental or mood changes were.  The patient also understands that there is a risk of recurrent primary kidney disease in the transplanted organ. We also discussed the risks and benefits of receiving a kidney from a donor with a Kidney Donor Profile Index (KDPI) score greater than 85% including a greater risk of delayed graft function, increased need for dialysis within the first 2-3 weeks after transplant, and statistically lower graft survival at 3 years. We discussed the one-year observed and expected patient and graft survival rates in comparison to the national one-year averages as described in the evaluation consent forms. Patient consent is in the chart. Patient is aware that they may withdraw their consent for transplantation at any time. I have answered all of the patient's questions as well as all questions of those present with the patient.  At the culmination of the patient’s transplant candidacy workup, the patient will be presented to the Multidisciplinary Transplant Selection Committee for a decision whether to proceed with listing and transplantation.

## 2020-07-06 NOTE — HISTORY OF PRESENT ILLNESS
[Hypertension] : Hypertension [Diabetes Mellitus] : Diabetes Mellitus [Previous Kidney Transplant] : no previous kidney transplant [Cardiac History] : no cardiac history [Claudication/Angina] : no claudication/angina [Blood Transfusion] : no prior blood transfusion [Hx of DVT/Thrombosis/Miscarriage] : no history of dvt/thrombosis/miscarriage [Diabetes] : diabetes [Retinopathy] : no retinopathy [Neuropathy] : no neuropathy [Insulin] : no insulin treatment [Annual Foot Exams] : annual foot exams [Lower Extremity Ulcers] : no lower extremity ulcers [TextBox_20] : 2015 [TextBox_16] : 04/20 [TextBox_30] : 2 miles , has 3 flights in house - no problems [TextBox_24] : 2010 [TextBox_28] : 2010 [de-identified] : 64M for kidney transplant evaluation\par end stage kidney failure\par IHD TTS via right subclavian catheter\par left forearm AVF fashioned, waiting to mature\par still urinates - 3-4x/day\par occasional nocturia\par kidney biopsy July 2019 - advanced global glomerulosclerosis, tubular atrophy and interstitial fibrosis, severe arterial and arteriolar sclerosis c/w diabetic nephropathy\par \par PMHx\par no heart, respiratory, GI, vascular or neurological issues\par no covid symptoms or diagnosis\par \par Recent EKG and cardiac catheterization - no apparent issues\par Colonoscopy - every 5 years, last 2016/2017 no issues\par Prostate - recently checked, no issues\par \par Past surgery\par appendix removed 1985\par left forearm AVF\par \par Meds\par aspirin\par januvia\par amlodipine\par furosemide\par hydralazine\par levothyroxine\par tamsulosin\par glipizide\par carveilol\par atovastatin\par clonidine\par \par Allergies - none\par \par Fam Hx\par parents - HT and DM\par \par SHx\par lives with wife and daughter (21 - studying computer science)\par non-smoker\par non-drinker\par used to work at [employer] as , retired 2016\par does work around the house and Known \par \par Living donation - none identified at the moment

## 2020-07-06 NOTE — ASSESSMENT
[Excellent candidate] : an excellent candidate. We should proceed with our protocol for evaluation for kidney transplantation. [FreeTextEntry1] : cardiac clearance\par obtain colonoscopy report, and PSA\par routine blood tests and serology\par covid testing\par CXR\par U/S vessels

## 2020-07-06 NOTE — ASSESSMENT
[FreeTextEntry1] : \par 1. ESRD on dialysis -  Mr. HENRY VERAS  is a good candidate for kidney transplantation and would benefit from transplantation \par 2.Cardiac risk: will get results of recent  echo, stress test. \par 3. Cancer screening:  will obtain colonoscopy result. Check PSA \par 4. ID: Serology for acute and chronic viral infections. Screening for latent TB\par 5. Imaging: needs CTA, CXR \par 6.Diabetes Mellitus: check HbA1c \par 7.Hypertension- managed by PMD \par \par I have personally discussed the risks and benefits of transplantation and patient attended transplant education class where the following was disclosed:\par  \par Reviewed factors affecting survival and morbidity while on dialysis, the transplant wait list and reviewed pat-operative and long-term risk factors affecting outcome in kidney transplantation. \par  \par One year SRTR outcomes for national and [facility] were discussed in regards to patient survival and graft survival after transplantation. \par  \par Details of transplant surgery, including complications were discussed.\par Immunosuppression and complications including infection including life threatening sepsis and opportunistic infections, malignancy and new onset diabetes were discussed. \par  \par Benefits of live donor transplantation as well as variability in wait times across regions and multiple listing were discussed. \par KDPI >85% and PHS high risk criteria donors were discussed. \par HCV kidney transplantation was discussed.\par  \par Will proceed with completing/ updating work up and listing for transplant/ live donor transplant once work up is reviewed and found to be acceptable by multidisciplinary listing committee.\par

## 2020-07-06 NOTE — PHYSICAL EXAM
[In Left Forearm] : fistula/graft in left forearm [] : right dorsalis pedis palpable [Normal] : normal [TextBox_25] : appendix scar

## 2020-07-08 LAB
ABO + RH PNL BLD: NORMAL
ABO + RH PNL BLD: NORMAL
ALBUMIN SERPL ELPH-MCNC: 4.5 G/DL
ALP BLD-CCNC: 99 U/L
ALT SERPL-CCNC: 17 U/L
ANION GAP SERPL CALC-SCNC: 17 MMOL/L
APPEARANCE: CLEAR
AST SERPL-CCNC: 12 U/L
BACTERIA: NEGATIVE
BASOPHILS # BLD AUTO: 0.01 K/UL
BASOPHILS NFR BLD AUTO: 0.2 %
BILIRUB SERPL-MCNC: 0.2 MG/DL
BILIRUBIN URINE: NEGATIVE
BLOOD URINE: NEGATIVE
BUN SERPL-MCNC: 58 MG/DL
C PEPTIDE SERPL-MCNC: 11.2 NG/ML
CALCIUM SERPL-MCNC: 9.6 MG/DL
CHLORIDE SERPL-SCNC: 101 MMOL/L
CHOLEST SERPL-MCNC: 87 MG/DL
CHOLEST/HDLC SERPL: 2.3 RATIO
CMV IGG SERPL QL: >10 U/ML
CMV IGG SERPL-IMP: POSITIVE
CO2 SERPL-SCNC: 25 MMOL/L
COLOR: YELLOW
CREAT SERPL-MCNC: 6.39 MG/DL
CREAT SPEC-SCNC: 87 MG/DL
CREAT/PROT UR: 4.8 RATIO
EBV DNA SERPL NAA+PROBE-ACNC: NOT DETECTED IU/ML
EBV EA AB SER IA-ACNC: 8.2 U/ML
EBV EA AB TITR SER IF: POSITIVE
EBV EA IGG SER QL IA: 485 U/ML
EBV EA IGG SER-ACNC: NEGATIVE
EBV EA IGM SER IA-ACNC: NEGATIVE
EBV PATRN SPEC IB-IMP: NORMAL
EBV VCA IGG SER IA-ACNC: 249 U/ML
EBV VCA IGM SER QL IA: 10.5 U/ML
EBVPCR LOG: NOT DETECTED LOG10IU/ML
EOSINOPHIL # BLD AUTO: 0.2 K/UL
EOSINOPHIL NFR BLD AUTO: 4.4 %
EPSTEIN-BARR VIRUS CAPSID ANTIGEN IGG: POSITIVE
GLUCOSE QUALITATIVE U: ABNORMAL
GLUCOSE SERPL-MCNC: 181 MG/DL
HAV IGM SER QL: NONREACTIVE
HBV CORE IGG+IGM SER QL: NONREACTIVE
HBV SURFACE AB SER QL: NONREACTIVE
HBV SURFACE AB SERPL IA-ACNC: <3 MIU/ML
HBV SURFACE AG SER QL: NONREACTIVE
HCG SERPL-MCNC: 1 MIU/ML
HCT VFR BLD CALC: 39.2 %
HCV AB SER QL: NONREACTIVE
HCV S/CO RATIO: 0.14 S/CO
HDLC SERPL-MCNC: 38 MG/DL
HEPATITIS A IGG ANTIBODY: NONREACTIVE
HGB BLD-MCNC: 12.1 G/DL
HIV1+2 AB SPEC QL IA.RAPID: NONREACTIVE
HSV 1+2 IGG SER IA-IMP: NEGATIVE
HSV 1+2 IGG SER IA-IMP: POSITIVE
HSV1 IGG SER QL: 23.8 INDEX
HSV2 IGG SER QL: 0.11 INDEX
HYALINE CASTS: 2 /LPF
IMM GRANULOCYTES NFR BLD AUTO: 0.4 %
KETONES URINE: NEGATIVE
LDLC SERPL CALC-MCNC: 34 MG/DL
LEUKOCYTE ESTERASE URINE: NEGATIVE
LYMPHOCYTES # BLD AUTO: 0.96 K/UL
LYMPHOCYTES NFR BLD AUTO: 21 %
M TB IFN-G BLD-IMP: NEGATIVE
MAGNESIUM SERPL-MCNC: 2.9 MG/DL
MAN DIFF?: NORMAL
MCHC RBC-ENTMCNC: 28.6 PG
MCHC RBC-ENTMCNC: 30.9 GM/DL
MCV RBC AUTO: 92.7 FL
MICROSCOPIC-UA: NORMAL
MONOCYTES # BLD AUTO: 0.55 K/UL
MONOCYTES NFR BLD AUTO: 12 %
NEUTROPHILS # BLD AUTO: 2.83 K/UL
NEUTROPHILS NFR BLD AUTO: 62 %
NITRITE URINE: NEGATIVE
PH URINE: 8
PHOSPHATE SERPL-MCNC: 4.9 MG/DL
PLATELET # BLD AUTO: 149 K/UL
POTASSIUM SERPL-SCNC: 5.1 MMOL/L
PROT SERPL-MCNC: 6.8 G/DL
PROT UR-MCNC: 420 MG/DL
PROTEIN URINE: ABNORMAL
PSA SERPL-MCNC: 1.42 NG/ML
QUANTIFERON TB PLUS MITOGEN MINUS NIL: 5.36 IU/ML
QUANTIFERON TB PLUS NIL: 0.03 IU/ML
QUANTIFERON TB PLUS TB1 MINUS NIL: 0 IU/ML
QUANTIFERON TB PLUS TB2 MINUS NIL: 0 IU/ML
RBC # BLD: 4.23 M/UL
RBC # FLD: 14.2 %
RED BLOOD CELLS URINE: 19 /HPF
RUBV IGG FLD-ACNC: >33 INDEX
RUBV IGG SER-IMP: POSITIVE
SODIUM SERPL-SCNC: 143 MMOL/L
SPECIFIC GRAVITY URINE: 1.02
SQUAMOUS EPITHELIAL CELLS: 1 /HPF
T GONDII AB SER-IMP: NEGATIVE
T GONDII IGG SER QL: <3 IU/ML
T PALLIDUM AB SER QL IA: NEGATIVE
TRIGL SERPL-MCNC: 77 MG/DL
URATE SERPL-MCNC: 6.6 MG/DL
UROBILINOGEN URINE: NORMAL
VZV AB TITR SER: POSITIVE
VZV IGG SER IF-ACNC: 3643 INDEX
WBC # FLD AUTO: 4.57 K/UL
WHITE BLOOD CELLS URINE: 0 /HPF

## 2020-07-24 ENCOUNTER — APPOINTMENT (OUTPATIENT)
Dept: VASCULAR SURGERY | Facility: CLINIC | Age: 65
End: 2020-07-24
Payer: MEDICARE

## 2020-07-24 PROCEDURE — 93990 DOPPLER FLOW TESTING: CPT

## 2020-07-30 ENCOUNTER — APPOINTMENT (OUTPATIENT)
Dept: SURGERY | Facility: CLINIC | Age: 65
End: 2020-07-30
Payer: MEDICARE

## 2020-07-30 VITALS
BODY MASS INDEX: 20.76 KG/M2 | WEIGHT: 145 LBS | SYSTOLIC BLOOD PRESSURE: 164 MMHG | HEART RATE: 89 BPM | DIASTOLIC BLOOD PRESSURE: 69 MMHG | HEIGHT: 70 IN

## 2020-07-30 PROCEDURE — 99204 OFFICE O/P NEW MOD 45 MIN: CPT

## 2020-07-30 NOTE — ASSESSMENT
[FreeTextEntry1] : PET and CT requested. to call next week for results. will likely require glossectomy and neck dissection and reconstruction, pending results of above and if able to undergo due to multiple comorbidities. discussed that I no longer perform this procedure. given names of colleagues who do.

## 2020-07-30 NOTE — CONSULT LETTER
[Dear  ___] : Dear  [unfilled], [Consult Letter:] : I had the pleasure of evaluating your patient, [unfilled]. [Please see my note below.] : Please see my note below. [Consult Closing:] : Thank you very much for allowing me to participate in the care of this patient.  If you have any questions, please do not hesitate to contact me. [Sincerely,] : Sincerely, [FreeTextEntry2] : Dr. Harry Sacks, Dr. Burak Cha [DrSil  ___] : Dr. MARTE [FreeTextEntry3] : Sukhdev Walter MD, FACS\par System Director, Endocrine Surgery\par [facility]\par Assistant Clinical Professor of Surgery\par [employer] [facility]

## 2020-07-30 NOTE — REASON FOR VISIT
[Initial Consultation] : an initial consultation for [FreeTextEntry2] : Squamous cell carcinoma of tongue

## 2020-07-30 NOTE — PHYSICAL EXAM
[de-identified] : no palpable thyroid nodules [Laryngoscopy Performed] : laryngoscopy was performed, see procedure section for findings [de-identified] : 4.5 cm left posterior mobile tongue ulcerated mass with deeper extension, tender.   [Midline] : located in midline position [Normal] : orientation to person, place, and time: normal [de-identified] : 2 cm left upper jugular node, well circumscribed and mobile [de-identified] : indirect  laryngoscopy shows normal vocal cord mobility bilaterally with no lesions noted

## 2020-07-30 NOTE — HISTORY OF PRESENT ILLNESS
[de-identified] : Pt c/o lesion tongue for 2 months,  painful and increasing in size.  denies bleeding, dysphagia, hoarseness, history of trauma or weight loss.  no history of tobacco use.  on hemodialysis tiw\par biopsy:   SCC, well differentiated

## 2020-08-03 ENCOUNTER — APPOINTMENT (OUTPATIENT)
Dept: OTOLARYNGOLOGY | Facility: CLINIC | Age: 65
End: 2020-08-03
Payer: MEDICARE

## 2020-08-03 VITALS
OXYGEN SATURATION: 99 % | SYSTOLIC BLOOD PRESSURE: 159 MMHG | HEIGHT: 70 IN | HEART RATE: 87 BPM | BODY MASS INDEX: 20.76 KG/M2 | DIASTOLIC BLOOD PRESSURE: 74 MMHG | TEMPERATURE: 98 F | WEIGHT: 145 LBS

## 2020-08-03 PROCEDURE — 99204 OFFICE O/P NEW MOD 45 MIN: CPT

## 2020-08-04 RX ORDER — ASPIRIN 325 MG/1
TABLET, FILM COATED ORAL
Refills: 0 | Status: ACTIVE | COMMUNITY

## 2020-08-04 RX ORDER — LEVOTHYROXINE SODIUM 0.07 MG/1
75 TABLET ORAL
Qty: 90 | Refills: 0 | Status: ACTIVE | COMMUNITY
Start: 2020-07-17

## 2020-08-04 RX ORDER — CLONIDINE HYDROCHLORIDE 0.1 MG/1
0.1 TABLET ORAL
Refills: 0 | Status: DISCONTINUED | COMMUNITY
End: 2020-08-04

## 2020-08-05 NOTE — ADDENDUM
[FreeTextEntry1] : Speech-Language Pathology Note:\par Pt seen in clinic with Dr. Marie. Pt with left lateral tongue lesion c/o weight loss due to difficulty with swallowing characterized primarily by tongue pain. Pt reports that he’s only taking smoothies and soups by mouth because he cannot tolerate soft solid or puree textures. We discussed use of pain medication to control odynophagia, but Pt indicated he was not interested in trying that right now. He reports that his wife prepares all meals for him and he prefers to continue having her blend/liquefy all PO intake. As treatment plan is pending (surgery first vs enrollment in clinical trial) education re changes to speech & swallowing was limited at this time. Pt would benefit from further education with SLP once treatment plan is established. \par -Sol Macedo, CCC-SLP\par

## 2020-08-05 NOTE — ASSESSMENT
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 [id number] trial at length.\par - Will likely require left glossectomy, neck dissection, free flap.\par - Obtain pathology slides for internal review.\par - Will discuss at multidisciplinary tumor board.\par - Offered to discuss with his wife or support system; pt deferred and wishes to tell his wife himself.  Recommended and advised to have family included in plan of care discussion.\par - Pt verbalized understanding of above.\par

## 2020-08-05 NOTE — HISTORY OF PRESENT ILLNESS
[de-identified] : 64M nonsmoker with hx ESRD on hemodialysis Thursdays and Saturdays, DM, HTN presents with a painful, enlarging left posterior tongue lesion x 2 months.  He had a biopsy on 7/17/20 showing well-differentiated SCC.  Reports it is painful to talk, eat and drink.  Denies hx of smoking, ETOH, or chewing tobacco. Denies significant weight loss, dysphonia, dysphagia, bleeding, fever/chills.

## 2020-08-05 NOTE — PHYSICAL EXAM
[Normal] : orientation to person, place, and time: normal [de-identified] : palpable left LN, soft, mobile [de-identified] : Approximately 3 x 2 cm exophytic, ulcerated mass, about 1 cm in thickness, does not extend to floor of mouth, BOT or cross midilne

## 2020-08-07 ENCOUNTER — APPOINTMENT (OUTPATIENT)
Dept: MRI IMAGING | Facility: HOSPITAL | Age: 65
End: 2020-08-07

## 2020-08-07 ENCOUNTER — RESULT REVIEW (OUTPATIENT)
Age: 65
End: 2020-08-07

## 2020-08-07 ENCOUNTER — OUTPATIENT (OUTPATIENT)
Dept: OUTPATIENT SERVICES | Facility: HOSPITAL | Age: 65
LOS: 1 days | End: 2020-08-07
Payer: COMMERCIAL

## 2020-08-07 DIAGNOSIS — Z90.49 ACQUIRED ABSENCE OF OTHER SPECIFIED PARTS OF DIGESTIVE TRACT: Chronic | ICD-10-CM

## 2020-08-07 LAB — GLUCOSE BLDC GLUCOMTR-MCNC: 121 MG/DL — HIGH (ref 70–99)

## 2020-08-07 PROCEDURE — 78815 PET IMAGE W/CT SKULL-THIGH: CPT | Mod: 26

## 2020-08-07 PROCEDURE — 70543 MRI ORBT/FAC/NCK W/O &W/DYE: CPT | Mod: 26

## 2020-08-07 PROCEDURE — A9552: CPT

## 2020-08-07 PROCEDURE — 82962 GLUCOSE BLOOD TEST: CPT

## 2020-08-07 PROCEDURE — 70543 MRI ORBT/FAC/NCK W/O &W/DYE: CPT

## 2020-08-07 PROCEDURE — 78815 PET IMAGE W/CT SKULL-THIGH: CPT

## 2020-08-07 PROCEDURE — A9585: CPT

## 2020-08-10 ENCOUNTER — APPOINTMENT (OUTPATIENT)
Dept: VASCULAR SURGERY | Facility: CLINIC | Age: 65
End: 2020-08-10
Payer: MEDICARE

## 2020-08-10 PROCEDURE — 99024 POSTOP FOLLOW-UP VISIT: CPT

## 2020-08-10 NOTE — REASON FOR VISIT
[Home] : at home, [unfilled] , at the time of the visit. [Medical Office: ([facility])___] : at the medical office located in  [de-identified] : L radiocephalic AVF [de-identified] : 6/5/20 [de-identified] : Pt. is doing well, denies any hand pain or weakness. \par Duplex: diameter 5.7mm, VF 800ml/min\par A/P AVF is maturing well and is ready for use\par HD center notified to attempt cannulation \par if functioning well for 2w pt. will return for PC removal. Otherwise will plan BAM if difficulty with cannulation.\par \par

## 2020-08-11 ENCOUNTER — APPOINTMENT (OUTPATIENT)
Dept: ULTRASOUND IMAGING | Facility: CLINIC | Age: 65
End: 2020-08-11

## 2020-08-12 ENCOUNTER — APPOINTMENT (OUTPATIENT)
Dept: NUCLEAR MEDICINE | Facility: IMAGING CENTER | Age: 65
End: 2020-08-12

## 2020-08-12 ENCOUNTER — OUTPATIENT (OUTPATIENT)
Dept: OUTPATIENT SERVICES | Facility: HOSPITAL | Age: 65
LOS: 1 days | End: 2020-08-12
Payer: COMMERCIAL

## 2020-08-12 ENCOUNTER — RESULT REVIEW (OUTPATIENT)
Age: 65
End: 2020-08-12

## 2020-08-12 ENCOUNTER — APPOINTMENT (OUTPATIENT)
Dept: CT IMAGING | Facility: IMAGING CENTER | Age: 65
End: 2020-08-12

## 2020-08-12 DIAGNOSIS — Z90.49 ACQUIRED ABSENCE OF OTHER SPECIFIED PARTS OF DIGESTIVE TRACT: Chronic | ICD-10-CM

## 2020-08-12 DIAGNOSIS — C02.9 MALIGNANT NEOPLASM OF TONGUE, UNSPECIFIED: ICD-10-CM

## 2020-08-12 PROCEDURE — 88321 CONSLTJ&REPRT SLD PREP ELSWR: CPT

## 2020-08-13 LAB — SURGICAL PATHOLOGY STUDY: SIGNIFICANT CHANGE UP

## 2020-08-22 DIAGNOSIS — Z01.818 ENCOUNTER FOR OTHER PREPROCEDURAL EXAMINATION: ICD-10-CM

## 2020-08-24 ENCOUNTER — APPOINTMENT (OUTPATIENT)
Dept: DISASTER EMERGENCY | Facility: CLINIC | Age: 65
End: 2020-08-24

## 2020-08-26 ENCOUNTER — APPOINTMENT (OUTPATIENT)
Dept: CARDIOLOGY | Facility: CLINIC | Age: 65
End: 2020-08-26

## 2020-08-26 ENCOUNTER — APPOINTMENT (OUTPATIENT)
Dept: VASCULAR SURGERY | Facility: CLINIC | Age: 65
End: 2020-08-26
Payer: MEDICARE

## 2020-08-26 VITALS
HEART RATE: 75 BPM | HEIGHT: 70 IN | DIASTOLIC BLOOD PRESSURE: 75 MMHG | WEIGHT: 145 LBS | BODY MASS INDEX: 20.76 KG/M2 | SYSTOLIC BLOOD PRESSURE: 165 MMHG | TEMPERATURE: 98.8 F

## 2020-08-26 PROCEDURE — 36589 REMOVAL TUNNELED CV CATH: CPT | Mod: 58

## 2020-08-26 NOTE — PROCEDURE
[FreeTextEntry1] : R IJ PC removal [FreeTextEntry2] : functioning AVF [FreeTextEntry3] : Informed consent was obtained from the patient.\par R chest wall area was prepped and draped.\par 10cc of 1% Lidocaine was used to anesthetize the area around the catheter. \par subcutaneous tissues were dissected around the cuff and catheter was removed. Catheter appeared to be intact. \par Manual pressure was held over the site for 10min\par Dry dressing was applied\par Pt. tolerated procedure well.\par \par

## 2020-08-29 ENCOUNTER — APPOINTMENT (OUTPATIENT)
Dept: DISASTER EMERGENCY | Facility: CLINIC | Age: 65
End: 2020-08-29

## 2020-08-30 LAB — SARS-COV-2 N GENE NPH QL NAA+PROBE: NOT DETECTED

## 2020-08-31 ENCOUNTER — TRANSCRIPTION ENCOUNTER (OUTPATIENT)
Age: 65
End: 2020-08-31

## 2020-08-31 VITALS
WEIGHT: 149.03 LBS | TEMPERATURE: 97 F | RESPIRATION RATE: 16 BRPM | SYSTOLIC BLOOD PRESSURE: 147 MMHG | HEIGHT: 69 IN | OXYGEN SATURATION: 100 % | HEART RATE: 79 BPM | DIASTOLIC BLOOD PRESSURE: 72 MMHG

## 2020-08-31 NOTE — PATIENT PROFILE ADULT - NSPROMUTANXFEARFT_GEN_A_NUR
Anxiety    Anxiety    HLD (hyperlipidemia)    HTN (hypertension)    HTN (hypertension)    UTI (urinary tract infection) None

## 2020-09-01 ENCOUNTER — APPOINTMENT (OUTPATIENT)
Dept: OTOLARYNGOLOGY | Facility: HOSPITAL | Age: 65
End: 2020-09-01

## 2020-09-01 ENCOUNTER — RESULT REVIEW (OUTPATIENT)
Age: 65
End: 2020-09-01

## 2020-09-01 ENCOUNTER — INPATIENT (INPATIENT)
Facility: HOSPITAL | Age: 65
LOS: 16 days | Discharge: HOME | DRG: 11 | End: 2020-09-18
Attending: OTOLARYNGOLOGY | Admitting: OTOLARYNGOLOGY
Payer: MEDICARE

## 2020-09-01 DIAGNOSIS — D64.89 OTHER SPECIFIED ANEMIAS: ICD-10-CM

## 2020-09-01 DIAGNOSIS — K12.2 CELLULITIS AND ABSCESS OF MOUTH: ICD-10-CM

## 2020-09-01 DIAGNOSIS — C02.9 MALIGNANT NEOPLASM OF TONGUE, UNSPECIFIED: ICD-10-CM

## 2020-09-01 DIAGNOSIS — C77.3 SECONDARY AND UNSPECIFIED MALIGNANT NEOPLASM OF AXILLA AND UPPER LIMB LYMPH NODES: ICD-10-CM
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
## 2020-09-01 DIAGNOSIS — E11.22 TYPE 2 DIABETES MELLITUS WITH DIABETIC CHRONIC KIDNEY DISEASE: ICD-10-CM

## 2020-09-01 DIAGNOSIS — Z99.2 DEPENDENCE ON RENAL DIALYSIS: ICD-10-CM

## 2020-09-01 DIAGNOSIS — T81.83XA PERSISTENT POSTPROCEDURAL FISTULA, INITIAL ENCOUNTER: ICD-10-CM

## 2020-09-01 DIAGNOSIS — D63.1 ANEMIA IN CHRONIC KIDNEY DISEASE: ICD-10-CM

## 2020-09-01 DIAGNOSIS — N40.0 BENIGN PROSTATIC HYPERPLASIA WITHOUT LOWER URINARY TRACT SYMPTOMS: ICD-10-CM

## 2020-09-01 DIAGNOSIS — T86.828 OTHER COMPLICATIONS OF SKIN GRAFT (ALLOGRAFT) (AUTOGRAFT): ICD-10-CM

## 2020-09-01 DIAGNOSIS — N18.6 END STAGE RENAL DISEASE: ICD-10-CM

## 2020-09-01 DIAGNOSIS — E03.9 HYPOTHYROIDISM, UNSPECIFIED: ICD-10-CM

## 2020-09-01 DIAGNOSIS — R62.7 ADULT FAILURE TO THRIVE: ICD-10-CM
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
## 2020-09-01 DIAGNOSIS — Z90.49 ACQUIRED ABSENCE OF OTHER SPECIFIED PARTS OF DIGESTIVE TRACT: Chronic | ICD-10-CM

## 2020-09-01 DIAGNOSIS — I77.0 ARTERIOVENOUS FISTULA, ACQUIRED: Chronic | ICD-10-CM

## 2020-09-01 DIAGNOSIS — I12.0 HYPERTENSIVE CHRONIC KIDNEY DISEASE WITH STAGE 5 CHRONIC KIDNEY DISEASE OR END STAGE RENAL DISEASE: ICD-10-CM

## 2020-09-01 DIAGNOSIS — E78.5 HYPERLIPIDEMIA, UNSPECIFIED: ICD-10-CM

## 2020-09-01 LAB
ANION GAP SERPL CALC-SCNC: 10 MMOL/L — SIGNIFICANT CHANGE UP (ref 5–17)
APTT BLD: 37.2 SEC — HIGH (ref 27.5–35.5)
BASE EXCESS BLDA CALC-SCNC: -1.1 MMOL/L — SIGNIFICANT CHANGE UP (ref -2–3)
BASE EXCESS BLDA CALC-SCNC: -4.2 MMOL/L — LOW (ref -2–3)
BASE EXCESS BLDA CALC-SCNC: 3.1 MMOL/L — HIGH (ref -2–3)
BASE EXCESS BLDA CALC-SCNC: 4.4 MMOL/L — HIGH (ref -2–3)
BUN SERPL-MCNC: 30 MG/DL — HIGH (ref 7–23)
CA-I BLDA-SCNC: 1.09 MMOL/L — LOW (ref 1.12–1.3)
CA-I BLDA-SCNC: 1.15 MMOL/L — SIGNIFICANT CHANGE UP (ref 1.12–1.3)
CA-I BLDA-SCNC: 1.17 MMOL/L — SIGNIFICANT CHANGE UP (ref 1.12–1.3)
CALCIUM SERPL-MCNC: 8.4 MG/DL — SIGNIFICANT CHANGE UP (ref 8.4–10.5)
CHLORIDE SERPL-SCNC: 108 MMOL/L — SIGNIFICANT CHANGE UP (ref 96–108)
CO2 SERPL-SCNC: 21 MMOL/L — LOW (ref 22–31)
COHGB MFR BLDA: 0.2 % — SIGNIFICANT CHANGE UP
COHGB MFR BLDA: 0.3 % — SIGNIFICANT CHANGE UP
COHGB MFR BLDA: 1 % — SIGNIFICANT CHANGE UP
CREAT SERPL-MCNC: 4.85 MG/DL — HIGH (ref 0.5–1.3)
GLUCOSE BLDC GLUCOMTR-MCNC: 107 MG/DL — HIGH (ref 70–99)
GLUCOSE BLDC GLUCOMTR-MCNC: 122 MG/DL — HIGH (ref 70–99)
GLUCOSE BLDC GLUCOMTR-MCNC: 123 MG/DL — HIGH (ref 70–99)
GLUCOSE BLDC GLUCOMTR-MCNC: 137 MG/DL — HIGH (ref 70–99)
GLUCOSE BLDC GLUCOMTR-MCNC: 143 MG/DL — HIGH (ref 70–99)
GLUCOSE BLDC GLUCOMTR-MCNC: 164 MG/DL — HIGH (ref 70–99)
GLUCOSE BLDC GLUCOMTR-MCNC: 178 MG/DL — HIGH (ref 70–99)
GLUCOSE BLDC GLUCOMTR-MCNC: 180 MG/DL — HIGH (ref 70–99)
GLUCOSE SERPL-MCNC: 191 MG/DL — HIGH (ref 70–99)
HCO3 BLDA-SCNC: 20 MMOL/L — LOW (ref 21–28)
HCO3 BLDA-SCNC: 23 MMOL/L — SIGNIFICANT CHANGE UP (ref 21–28)
HCO3 BLDA-SCNC: 27 MMOL/L — SIGNIFICANT CHANGE UP (ref 21–28)
HCO3 BLDA-SCNC: 28 MMOL/L — SIGNIFICANT CHANGE UP (ref 21–28)
HCT VFR BLD CALC: 26.4 % — LOW (ref 39–50)
HGB BLD-MCNC: 8.6 G/DL — LOW (ref 13–17)
HGB BLDA-MCNC: 10.9 G/DL — LOW (ref 13–17)
LACTATE SERPL-SCNC: 1.1 MMOL/L — SIGNIFICANT CHANGE UP (ref 0.5–2)
MAGNESIUM SERPL-MCNC: 2.4 MG/DL — SIGNIFICANT CHANGE UP (ref 1.6–2.6)
MCHC RBC-ENTMCNC: 29.8 PG — SIGNIFICANT CHANGE UP (ref 27–34)
MCHC RBC-ENTMCNC: 32.6 GM/DL — SIGNIFICANT CHANGE UP (ref 32–36)
MCV RBC AUTO: 91.3 FL — SIGNIFICANT CHANGE UP (ref 80–100)
METHGB MFR BLDA: 0.2 % — SIGNIFICANT CHANGE UP
METHGB MFR BLDA: 0.3 % — SIGNIFICANT CHANGE UP
METHGB MFR BLDA: 0.3 % — SIGNIFICANT CHANGE UP
NRBC # BLD: 0 /100 WBCS — SIGNIFICANT CHANGE UP (ref 0–0)
O2 CT VFR BLDA CALC: 14.1 ML/DL — SIGNIFICANT CHANGE UP (ref 15–23)
O2 CT VFR BLDA CALC: 15.3 ML/DL — SIGNIFICANT CHANGE UP (ref 15–23)
O2 CT VFR BLDA CALC: 15.8 ML/DL — SIGNIFICANT CHANGE UP (ref 15–23)
OXYHGB MFR BLDA: 92 % — LOW (ref 94–100)
OXYHGB MFR BLDA: 98 % — SIGNIFICANT CHANGE UP (ref 94–100)
OXYHGB MFR BLDA: 98 % — SIGNIFICANT CHANGE UP (ref 94–100)
PCO2 BLDA: 30 MMHG — LOW (ref 35–48)
PCO2 BLDA: 36 MMHG — SIGNIFICANT CHANGE UP (ref 35–48)
PCO2 BLDA: 40 MMHG — SIGNIFICANT CHANGE UP (ref 35–48)
PCO2 BLDA: 40 MMHG — SIGNIFICANT CHANGE UP (ref 35–48)
PH BLDA: 7.42 — SIGNIFICANT CHANGE UP (ref 7.35–7.45)
PH BLDA: 7.44 — SIGNIFICANT CHANGE UP (ref 7.35–7.45)
PH BLDA: 7.46 — HIGH (ref 7.35–7.45)
PH BLDA: 7.47 — HIGH (ref 7.35–7.45)
PHOSPHATE SERPL-MCNC: 2.9 MG/DL — SIGNIFICANT CHANGE UP (ref 2.5–4.5)
PLATELET # BLD AUTO: 181 K/UL — SIGNIFICANT CHANGE UP (ref 150–400)
PO2 BLDA: 135 MMHG — HIGH (ref 83–108)
PO2 BLDA: 207 MMHG — HIGH (ref 83–108)
PO2 BLDA: 285 MMHG — HIGH (ref 83–108)
PO2 BLDA: 68 MMHG — LOW (ref 83–108)
POTASSIUM BLDA-SCNC: 4.5 MMOL/L — SIGNIFICANT CHANGE UP (ref 3.5–4.9)
POTASSIUM BLDA-SCNC: 4.6 MMOL/L — SIGNIFICANT CHANGE UP (ref 3.5–4.9)
POTASSIUM BLDA-SCNC: 4.9 MMOL/L — SIGNIFICANT CHANGE UP (ref 3.5–4.9)
POTASSIUM SERPL-MCNC: 5.5 MMOL/L — HIGH (ref 3.5–5.3)
POTASSIUM SERPL-SCNC: 5.5 MMOL/L — HIGH (ref 3.5–5.3)
RBC # BLD: 2.89 M/UL — LOW (ref 4.2–5.8)
RBC # FLD: 15.2 % — HIGH (ref 10.3–14.5)
SAO2 % BLDA: 93 % — LOW (ref 95–100)
SAO2 % BLDA: 99 % — SIGNIFICANT CHANGE UP (ref 95–100)
SODIUM BLDA-SCNC: 135 MMOL/L — LOW (ref 138–146)
SODIUM SERPL-SCNC: 139 MMOL/L — SIGNIFICANT CHANGE UP (ref 135–145)
WBC # BLD: 16.6 K/UL — HIGH (ref 3.8–10.5)
WBC # FLD AUTO: 16.6 K/UL — HIGH (ref 3.8–10.5)

## 2020-09-01 PROCEDURE — 41116 EXCISION OF MOUTH LESION: CPT | Mod: LT,GC

## 2020-09-01 PROCEDURE — 31600 PLANNED TRACHEOSTOMY: CPT | Mod: GC

## 2020-09-01 PROCEDURE — 99291 CRITICAL CARE FIRST HOUR: CPT | Mod: 25

## 2020-09-01 PROCEDURE — 73551 X-RAY EXAM OF FEMUR 1: CPT | Mod: 26,RT

## 2020-09-01 PROCEDURE — 71045 X-RAY EXAM CHEST 1 VIEW: CPT | Mod: 26

## 2020-09-01 PROCEDURE — 88331 PATH CONSLTJ SURG 1 BLK 1SPC: CPT | Mod: 26

## 2020-09-01 PROCEDURE — 41130 PARTIAL REMOVAL OF TONGUE: CPT | Mod: GC,LT

## 2020-09-01 PROCEDURE — 88305 TISSUE EXAM BY PATHOLOGIST: CPT | Mod: 26

## 2020-09-01 PROCEDURE — 88309 TISSUE EXAM BY PATHOLOGIST: CPT | Mod: 26

## 2020-09-01 PROCEDURE — 38724 REMOVAL OF LYMPH NODES NECK: CPT | Mod: GC,LT

## 2020-09-01 RX ORDER — SODIUM CHLORIDE 9 MG/ML
1000 INJECTION, SOLUTION INTRAVENOUS
Refills: 0 | Status: DISCONTINUED | OUTPATIENT
Start: 2020-09-01 | End: 2020-09-18

## 2020-09-01 RX ORDER — METRONIDAZOLE 500 MG
500 TABLET ORAL EVERY 8 HOURS
Refills: 0 | Status: COMPLETED | OUTPATIENT
Start: 2020-09-01 | End: 2020-09-08

## 2020-09-01 RX ORDER — DEXTROSE 50 % IN WATER 50 %
12.5 SYRINGE (ML) INTRAVENOUS ONCE
Refills: 0 | Status: DISCONTINUED | OUTPATIENT
Start: 2020-09-01 | End: 2020-09-18

## 2020-09-01 RX ORDER — ACETAMINOPHEN 500 MG
650 TABLET ORAL EVERY 6 HOURS
Refills: 0 | Status: DISCONTINUED | OUTPATIENT
Start: 2020-09-01 | End: 2020-09-01

## 2020-09-01 RX ORDER — LEVOTHYROXINE SODIUM 125 MCG
1 TABLET ORAL
Qty: 0 | Refills: 0 | DISCHARGE

## 2020-09-01 RX ORDER — LEVOTHYROXINE SODIUM 125 MCG
75 TABLET ORAL DAILY
Refills: 0 | Status: DISCONTINUED | OUTPATIENT
Start: 2020-09-01 | End: 2020-09-01

## 2020-09-01 RX ORDER — CHLORHEXIDINE GLUCONATE 213 G/1000ML
15 SOLUTION TOPICAL EVERY 12 HOURS
Refills: 0 | Status: DISCONTINUED | OUTPATIENT
Start: 2020-09-01 | End: 2020-09-05

## 2020-09-01 RX ORDER — CHLORHEXIDINE GLUCONATE 213 G/1000ML
1 SOLUTION TOPICAL DAILY
Refills: 0 | Status: DISCONTINUED | OUTPATIENT
Start: 2020-09-01 | End: 2020-09-18

## 2020-09-01 RX ORDER — FENTANYL CITRATE 50 UG/ML
12.5 INJECTION INTRAVENOUS
Refills: 0 | Status: DISCONTINUED | OUTPATIENT
Start: 2020-09-01 | End: 2020-09-02

## 2020-09-01 RX ORDER — DEXTROSE 50 % IN WATER 50 %
15 SYRINGE (ML) INTRAVENOUS ONCE
Refills: 0 | Status: DISCONTINUED | OUTPATIENT
Start: 2020-09-01 | End: 2020-09-18

## 2020-09-01 RX ORDER — INSULIN LISPRO 100/ML
VIAL (ML) SUBCUTANEOUS
Refills: 0 | Status: DISCONTINUED | OUTPATIENT
Start: 2020-09-01 | End: 2020-09-03

## 2020-09-01 RX ORDER — HYDRALAZINE HCL 50 MG
1 TABLET ORAL
Qty: 0 | Refills: 0 | DISCHARGE

## 2020-09-01 RX ORDER — PROPOFOL 10 MG/ML
12.33 INJECTION, EMULSION INTRAVENOUS
Qty: 1000 | Refills: 0 | Status: DISCONTINUED | OUTPATIENT
Start: 2020-09-01 | End: 2020-09-01

## 2020-09-01 RX ORDER — FENTANYL CITRATE 50 UG/ML
0.5 INJECTION INTRAVENOUS
Qty: 5000 | Refills: 0 | Status: DISCONTINUED | OUTPATIENT
Start: 2020-09-01 | End: 2020-09-01

## 2020-09-01 RX ORDER — LEVOTHYROXINE SODIUM 125 MCG
60 TABLET ORAL
Refills: 0 | Status: DISCONTINUED | OUTPATIENT
Start: 2020-09-01 | End: 2020-09-03

## 2020-09-01 RX ORDER — PANTOPRAZOLE SODIUM 20 MG/1
40 TABLET, DELAYED RELEASE ORAL DAILY
Refills: 0 | Status: DISCONTINUED | OUTPATIENT
Start: 2020-09-01 | End: 2020-09-18

## 2020-09-01 RX ORDER — SITAGLIPTIN 50 MG/1
1 TABLET, FILM COATED ORAL
Qty: 0 | Refills: 0 | DISCHARGE

## 2020-09-01 RX ORDER — SODIUM CHLORIDE 9 MG/ML
1000 INJECTION INTRAMUSCULAR; INTRAVENOUS; SUBCUTANEOUS
Refills: 0 | Status: DISCONTINUED | OUTPATIENT
Start: 2020-09-01 | End: 2020-09-01

## 2020-09-01 RX ORDER — DEXTROSE 50 % IN WATER 50 %
25 SYRINGE (ML) INTRAVENOUS ONCE
Refills: 0 | Status: DISCONTINUED | OUTPATIENT
Start: 2020-09-01 | End: 2020-09-18

## 2020-09-01 RX ORDER — CARVEDILOL PHOSPHATE 80 MG/1
25 CAPSULE, EXTENDED RELEASE ORAL EVERY 12 HOURS
Refills: 0 | Status: DISCONTINUED | OUTPATIENT
Start: 2020-09-01 | End: 2020-09-01

## 2020-09-01 RX ORDER — ATORVASTATIN CALCIUM 80 MG/1
1 TABLET, FILM COATED ORAL
Qty: 0 | Refills: 0 | DISCHARGE

## 2020-09-01 RX ORDER — FUROSEMIDE 40 MG
1 TABLET ORAL
Qty: 0 | Refills: 0 | DISCHARGE

## 2020-09-01 RX ORDER — AMLODIPINE BESYLATE 2.5 MG/1
10 TABLET ORAL DAILY
Refills: 0 | Status: DISCONTINUED | OUTPATIENT
Start: 2020-09-01 | End: 2020-09-01

## 2020-09-01 RX ORDER — SODIUM CHLORIDE 9 MG/ML
1000 INJECTION INTRAMUSCULAR; INTRAVENOUS; SUBCUTANEOUS ONCE
Refills: 0 | Status: COMPLETED | OUTPATIENT
Start: 2020-09-01 | End: 2020-09-01

## 2020-09-01 RX ORDER — ASPIRIN/CALCIUM CARB/MAGNESIUM 324 MG
300 TABLET ORAL ONCE
Refills: 0 | Status: COMPLETED | OUTPATIENT
Start: 2020-09-01 | End: 2020-09-02

## 2020-09-01 RX ORDER — GLUCAGON INJECTION, SOLUTION 0.5 MG/.1ML
1 INJECTION, SOLUTION SUBCUTANEOUS ONCE
Refills: 0 | Status: DISCONTINUED | OUTPATIENT
Start: 2020-09-01 | End: 2020-09-18

## 2020-09-01 RX ORDER — HEPARIN SODIUM 5000 [USP'U]/ML
5000 INJECTION INTRAVENOUS; SUBCUTANEOUS EVERY 8 HOURS
Refills: 0 | Status: DISCONTINUED | OUTPATIENT
Start: 2020-09-02 | End: 2020-09-18

## 2020-09-01 RX ORDER — NOREPINEPHRINE BITARTRATE/D5W 8 MG/250ML
0.05 PLASTIC BAG, INJECTION (ML) INTRAVENOUS
Qty: 8 | Refills: 0 | Status: DISCONTINUED | OUTPATIENT
Start: 2020-09-01 | End: 2020-09-01

## 2020-09-01 RX ORDER — FENTANYL CITRATE 50 UG/ML
25 INJECTION INTRAVENOUS
Refills: 0 | Status: DISCONTINUED | OUTPATIENT
Start: 2020-09-01 | End: 2020-09-02

## 2020-09-01 RX ORDER — TAMSULOSIN HYDROCHLORIDE 0.4 MG/1
1 CAPSULE ORAL
Qty: 0 | Refills: 0 | DISCHARGE

## 2020-09-01 RX ORDER — CEFAZOLIN SODIUM 1 G
1000 VIAL (EA) INJECTION EVERY 8 HOURS
Refills: 0 | Status: DISCONTINUED | OUTPATIENT
Start: 2020-09-01 | End: 2020-09-03

## 2020-09-01 RX ORDER — METRONIDAZOLE 500 MG
TABLET ORAL
Refills: 0 | Status: DISCONTINUED | OUTPATIENT
Start: 2020-09-01 | End: 2020-09-01

## 2020-09-01 RX ORDER — ALBUMIN HUMAN 25 %
250 VIAL (ML) INTRAVENOUS ONCE
Refills: 0 | Status: DISCONTINUED | OUTPATIENT
Start: 2020-09-01 | End: 2020-09-02

## 2020-09-01 RX ORDER — CARVEDILOL PHOSPHATE 80 MG/1
1 CAPSULE, EXTENDED RELEASE ORAL
Qty: 0 | Refills: 0 | DISCHARGE

## 2020-09-01 RX ORDER — CEFAZOLIN SODIUM 1 G
1000 VIAL (EA) INJECTION EVERY 8 HOURS
Refills: 0 | Status: DISCONTINUED | OUTPATIENT
Start: 2020-09-01 | End: 2020-09-01

## 2020-09-01 RX ORDER — AMLODIPINE BESYLATE 2.5 MG/1
1 TABLET ORAL
Qty: 0 | Refills: 0 | DISCHARGE

## 2020-09-01 RX ADMIN — FENTANYL CITRATE 1.69 MICROGRAM(S)/KG/HR: 50 INJECTION INTRAVENOUS at 22:34

## 2020-09-01 RX ADMIN — Medication 2: at 22:26

## 2020-09-01 RX ADMIN — Medication 100 MILLIGRAM(S): at 21:50

## 2020-09-01 RX ADMIN — Medication 100 MILLIGRAM(S): at 22:31

## 2020-09-01 RX ADMIN — SODIUM CHLORIDE 1000 MILLILITER(S): 9 INJECTION INTRAMUSCULAR; INTRAVENOUS; SUBCUTANEOUS at 20:46

## 2020-09-01 NOTE — H&P ADULT - ASSESSMENT
A/P: 64M s/p left hemiglossectomy, left modified radical neck dissection, right ALT free flap reconstruction, tracheotomy.    - Transferred to SICU level care.   - Pain control per unit protocol   - head in neutral position with slight turn to right on donut   - q1hr nursing flap checks with pen doppler and implantable venous doppler, q3hr resident flap checks  - ASA 300mg VT tonight  - MAP goal of 65+   - routine trach care: q1hr suctioning, daily cleaning of inner cannula, humidification    - Likely dialysis in AM   - right Ivana  - Andres in place  - f/u abdominal xray to confirm placement of NGT prior to use.    - PEBBLES drain care  - Ancef and flagyl  - SCDs  - Any questions or concerns please page ENT

## 2020-09-01 NOTE — CONSULT NOTE ADULT - CONSULT REASON
65 y/o M with  ESRD on HD last HD yesterday, HTN, DM, BPH and scc of L tongue. Now S/p  left hemiglossectomy with rt ALT ff to left facial art and vein and  Trach placement

## 2020-09-01 NOTE — CONSULT NOTE ADULT - ATTENDING COMMENTS
ESRD sp HD prior to surgery  hypotension post op which responded to crystalloid and colloid IVF resuscitation with transient requirement for pressor  no evidence fluid overload with clear cxr  weaning sedation and analgesia iv drip meds for plan to trial spontaneous ventilation in am

## 2020-09-01 NOTE — H&P ADULT - NSHPPHYSICALEXAM_GEN_ALL_CORE
Gen: NAD, sedated and laying in bed  Face: NCAT   Nose: Dobhoff feeding tube in left nare sutured to septum   OC/OP: MMM, flap recon of left tongue with native right tongue, incisions clean and intact.   Neck: incisions C/D/I, left neck PEBBLES X1, implantable venous flow doppler wire in place with gauze and tegaderm.  8 cuffed trach in place, sutured to skin, cuff up.    Resp: trach to vent  : Andres in place.   extremities: right thigh incision C/D/I, PEBBLES drain X 2

## 2020-09-01 NOTE — H&P ADULT - NSICDXPASTMEDICALHX_GEN_ALL_CORE_FT
PAST MEDICAL HISTORY:  BPH (benign prostatic hyperplasia)     Chronic kidney disease (CKD), stage V Hemodialysis    Essential hypertension     Hyperlipidemia     Hypothyroidism     Lower extremity edema     Type II diabetes mellitus

## 2020-09-01 NOTE — CONSULT NOTE ADULT - SUBJECTIVE AND OBJECTIVE BOX
HPI:  64M w/ pmhx of ESRD, HTN, DM and T3N1M0 left tongue SCCa now s/p left hemiglossectomy, left modified radical neck dissection, right ALT free flap and tracheotomy.  Pt tolerated procedure well and was transferred to the SICU in stable condition. (01 Sep 2020 20:22)      PAST MEDICAL & SURGICAL HISTORY:  Hypothyroidism  Lower extremity edema  Hyperlipidemia  BPH (benign prostatic hyperplasia)  Type II diabetes mellitus  Essential hypertension  Chronic kidney disease (CKD), stage V: Hemodialysis  AV fistula: left UE  S/P appendectomy      ROS: See HPI    MEDICATIONS  (STANDING):  aspirin Suppository 300 milliGRAM(s) Rectal once  ceFAZolin   IVPB 1000 milliGRAM(s) IV Intermittent every 8 hours  chlorhexidine 0.12% Liquid 15 milliLiter(s) Oral Mucosa every 12 hours  chlorhexidine 2% Cloths 1 Application(s) Topical daily  fentaNYL   Infusion... 0.5 MICROgram(s)/kG/Hr (1.69 mL/Hr) IV Continuous <Continuous>  levothyroxine Injectable 60 MICROGram(s) IV Push <User Schedule>  metroNIDAZOLE  IVPB      norepinephrine Infusion 0.05 MICROgram(s)/kG/Min (6.34 mL/Hr) IV Continuous <Continuous>  propofol Infusion 12.327 MICROgram(s)/kG/Min (5 mL/Hr) IV Continuous <Continuous>  sodium chloride 0.9% Bolus 1000 milliLiter(s) IV Bolus once  sodium chloride 0.9%. 1000 milliLiter(s) (100 mL/Hr) IV Continuous <Continuous>    MEDICATIONS  (PRN):  acetaminophen   Tablet .. 650 milliGRAM(s) Oral every 6 hours PRN Mild Pain (1 - 3)      Allergies    No Known Allergies    Intolerances        SOCIAL HISTORY:  Smoke: Never Smoker  EtOH: occasional    FAMILY HISTORY:      Vital Signs Last 24 Hrs  T(C): 35.7 (01 Sep 2020 19:45), Max: 35.7 (01 Sep 2020 19:45)  T(F): 96.3 (01 Sep 2020 19:45), Max: 96.3 (01 Sep 2020 19:45)  HR: 88 (01 Sep 2020 19:54) (86 - 88)  BP: 114/64 (01 Sep 2020 19:54) (114/64 - 131/69)  BP(mean): 83 (01 Sep 2020 19:54) (83 - 93)  RR: 12 (01 Sep 2020 19:54) (12 - 12)  SpO2: 100% (01 Sep 2020 19:54) (100% - 100%)    PHYSICAL EXAM    Gen: NAD   Neuro: Sedated and trached moving ext to pain   HEENT: Trach site c/d/ Good airway movement + cuff inflated. +Left neck incisions c/d/i +External doppler @prolene stitch  with good flow + Internal doppler with good flow as well PEBBLES ss  CV: RRR reg s1s2 no M  Pulm: CTA B/L no w/w/r  Abd: Soft, NT/ND  Ext: No C/C/E + Left Leg PEBBLES x2 ss incision c/d/i   Skin: No rashes erythema or ecchymosis  MSK: No joint swelling noted  Psych: Unable to assess    LABS:                        8.6    16.60 )-----------( 181      ( 01 Sep 2020 20:13 )             26.4     09-01    139  |  108  |  30<H>  ----------------------------<  191<H>  5.5<H>   |  21<L>  |  4.85<H>    Ca    8.4      01 Sep 2020 20:13  Phos  2.9     09-01  Mg     2.4     09-01      PTT - ( 01 Sep 2020 20:13 )  PTT:37.2 sec      RADIOLOGY & ADDITIONAL STUDIES:    Assessment and Plan:  64M w/  left tongue SCC now s/p left hemiglossectomy, left modified radical neck dissection, right ALT free flap and tracheotomy.  Pt tolerated procedure well and was transferred to the SICU in stable condition.    Neuro: Propofol fentanyl  HEENT: Neutral to slightly right neck position Flap checks q1  NE,   CV: HD stable LR@100 Currently on Levophed - will bolus 1L in order to wean off pressors.   Pulm: Trached 8 Cuffed CMV 40/550/12/5   GI: NPO Doboff sutured f/u Xray.   : Andres ESRD but makes Urine HD yesterday   ID: Ancef( 9/1-) Flagyl( 9/1 - )   Endo: ISS  ppx: SCD  SQH in am  Lines: PIV Ivana( 9/1-)   Wounds: PEBBLES x2 in rt thigh  Left Neck PEBBLES  PT/OT: Not Ordered HPI:  64M w/ pmhx of ESRD, HTN, DM and T3N1M0 left tongue SCCa now s/p left hemiglossectomy, left modified radical neck dissection, right ALT free flap and tracheotomy.  Pt tolerated procedure well and was transferred to the SICU in stable condition. (01 Sep 2020 20:22)      PAST MEDICAL & SURGICAL HISTORY:  Hypothyroidism  Lower extremity edema  Hyperlipidemia  BPH (benign prostatic hyperplasia)  Type II diabetes mellitus  Essential hypertension  Chronic kidney disease (CKD), stage V: Hemodialysis  AV fistula: left UE  S/P appendectomy      ROS: See HPI    MEDICATIONS  (STANDING):  aspirin Suppository 300 milliGRAM(s) Rectal once  ceFAZolin   IVPB 1000 milliGRAM(s) IV Intermittent every 8 hours  chlorhexidine 0.12% Liquid 15 milliLiter(s) Oral Mucosa every 12 hours  chlorhexidine 2% Cloths 1 Application(s) Topical daily  fentaNYL   Infusion... 0.5 MICROgram(s)/kG/Hr (1.69 mL/Hr) IV Continuous <Continuous>  levothyroxine Injectable 60 MICROGram(s) IV Push <User Schedule>  metroNIDAZOLE  IVPB      norepinephrine Infusion 0.05 MICROgram(s)/kG/Min (6.34 mL/Hr) IV Continuous <Continuous>  propofol Infusion 12.327 MICROgram(s)/kG/Min (5 mL/Hr) IV Continuous <Continuous>  sodium chloride 0.9% Bolus 1000 milliLiter(s) IV Bolus once  sodium chloride 0.9%. 1000 milliLiter(s) (100 mL/Hr) IV Continuous <Continuous>    MEDICATIONS  (PRN):  acetaminophen   Tablet .. 650 milliGRAM(s) Oral every 6 hours PRN Mild Pain (1 - 3)      Allergies    No Known Allergies    Intolerances        SOCIAL HISTORY:  Smoke: Never Smoker  EtOH: occasional    FAMILY HISTORY:      Vital Signs Last 24 Hrs  T(C): 35.7 (01 Sep 2020 19:45), Max: 35.7 (01 Sep 2020 19:45)  T(F): 96.3 (01 Sep 2020 19:45), Max: 96.3 (01 Sep 2020 19:45)  HR: 88 (01 Sep 2020 19:54) (86 - 88)  BP: 114/64 (01 Sep 2020 19:54) (114/64 - 131/69)  BP(mean): 83 (01 Sep 2020 19:54) (83 - 93)  RR: 12 (01 Sep 2020 19:54) (12 - 12)  SpO2: 100% (01 Sep 2020 19:54) (100% - 100%)    PHYSICAL EXAM    Gen: NAD   Neuro: Sedated and trached moving ext to pain   HEENT: Trach site c/d/ Good airway movement + cuff inflated. +Left neck incisions c/d/i +External doppler @prolene stitch  with good flow + Internal doppler with good flow as well PEBBLES ss  CV: RRR reg s1s2 no M  Pulm: CTA B/L no w/w/r  Abd: Soft, NT/ND  Ext: No C/C/E + Left Leg PEBBLES x2 ss incision c/d/i   Skin: No rashes erythema or ecchymosis  MSK: No joint swelling noted  Psych: Unable to assess    LABS:                        8.6    16.60 )-----------( 181      ( 01 Sep 2020 20:13 )             26.4     09-01    139  |  108  |  30<H>  ----------------------------<  191<H>  5.5<H>   |  21<L>  |  4.85<H>    Ca    8.4      01 Sep 2020 20:13  Phos  2.9     09-01  Mg     2.4     09-01      PTT - ( 01 Sep 2020 20:13 )  PTT:37.2 sec      RADIOLOGY & ADDITIONAL STUDIES:    Assessment and Plan:  64M w/  left tongue SCC now s/p left hemiglossectomy, left modified radical neck dissection, right ALT free flap and tracheotomy.  Pt tolerated procedure well and was transferred to the SICU in stable condition.    Neuro: Propofol fentanyl   HEENT: Neutral to slightly right neck position Flap checks q1  CA,   CV: HD stable NS@100 Currently on Levophed - will bolus 1L in order to wean off pressors.   Pulm: Trached 8 Cuffed CMV 40/500/12/5   GI: NPO Doboff sutured f/u Xray. Protonix Chlorhex  : Andres ESRD but makes Urine HD yesterday   ID: Ancef( 9/1-) Flagyl( 9/1 - )   Endo: ISS Levothyroxine  ppx: SCD  SQH in am  Lines: PIV Ivana( 9/1-)   Wounds: PEBBLES x2 in rt thigh  Left Neck PEBBLES  PT/OT: Not Ordered

## 2020-09-01 NOTE — H&P ADULT - HISTORY OF PRESENT ILLNESS
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  Pt tolerated procedure well and was transferred to the SICU in stable condition.

## 2020-09-02 LAB
A1C WITH ESTIMATED AVERAGE GLUCOSE RESULT: 5.9 % — HIGH (ref 4–5.6)
ANION GAP SERPL CALC-SCNC: 13 MMOL/L — SIGNIFICANT CHANGE UP (ref 5–17)
BASE EXCESS BLDA CALC-SCNC: -2.8 MMOL/L — LOW (ref -2–3)
BUN SERPL-MCNC: 39 MG/DL — HIGH (ref 7–23)
CA-I BLDA-SCNC: 1.09 MMOL/L — LOW (ref 1.12–1.3)
CALCIUM SERPL-MCNC: 8.3 MG/DL — LOW (ref 8.4–10.5)
CHLORIDE SERPL-SCNC: 109 MMOL/L — HIGH (ref 96–108)
CO2 SERPL-SCNC: 18 MMOL/L — LOW (ref 22–31)
COHGB MFR BLDA: 0.2 % — SIGNIFICANT CHANGE UP
CREAT SERPL-MCNC: 5.81 MG/DL — HIGH (ref 0.5–1.3)
ESTIMATED AVERAGE GLUCOSE: 123 MG/DL — HIGH (ref 68–114)
GLUCOSE BLDC GLUCOMTR-MCNC: 130 MG/DL — HIGH (ref 70–99)
GLUCOSE BLDC GLUCOMTR-MCNC: 137 MG/DL — HIGH (ref 70–99)
GLUCOSE BLDC GLUCOMTR-MCNC: 154 MG/DL — HIGH (ref 70–99)
GLUCOSE BLDC GLUCOMTR-MCNC: 221 MG/DL — HIGH (ref 70–99)
GLUCOSE SERPL-MCNC: 171 MG/DL — HIGH (ref 70–99)
HBV CORE AB SER-ACNC: SIGNIFICANT CHANGE UP
HBV SURFACE AB SER-ACNC: SIGNIFICANT CHANGE UP
HBV SURFACE AG SER-ACNC: SIGNIFICANT CHANGE UP
HCO3 BLDA-SCNC: 20 MMOL/L — LOW (ref 21–28)
HCT VFR BLD CALC: 27 % — LOW (ref 39–50)
HCV AB S/CO SERPL IA: 0.07 S/CO — SIGNIFICANT CHANGE UP
HCV AB SERPL-IMP: SIGNIFICANT CHANGE UP
HGB BLD-MCNC: 8.8 G/DL — LOW (ref 13–17)
HGB BLDA-MCNC: 9.3 G/DL — LOW (ref 13–17)
MAGNESIUM SERPL-MCNC: 2.5 MG/DL — SIGNIFICANT CHANGE UP (ref 1.6–2.6)
MCHC RBC-ENTMCNC: 29.6 PG — SIGNIFICANT CHANGE UP (ref 27–34)
MCHC RBC-ENTMCNC: 32.6 GM/DL — SIGNIFICANT CHANGE UP (ref 32–36)
MCV RBC AUTO: 90.9 FL — SIGNIFICANT CHANGE UP (ref 80–100)
METHGB MFR BLDA: 0.6 % — SIGNIFICANT CHANGE UP
NRBC # BLD: 0 /100 WBCS — SIGNIFICANT CHANGE UP (ref 0–0)
O2 CT VFR BLDA CALC: 13.9 ML/DL — SIGNIFICANT CHANGE UP (ref 15–23)
OXYHGB MFR BLDA: 98 % — SIGNIFICANT CHANGE UP (ref 94–100)
PCO2 BLDA: 30 MMHG — LOW (ref 35–48)
PH BLDA: 7.45 — SIGNIFICANT CHANGE UP (ref 7.35–7.45)
PHOSPHATE SERPL-MCNC: 5 MG/DL — HIGH (ref 2.5–4.5)
PLATELET # BLD AUTO: 152 K/UL — SIGNIFICANT CHANGE UP (ref 150–400)
PO2 BLDA: 390 MMHG — HIGH (ref 83–108)
POTASSIUM BLDA-SCNC: 4.4 MMOL/L — SIGNIFICANT CHANGE UP (ref 3.5–4.9)
POTASSIUM SERPL-MCNC: 5.8 MMOL/L — HIGH (ref 3.5–5.3)
POTASSIUM SERPL-SCNC: 5.8 MMOL/L — HIGH (ref 3.5–5.3)
RBC # BLD: 2.97 M/UL — LOW (ref 4.2–5.8)
RBC # FLD: 16 % — HIGH (ref 10.3–14.5)
SAO2 % BLDA: 99 % — SIGNIFICANT CHANGE UP (ref 95–100)
SODIUM BLDA-SCNC: 133 MMOL/L — LOW (ref 138–146)
SODIUM SERPL-SCNC: 140 MMOL/L — SIGNIFICANT CHANGE UP (ref 135–145)
WBC # BLD: 13.02 K/UL — HIGH (ref 3.8–10.5)
WBC # FLD AUTO: 13.02 K/UL — HIGH (ref 3.8–10.5)

## 2020-09-02 PROCEDURE — 99291 CRITICAL CARE FIRST HOUR: CPT

## 2020-09-02 PROCEDURE — 99223 1ST HOSP IP/OBS HIGH 75: CPT

## 2020-09-02 RX ORDER — ASPIRIN/CALCIUM CARB/MAGNESIUM 324 MG
81 TABLET ORAL DAILY
Refills: 0 | Status: DISCONTINUED | OUTPATIENT
Start: 2020-09-02 | End: 2020-09-09

## 2020-09-02 RX ORDER — CARVEDILOL PHOSPHATE 80 MG/1
12.5 CAPSULE, EXTENDED RELEASE ORAL EVERY 12 HOURS
Refills: 0 | Status: DISCONTINUED | OUTPATIENT
Start: 2020-09-02 | End: 2020-09-03

## 2020-09-02 RX ORDER — CARVEDILOL PHOSPHATE 80 MG/1
12.5 CAPSULE, EXTENDED RELEASE ORAL EVERY 12 HOURS
Refills: 0 | Status: DISCONTINUED | OUTPATIENT
Start: 2020-09-02 | End: 2020-09-02

## 2020-09-02 RX ORDER — HYDROMORPHONE HYDROCHLORIDE 2 MG/ML
1 INJECTION INTRAMUSCULAR; INTRAVENOUS; SUBCUTANEOUS EVERY 4 HOURS
Refills: 0 | Status: DISCONTINUED | OUTPATIENT
Start: 2020-09-02 | End: 2020-09-09

## 2020-09-02 RX ORDER — HYDROMORPHONE HYDROCHLORIDE 2 MG/ML
0.5 INJECTION INTRAMUSCULAR; INTRAVENOUS; SUBCUTANEOUS EVERY 4 HOURS
Refills: 0 | Status: DISCONTINUED | OUTPATIENT
Start: 2020-09-02 | End: 2020-09-09

## 2020-09-02 RX ADMIN — FENTANYL CITRATE 25 MICROGRAM(S): 50 INJECTION INTRAVENOUS at 09:45

## 2020-09-02 RX ADMIN — Medication 81 MILLIGRAM(S): at 12:05

## 2020-09-02 RX ADMIN — FENTANYL CITRATE 25 MICROGRAM(S): 50 INJECTION INTRAVENOUS at 13:00

## 2020-09-02 RX ADMIN — HYDROMORPHONE HYDROCHLORIDE 0.5 MILLIGRAM(S): 2 INJECTION INTRAMUSCULAR; INTRAVENOUS; SUBCUTANEOUS at 20:55

## 2020-09-02 RX ADMIN — CHLORHEXIDINE GLUCONATE 15 MILLILITER(S): 213 SOLUTION TOPICAL at 06:06

## 2020-09-02 RX ADMIN — HYDROMORPHONE HYDROCHLORIDE 0.5 MILLIGRAM(S): 2 INJECTION INTRAMUSCULAR; INTRAVENOUS; SUBCUTANEOUS at 14:41

## 2020-09-02 RX ADMIN — Medication 300 MILLIGRAM(S): at 00:55

## 2020-09-02 RX ADMIN — Medication 100 MILLIGRAM(S): at 14:41

## 2020-09-02 RX ADMIN — Medication 100 MILLIGRAM(S): at 21:46

## 2020-09-02 RX ADMIN — HEPARIN SODIUM 5000 UNIT(S): 5000 INJECTION INTRAVENOUS; SUBCUTANEOUS at 16:08

## 2020-09-02 RX ADMIN — HYDROMORPHONE HYDROCHLORIDE 0.5 MILLIGRAM(S): 2 INJECTION INTRAMUSCULAR; INTRAVENOUS; SUBCUTANEOUS at 15:00

## 2020-09-02 RX ADMIN — Medication 100 MILLIGRAM(S): at 07:02

## 2020-09-02 RX ADMIN — FENTANYL CITRATE 25 MICROGRAM(S): 50 INJECTION INTRAVENOUS at 09:11

## 2020-09-02 RX ADMIN — Medication 4: at 21:45

## 2020-09-02 RX ADMIN — HYDROMORPHONE HYDROCHLORIDE 1 MILLIGRAM(S): 2 INJECTION INTRAMUSCULAR; INTRAVENOUS; SUBCUTANEOUS at 17:20

## 2020-09-02 RX ADMIN — CHLORHEXIDINE GLUCONATE 1 APPLICATION(S): 213 SOLUTION TOPICAL at 14:41

## 2020-09-02 RX ADMIN — CHLORHEXIDINE GLUCONATE 15 MILLILITER(S): 213 SOLUTION TOPICAL at 16:09

## 2020-09-02 RX ADMIN — Medication 100 MILLIGRAM(S): at 22:57

## 2020-09-02 RX ADMIN — PANTOPRAZOLE SODIUM 40 MILLIGRAM(S): 20 TABLET, DELAYED RELEASE ORAL at 12:05

## 2020-09-02 RX ADMIN — HEPARIN SODIUM 5000 UNIT(S): 5000 INJECTION INTRAVENOUS; SUBCUTANEOUS at 07:04

## 2020-09-02 RX ADMIN — FENTANYL CITRATE 12.5 MICROGRAM(S): 50 INJECTION INTRAVENOUS at 06:06

## 2020-09-02 RX ADMIN — FENTANYL CITRATE 12.5 MICROGRAM(S): 50 INJECTION INTRAVENOUS at 06:30

## 2020-09-02 RX ADMIN — Medication 100 MILLIGRAM(S): at 06:05

## 2020-09-02 RX ADMIN — CARVEDILOL PHOSPHATE 12.5 MILLIGRAM(S): 80 CAPSULE, EXTENDED RELEASE ORAL at 20:20

## 2020-09-02 RX ADMIN — Medication 2: at 12:04

## 2020-09-02 RX ADMIN — Medication 60 MICROGRAM(S): at 07:02

## 2020-09-02 RX ADMIN — HYDROMORPHONE HYDROCHLORIDE 0.5 MILLIGRAM(S): 2 INJECTION INTRAMUSCULAR; INTRAVENOUS; SUBCUTANEOUS at 20:41

## 2020-09-02 RX ADMIN — Medication 100 MILLIGRAM(S): at 13:48

## 2020-09-02 RX ADMIN — FENTANYL CITRATE 25 MICROGRAM(S): 50 INJECTION INTRAVENOUS at 12:18

## 2020-09-02 RX ADMIN — HYDROMORPHONE HYDROCHLORIDE 1 MILLIGRAM(S): 2 INJECTION INTRAMUSCULAR; INTRAVENOUS; SUBCUTANEOUS at 17:30

## 2020-09-02 NOTE — PROGRESS NOTE ADULT - SUBJECTIVE AND OBJECTIVE BOX
Patient was seen and evaluated on dialysis.   HR: 105 (09-02-20 @ 11:00)  BP: 118/58 (09-02-20 @ 11:00)  Wt(kg): 74.2    09-02    140  |  109<H>  |  39<H>  ----------------------------<  171<H>  5.8<H>   |  18<L>  |  5.81<H>    Ca    8.3<L>      02 Sep 2020 06:12  Phos  5.0     09-02  Mg     2.5     09-02      Continue dialysis:   Dialyzer:   180    QB: 400, able to get 300 (new fistula using 16G)  K bath: 2  Goal UF:   2.0    L   over       210        min  Patient is not tolerating the procedure well due to hypotension.  UF lowered to 1.0 L.  Continue full treatment as prescribed.

## 2020-09-02 NOTE — PROGRESS NOTE ADULT - ATTENDING COMMENTS
ELTON Sheridan has acted as my scribe. Tolerated CPAP trial and now on trach collar. Start enteral feeds with Nepro. Gently resume Coreg at half the dose.

## 2020-09-02 NOTE — PROGRESS NOTE ADULT - ATTENDING COMMENTS
ESRD dialyzed today with 2L UF goal. Advise please decreasing cefazolin for ESRD dosing, 1g IV daily to be given AFTER dialysis on dialysis days as it is up to 50% dialyzable.  Agree with 1/2 dose coreg with holding parameters. didn't get coreg this morning and he's tachycardic. If bps low, can decrease dose by another half. ESRD dialyzed today with 2L UF goal however was hypotensive and tachycardic (low BP despite coreg being lowered and held this morning).  Advise please decreasing cefazolin for ESRD dosing, 1g IV daily to be given AFTER dialysis on dialysis days as it is up to 50% dialyzable.  Agree with 1/2 dose coreg with holding parameters. didn't get coreg this morning and he's tachycardic. If bps remain low, can decrease dose by another half.

## 2020-09-02 NOTE — CONSULT NOTE ADULT - ASSESSMENT
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 Nephrology consulted for dialysis arrangement.      # ESRD on HD TTS via LUE AVF   - volume status and electrolytes noted, getting HD today for hyperkalemia and volume optimization   - last HD on 8/31, regular Rx 3.5hr w 2.0 L UF   - EDW TBD  - obtain flow sheets from outpatient unit  - daily weights  - renal diet  - strict I/O    # HTN  - Resume home meds  - UF with HD  - hold anti-HTN on dialysis day     # Renal bone disease  - send phos/pth/vit D    # Anemia  - Hb 8.8 g/dl   - send iron panel  - transfusion as per primary team 63 yo M w/ pmhx of ESRD, HTN, DM and T3N1M0 left tongue SCCa now s/p left hemiglossectomy, left modified radical neck dissection, right ALT free flap and tracheotomy. Nephrology consulted for dialysis arrangement.      # ESRD on HD TTS via LUE AVF   - volume status and electrolytes noted, getting HD today for hyperkalemia and volume optimization   - last HD on 8/31, regular Rx 3.5hr w 2.0 L UF   - EDW TBD  - obtain flow sheets from outpatient unit  - daily weights  - renal diet  - strict I/O  - please remove Andres     # HTN  - Resume home meds  - UF with HD  - hold anti-HTN on dialysis day     # Renal bone disease  - send phos/pth/vit D    # Anemia  - Hb 8.8 g/dl   - send iron panel  - transfusion as per primary team

## 2020-09-02 NOTE — PROGRESS NOTE ADULT - ASSESSMENT
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      Neuro:  dilaudid PRN.   HEENT: Neutral to slightly right neck position Flap checks q1h, ASA 81 qd.   CV: hemodynamically stable. restart lower dose of coreg (12.5bid)    Pulm: new Trach 8 Cuff up, on trach collar. no resp distress.   GI: NPO Doboff, start TF nepro. Protonix,   : ESRD on HD. HD today. does makes a little urine.   ID: ppx: Ancef(9/1-) Flagyl(9/1 - )   Endo: ISS, home Levothyroxine  ppx: SCD  SQH  Lines: PIV Ivana( 9/1-)   Wounds: PEBBLES x2 in rt thigh  Left Neck PEBBLES  PT/OT: ordered. can OOB to chair per ENT

## 2020-09-02 NOTE — CONSULT NOTE ADULT - SUBJECTIVE AND OBJECTIVE BOX
Patient is a 64y old  Male who presents with a chief complaint of Post surgical care (02 Sep 2020 07:58)    Nephrology consulted for ESRD on HD.  ESRD on HD for 3mo via LUE AVF, TTS  last HD on 8/31, regular Rx 3.5hr w 2.0 L UF   admitted for surgical mngm left tongue scc  am K 5.8, no EKG changes  trach in place, FiO2 40%, stable requir, NAD    HPI:  64M w/ pmhx of ESRD, HTN, DM and T3N1M0 left tongue SCCa now s/p left hemiglossectomy, left modified radical neck dissection, right ALT free flap and tracheotomy. Pt tolerated procedure well and was transferred to the SICU in stable condition. (01 Sep 2020 20:22)      PAST MEDICAL & SURGICAL HISTORY:  Hypothyroidism  Lower extremity edema  Hyperlipidemia  BPH (benign prostatic hyperplasia)  Type II diabetes mellitus  Essential hypertension  Chronic kidney disease (CKD), stage V: Hemodialysis  AV fistula: left UE  S/P appendectomy      Allergies    No Known Allergies    Intolerances        FAMILY HISTORY:  not contributory    SOCIAL HISTORY:  denies tobacco    MEDICATIONS  (STANDING):  albumin human  5% IVPB 250 milliLiter(s) IV Intermittent once  ceFAZolin   IVPB 1000 milliGRAM(s) IV Intermittent every 8 hours  chlorhexidine 0.12% Liquid 15 milliLiter(s) Oral Mucosa every 12 hours  chlorhexidine 2% Cloths 1 Application(s) Topical daily  dextrose 5%. 1000 milliLiter(s) (50 mL/Hr) IV Continuous <Continuous>  dextrose 50% Injectable 12.5 Gram(s) IV Push once  dextrose 50% Injectable 25 Gram(s) IV Push once  dextrose 50% Injectable 25 Gram(s) IV Push once  heparin   Injectable 5000 Unit(s) SubCutaneous every 8 hours  insulin lispro (HumaLOG) corrective regimen sliding scale   SubCutaneous Before meals and at bedtime  levothyroxine Injectable 60 MICROGram(s) IV Push <User Schedule>  metroNIDAZOLE  IVPB 500 milliGRAM(s) IV Intermittent every 8 hours  pantoprazole  Injectable 40 milliGRAM(s) IV Push daily    MEDICATIONS  (PRN):  dextrose 40% Gel 15 Gram(s) Oral once PRN Blood Glucose LESS THAN 70 milliGRAM(s)/deciliter  fentaNYL    Injectable 25 MICROGram(s) IV Push every 3 hours PRN Severe Pain (7 - 10)  fentaNYL    Injectable 12.5 MICROGram(s) IV Push every 3 hours PRN Moderate Pain (4 - 6)  glucagon  Injectable 1 milliGRAM(s) IntraMuscular once PRN Glucose LESS THAN 70 milligrams/deciliter      Vital Signs Last 24 Hrs  T(C): 36.8 (02 Sep 2020 09:10), Max: 37.3 (02 Sep 2020 06:00)  T(F): 98.2 (02 Sep 2020 09:10), Max: 99.2 (02 Sep 2020 06:00)  HR: 90 (02 Sep 2020 08:41) (84 - 104)  BP: 144/63 (02 Sep 2020 07:00) (95/55 - 147/70)  BP(mean): 91 (02 Sep 2020 07:00) (68 - 100)  RR: 16 (02 Sep 2020 08:41) (12 - 16)  SpO2: 100% (02 Sep 2020 08:41) (100% - 100%)    REVIEW OF SYSTEMS:  Gen: No fever  CVS: No chest pain  Resp: No shortness of breath  Abd: No nausea/ No vomiting/No abdominal pain  CNS: No headache      PHYSICAL EXAM:  GENERAL: alert, no acute distress at present  NECK: No JVD, trach in place, FiO2 at 40%  CHEST/LUNG: equal breath sounds bilaterally  HEART: normal S1S2, RRR  ABDOMEN: Soft, Nontender, +BS, No flank tenderness  EXTREMITIES: No LE edema, no calf tenderness bilateral  : Andres in place, anuria   Neurology: AAOx3, no focal neurological deficit  SKIN: No rashes  ACCESS: LUE AVF, good thrill and bruit appreciated      CAPILLARY BLOOD GLUCOSE      POCT Blood Glucose.: 130 mg/dL (02 Sep 2020 06:00)  POCT Blood Glucose.: 178 mg/dL (01 Sep 2020 21:57)  POCT Blood Glucose.: 137 mg/dL (01 Sep 2020 19:43)  POCT Blood Glucose.: 122 mg/dL (01 Sep 2020 17:06)  POCT Blood Glucose.: 107 mg/dL (01 Sep 2020 15:48)  POCT Blood Glucose.: 164 mg/dL (01 Sep 2020 14:26)  POCT Blood Glucose.: 180 mg/dL (01 Sep 2020 12:52)  POCT Blood Glucose.: 123 mg/dL (01 Sep 2020 10:26)      I&O's Summary    01 Sep 2020 07:01  -  02 Sep 2020 07:00  --------------------------------------------------------  IN: 1749.7 mL / OUT: 166.5 mL / NET: 1583.2 mL    02 Sep 2020 07:01  -  02 Sep 2020 09:19  --------------------------------------------------------  IN: 0 mL / OUT: 0 mL / NET: 0 mL          LABS:                            8.8    13.02 )-----------( 152      ( 02 Sep 2020 06:12 )             27.0       09-02    140  |  109<H>  |  39<H>  ----------------------------<  171<H>  5.8<H>   |  18<L>  |  5.81<H>    Ca    8.3<L>      02 Sep 2020 06:12  Phos  5.0     09-02  Mg     2.5     09-02        PTT - ( 01 Sep 2020 20:13 )  PTT:37.2 sec        RADIOLOGY & ADDITIONAL TESTS:    reviewed     < from: Xray Chest 1 View-PORTABLE IMMEDIATE (09.01.20 @ 21:11) >  EXAM:  XR CHEST PORTABLE IMMED 1V                          PROCEDURE DATE:  09/01/2020          INTERPRETATION:  Clinical history/reason for exam: NG tube placement.    Single frontal view.    COMPARISON: March 28, 2020.    Findings/  impression: Tracheostomy device in satisfactory position. Dobbhoff tube at the GE junction and may be advanced. Tubing overlying the left neck. Heart size within normal limits. Left basilar focal atelectasis.      < end of copied text >

## 2020-09-02 NOTE — PROGRESS NOTE ADULT - SUBJECTIVE AND OBJECTIVE BOX
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 s/p L hemigloss, L ND, R ALT FF, NGT, trach 8LPC     Hospital Course:  9/2: weaned off pressors in SICU. soft pressures s/p bolus of albumin and PRBC 1 unit each. otherwise stable. no sedation and no pressors during am rounds. weaned to CPAP 30%. flap doing well with strong signal. s/p 300 rectal ASA. on abx    Vital Signs Last 24 Hrs  T(C): 37.3 (02 Sep 2020 06:00), Max: 37.3 (02 Sep 2020 06:00)  T(F): 99.2 (02 Sep 2020 06:00), Max: 99.2 (02 Sep 2020 06:00)  HR: 87 (02 Sep 2020 07:00) (84 - 104)  BP: 144/63 (02 Sep 2020 07:00) (95/55 - 147/70)  BP(mean): 91 (02 Sep 2020 07:00) (68 - 100)  RR: 13 (02 Sep 2020 07:00) (12 - 16)  SpO2: 100% (02 Sep 2020 07:00) (100% - 100%)    MEDICATIONS  (STANDING):  albumin human  5% IVPB 250 milliLiter(s) IV Intermittent once  ceFAZolin   IVPB 1000 milliGRAM(s) IV Intermittent every 8 hours  chlorhexidine 0.12% Liquid 15 milliLiter(s) Oral Mucosa every 12 hours  chlorhexidine 2% Cloths 1 Application(s) Topical daily  dextrose 5%. 1000 milliLiter(s) (50 mL/Hr) IV Continuous <Continuous>  dextrose 50% Injectable 12.5 Gram(s) IV Push once  dextrose 50% Injectable 25 Gram(s) IV Push once  dextrose 50% Injectable 25 Gram(s) IV Push once  heparin   Injectable 5000 Unit(s) SubCutaneous every 8 hours  insulin lispro (HumaLOG) corrective regimen sliding scale   SubCutaneous Before meals and at bedtime  levothyroxine Injectable 60 MICROGram(s) IV Push <User Schedule>  metroNIDAZOLE  IVPB 500 milliGRAM(s) IV Intermittent every 8 hours  pantoprazole  Injectable 40 milliGRAM(s) IV Push daily      General: AAOx3, arousable  Pulm: Nonlabored breathing, no respiratory distress. 8LPC cuff up, CPAP 30%, saturating well  Extrem: WWP, no edema. R thigh x2 JPs SS drainage. incision c/d/i  HEENT: neck incision c/d/i, PEBBLES L neck SS, neck soft and flat. external flap doppler signal is strong for arterial. cook doppler for venous signal is wnl. intraoral incision line c/d/i, dariel webbl, pink.       09-01-20 @ 07:01  -  09-02-20 @ 07:00  --------------------------------------------------------  IN: 1599.7 mL / OUT: 166.5 mL / NET: 1433.2 mL                              8.8    13.02 )-----------( 152      ( 02 Sep 2020 06:12 )             27.0     09-02    140  |  109<H>  |  39<H>  ----------------------------<  171<H>  5.8<H>   |  18<L>  |  5.81<H>    Ca    8.3<L>      02 Sep 2020 06:12  Phos  5.0     09-02  Mg     2.5     09-02        PTT - ( 01 Sep 2020 20:13 )  PTT:37.2 sec    09-01-20 @ 07:01  -  09-02-20 @ 07:00  --------------------------------------------------------  IN: 1599.7 mL / OUT: 166.5 mL / NET: 1433.2 mL        A/P: 64M w/ pmhx of ESRD, HTN, DM and T3N1M0 left tongue SCCa now s/p left hemiglossectomy, left modified radical neck dissection, right ALT free flap and tracheotomy. s/p L hemigloss, L ND, R ALT FF, NGT, trach 8LPC  - HD today   - medicine on board if SICU agreeable  - start tube feeds today via NGT  - ASA 81 may start  - cont abx  - OOBTC  - flap checks q1 nursing, resident q4-6  - avoid pressors, and maintain MAP 70+  - regular trach suctioning and cleaning of inner cannula  - monitor PEBBLES output  - appreciate SICU care    ----------------------------------------------  Jorge Mack MD  Resident  Department of Otolaryngology - Head and Neck Surgery  [facility]

## 2020-09-02 NOTE — PROGRESS NOTE ADULT - SUBJECTIVE AND OBJECTIVE BOX
S: s/p hemiglossectomy and reconstruction with right ALT free flap yesterday.   No new issues/events overnight, no new med c/o    O: ICU Vital Signs Last 24 Hrs  T(F): 97.9 (09-02-20 @ 10:27), Max: 99.2 (09-02-20 @ 06:00)  HR: 108 (09-02-20 @ 13:00) (84 - 108)  BP: 111/55 (09-02-20 @ 13:00) (95/55 - 150/68)  BP(mean): 77 (09-02-20 @ 13:00) (68 - 100)  ABP: 100/42 (09-02-20 @ 13:00)  RR: 18 (09-02-20 @ 13:00) (12 - 18)  SpO2: 100% (09-02-20 @ 13:00) (100% - 100%)    PHYSICAL EXAM:   Neurological: AAOx3, CNII-XII intact,  strength 5/5 b/l  ENT: flap good signals and perfuson.   Cardiovascular: RRR  Respiratory: CTA  Gastrointestinal: soft, NT, ND, BS+  Extremities: warm, no dependent edema, right thigh flap site clean, PEBBLES serosang. no pus, no active bleeding.   Vascular: no cyanosis/erythema    LABS:    09-02    140  |  109<H>  |  39<H>  ----------------------------<  171<H>  5.8<H>   |  18<L>  |  5.81<H>    Ca    8.3<L>      02 Sep 2020 06:12  Phos  5.0     09-02  Mg     2.5     09-02                          8.8    13.02 )-----------( 152      ( 02 Sep 2020 06:12 )             27.0   PTT - ( 01 Sep 2020 20:13 )  PTT:37.2 sec  ABG - ( 01 Sep 2020 20:13 )  pH, Arterial: 7.44  pH, Blood: x     /  pCO2: 30    /  pO2: 207   / HCO3: 20    / Base Excess: -4.2  /  SaO2: 99              CAPILLARY BLOOD GLUCOSE      POCT Blood Glucose.: 154 mg/dL (02 Sep 2020 11:27)  POCT Blood Glucose.: 130 mg/dL (02 Sep 2020 06:00)  POCT Blood Glucose.: 178 mg/dL (01 Sep 2020 21:57)  POCT Blood Glucose.: 137 mg/dL (01 Sep 2020 19:43)  POCT Blood Glucose.: 122 mg/dL (01 Sep 2020 17:06)  POCT Blood Glucose.: 107 mg/dL (01 Sep 2020 15:48)  POCT Blood Glucose.: 164 mg/dL (01 Sep 2020 14:26)    MEDICATIONS  (STANDING):  albumin human  5% IVPB 250 milliLiter(s) IV Intermittent once  aspirin  chewable 81 milliGRAM(s) Oral daily  carvedilol 12.5 milliGRAM(s) Oral every 12 hours  ceFAZolin   IVPB 1000 milliGRAM(s) IV Intermittent every 8 hours  chlorhexidine 0.12% Liquid 15 milliLiter(s) Oral Mucosa every 12 hours  chlorhexidine 2% Cloths 1 Application(s) Topical daily  heparin   Injectable 5000 Unit(s) SubCutaneous every 8 hours  insulin lispro (HumaLOG) corrective regimen sliding scale   SubCutaneous Before meals and at bedtime  levothyroxine Injectable 60 MICROGram(s) IV Push <User Schedule>  metroNIDAZOLE  IVPB 500 milliGRAM(s) IV Intermittent every 8 hours  pantoprazole  Injectable 40 milliGRAM(s) IV Push daily    MEDICATIONS  (PRN):  dextrose 40% Gel 15 Gram(s) Oral once PRN Blood Glucose LESS THAN 70 milliGRAM(s)/deciliter  fentaNYL    Injectable 25 MICROGram(s) IV Push every 3 hours PRN Severe Pain (7 - 10)  fentaNYL    Injectable 12.5 MICROGram(s) IV Push every 3 hours PRN Moderate Pain (4 - 6)  glucagon  Injectable 1 milliGRAM(s) IntraMuscular once PRN Glucose LESS THAN 70 milligrams/deciliter      Andres:	  [ ] None	[ ] Daily Andres Order Placed	   Indication:	  [ ] Strict I and O's    [ ] Obstruction     [ ] Incontinence + Stage 3 or 4 Decubitus  Central Line:  [ ] None	   [ ]  Medication / TPN Administration     [ ] No Peripheral IV S: s/p hemiglossectomy and reconstruction with right ALT free flap yesterday.   No new issues/events overnight, no new med c/o    O: ICU Vital Signs Last 24 Hrs  T(F): 97.9 (09-02-20 @ 10:27), Max: 99.2 (09-02-20 @ 06:00)  HR: 108 (09-02-20 @ 13:00) (84 - 108)  BP: 111/55 (09-02-20 @ 13:00) (95/55 - 150/68)  BP(mean): 77 (09-02-20 @ 13:00) (68 - 100)  ABP: 100/42 (09-02-20 @ 13:00)  RR: 18 (09-02-20 @ 13:00) (12 - 18)  SpO2: 100% (09-02-20 @ 13:00) (100% - 100%)    PHYSICAL EXAM:   Neurological: AAOx3, CNII-XII intact,  strength 5/5 b/l  ENT: flap good signals and perfuson.   Cardiovascular: RRR  Respiratory: CTA  Gastrointestinal: soft, NT, ND, BS+  Extremities: warm, no dependent edema, right thigh flap site clean, PEBBLES serosang. no pus, no active bleeding.   Vascular: no cyanosis/erythema    LABS:    09-02    140  |  109<H>  |  39<H>  ----------------------------<  171<H>  5.8<H>   |  18<L>  |  5.81<H>    Ca    8.3<L>      02 Sep 2020 06:12  Phos  5.0     09-02  Mg     2.5     09-02                          8.8    13.02 )-----------( 152      ( 02 Sep 2020 06:12 )             27.0   PTT - ( 01 Sep 2020 20:13 )  PTT:37.2 sec  ABG - ( 01 Sep 2020 20:13 )  pH, Arterial: 7.44  pH, Blood: x     /  pCO2: 30    /  pO2: 207   / HCO3: 20    / Base Excess: -4.2  /  SaO2: 99              CAPILLARY BLOOD GLUCOSE      POCT Blood Glucose.: 154 mg/dL (02 Sep 2020 11:27)  POCT Blood Glucose.: 130 mg/dL (02 Sep 2020 06:00)  POCT Blood Glucose.: 178 mg/dL (01 Sep 2020 21:57)  POCT Blood Glucose.: 137 mg/dL (01 Sep 2020 19:43)  POCT Blood Glucose.: 122 mg/dL (01 Sep 2020 17:06)  POCT Blood Glucose.: 107 mg/dL (01 Sep 2020 15:48)  POCT Blood Glucose.: 164 mg/dL (01 Sep 2020 14:26)    MEDICATIONS  (STANDING):  albumin human  5% IVPB 250 milliLiter(s) IV Intermittent once  aspirin  chewable 81 milliGRAM(s) Oral daily  carvedilol 12.5 milliGRAM(s) Oral every 12 hours  ceFAZolin   IVPB 1000 milliGRAM(s) IV Intermittent every 8 hours  chlorhexidine 0.12% Liquid 15 milliLiter(s) Oral Mucosa every 12 hours  chlorhexidine 2% Cloths 1 Application(s) Topical daily  heparin   Injectable 5000 Unit(s) SubCutaneous every 8 hours  insulin lispro (HumaLOG) corrective regimen sliding scale   SubCutaneous Before meals and at bedtime  levothyroxine Injectable 60 MICROGram(s) IV Push <User Schedule>  metroNIDAZOLE  IVPB 500 milliGRAM(s) IV Intermittent every 8 hours  pantoprazole  Injectable 40 milliGRAM(s) IV Push daily    MEDICATIONS  (PRN):  dextrose 40% Gel 15 Gram(s) Oral once PRN Blood Glucose LESS THAN 70 milliGRAM(s)/deciliter  fentaNYL    Injectable 25 MICROGram(s) IV Push every 3 hours PRN Severe Pain (7 - 10)  fentaNYL    Injectable 12.5 MICROGram(s) IV Push every 3 hours PRN Moderate Pain (4 - 6)  glucagon  Injectable 1 milliGRAM(s) IntraMuscular once PRN Glucose LESS THAN 70 milligrams/deciliter      Andres:	  [x ] None	[ ] Daily Andres Order Placed	   Indication:	  [ ] Strict I and O's    [ ] Obstruction     [ ] Incontinence + Stage 3 or 4 Decubitus  Central Line:  [x ] None	   [ ]  Medication / TPN Administration     [ ] No Peripheral IV

## 2020-09-03 ENCOUNTER — TRANSCRIPTION ENCOUNTER (OUTPATIENT)
Age: 65
End: 2020-09-03

## 2020-09-03 LAB
ALBUMIN SERPL ELPH-MCNC: 2.5 G/DL — LOW (ref 3.3–5)
ANION GAP SERPL CALC-SCNC: 10 MMOL/L — SIGNIFICANT CHANGE UP (ref 5–17)
APTT BLD: 36.6 SEC — HIGH (ref 27.5–35.5)
BUN SERPL-MCNC: 39 MG/DL — HIGH (ref 7–23)
CALCIUM SERPL-MCNC: 8 MG/DL — LOW (ref 8.4–10.5)
CHLORIDE SERPL-SCNC: 99 MMOL/L — SIGNIFICANT CHANGE UP (ref 96–108)
CO2 SERPL-SCNC: 26 MMOL/L — SIGNIFICANT CHANGE UP (ref 22–31)
CREAT SERPL-MCNC: 4.84 MG/DL — HIGH (ref 0.5–1.3)
GLUCOSE BLDC GLUCOMTR-MCNC: 139 MG/DL — HIGH (ref 70–99)
GLUCOSE BLDC GLUCOMTR-MCNC: 188 MG/DL — HIGH (ref 70–99)
GLUCOSE BLDC GLUCOMTR-MCNC: 196 MG/DL — HIGH (ref 70–99)
GLUCOSE BLDC GLUCOMTR-MCNC: 226 MG/DL — HIGH (ref 70–99)
GLUCOSE BLDC GLUCOMTR-MCNC: 236 MG/DL — HIGH (ref 70–99)
GLUCOSE BLDC GLUCOMTR-MCNC: 259 MG/DL — HIGH (ref 70–99)
GLUCOSE BLDC GLUCOMTR-MCNC: 84 MG/DL — SIGNIFICANT CHANGE UP (ref 70–99)
GLUCOSE SERPL-MCNC: 254 MG/DL — HIGH (ref 70–99)
HCT VFR BLD CALC: 20.4 % — CRITICAL LOW (ref 39–50)
HCT VFR BLD CALC: 21.2 % — LOW (ref 39–50)
HCT VFR BLD CALC: 24.6 % — LOW (ref 39–50)
HGB BLD-MCNC: 6.7 G/DL — CRITICAL LOW (ref 13–17)
HGB BLD-MCNC: 6.9 G/DL — CRITICAL LOW (ref 13–17)
HGB BLD-MCNC: 8 G/DL — LOW (ref 13–17)
INR BLD: 0.94 — SIGNIFICANT CHANGE UP (ref 0.88–1.16)
MAGNESIUM SERPL-MCNC: 2.6 MG/DL — SIGNIFICANT CHANGE UP (ref 1.6–2.6)
MCHC RBC-ENTMCNC: 28.8 PG — SIGNIFICANT CHANGE UP (ref 27–34)
MCHC RBC-ENTMCNC: 30.1 PG — SIGNIFICANT CHANGE UP (ref 27–34)
MCHC RBC-ENTMCNC: 30.3 PG — SIGNIFICANT CHANGE UP (ref 27–34)
MCHC RBC-ENTMCNC: 32.5 GM/DL — SIGNIFICANT CHANGE UP (ref 32–36)
MCHC RBC-ENTMCNC: 32.5 GM/DL — SIGNIFICANT CHANGE UP (ref 32–36)
MCHC RBC-ENTMCNC: 32.8 GM/DL — SIGNIFICANT CHANGE UP (ref 32–36)
MCV RBC AUTO: 88.5 FL — SIGNIFICANT CHANGE UP (ref 80–100)
MCV RBC AUTO: 92.3 FL — SIGNIFICANT CHANGE UP (ref 80–100)
MCV RBC AUTO: 92.6 FL — SIGNIFICANT CHANGE UP (ref 80–100)
NRBC # BLD: 0 /100 WBCS — SIGNIFICANT CHANGE UP (ref 0–0)
PHOSPHATE SERPL-MCNC: 4.7 MG/DL — HIGH (ref 2.5–4.5)
PLATELET # BLD AUTO: 123 K/UL — LOW (ref 150–400)
PLATELET # BLD AUTO: 129 K/UL — LOW (ref 150–400)
PLATELET # BLD AUTO: 131 K/UL — LOW (ref 150–400)
POTASSIUM SERPL-MCNC: 4.6 MMOL/L — SIGNIFICANT CHANGE UP (ref 3.5–5.3)
POTASSIUM SERPL-SCNC: 4.6 MMOL/L — SIGNIFICANT CHANGE UP (ref 3.5–5.3)
PROTHROM AB SERPL-ACNC: 11.3 SEC — SIGNIFICANT CHANGE UP (ref 10.6–13.6)
RBC # BLD: 2.21 M/UL — LOW (ref 4.2–5.8)
RBC # BLD: 2.29 M/UL — LOW (ref 4.2–5.8)
RBC # BLD: 2.78 M/UL — LOW (ref 4.2–5.8)
RBC # FLD: 16 % — HIGH (ref 10.3–14.5)
RBC # FLD: 16.1 % — HIGH (ref 10.3–14.5)
RBC # FLD: 16.2 % — HIGH (ref 10.3–14.5)
SODIUM SERPL-SCNC: 135 MMOL/L — SIGNIFICANT CHANGE UP (ref 135–145)
WBC # BLD: 9.06 K/UL — SIGNIFICANT CHANGE UP (ref 3.8–10.5)
WBC # BLD: 9.28 K/UL — SIGNIFICANT CHANGE UP (ref 3.8–10.5)
WBC # BLD: 9.58 K/UL — SIGNIFICANT CHANGE UP (ref 3.8–10.5)
WBC # FLD AUTO: 9.06 K/UL — SIGNIFICANT CHANGE UP (ref 3.8–10.5)
WBC # FLD AUTO: 9.28 K/UL — SIGNIFICANT CHANGE UP (ref 3.8–10.5)
WBC # FLD AUTO: 9.58 K/UL — SIGNIFICANT CHANGE UP (ref 3.8–10.5)

## 2020-09-03 PROCEDURE — 99233 SBSQ HOSP IP/OBS HIGH 50: CPT | Mod: GC

## 2020-09-03 PROCEDURE — 99232 SBSQ HOSP IP/OBS MODERATE 35: CPT

## 2020-09-03 RX ORDER — INSULIN GLARGINE 100 [IU]/ML
10 INJECTION, SOLUTION SUBCUTANEOUS EVERY MORNING
Refills: 0 | Status: DISCONTINUED | OUTPATIENT
Start: 2020-09-04 | End: 2020-09-15

## 2020-09-03 RX ORDER — CARVEDILOL PHOSPHATE 80 MG/1
25 CAPSULE, EXTENDED RELEASE ORAL EVERY 12 HOURS
Refills: 0 | Status: DISCONTINUED | OUTPATIENT
Start: 2020-09-03 | End: 2020-09-09

## 2020-09-03 RX ORDER — AMLODIPINE BESYLATE 2.5 MG/1
10 TABLET ORAL ONCE
Refills: 0 | Status: COMPLETED | OUTPATIENT
Start: 2020-09-03 | End: 2020-09-03

## 2020-09-03 RX ORDER — INSULIN LISPRO 100/ML
VIAL (ML) SUBCUTANEOUS
Refills: 0 | Status: DISCONTINUED | OUTPATIENT
Start: 2020-09-03 | End: 2020-09-18

## 2020-09-03 RX ORDER — INSULIN GLARGINE 100 [IU]/ML
10 INJECTION, SOLUTION SUBCUTANEOUS ONCE
Refills: 0 | Status: COMPLETED | OUTPATIENT
Start: 2020-09-03 | End: 2020-09-03

## 2020-09-03 RX ORDER — INSULIN LISPRO 100/ML
VIAL (ML) SUBCUTANEOUS EVERY 6 HOURS
Refills: 0 | Status: DISCONTINUED | OUTPATIENT
Start: 2020-09-03 | End: 2020-09-03

## 2020-09-03 RX ORDER — CHLORHEXIDINE GLUCONATE 213 G/1000ML
15 SOLUTION TOPICAL
Refills: 0 | Status: DISCONTINUED | OUTPATIENT
Start: 2020-09-03 | End: 2020-09-03

## 2020-09-03 RX ORDER — AMLODIPINE BESYLATE 2.5 MG/1
10 TABLET ORAL DAILY
Refills: 0 | Status: DISCONTINUED | OUTPATIENT
Start: 2020-09-04 | End: 2020-09-09

## 2020-09-03 RX ORDER — CEFAZOLIN SODIUM 1 G
1000 VIAL (EA) INJECTION EVERY 24 HOURS
Refills: 0 | Status: DISCONTINUED | OUTPATIENT
Start: 2020-09-03 | End: 2020-09-03

## 2020-09-03 RX ORDER — LEVOTHYROXINE SODIUM 125 MCG
75 TABLET ORAL DAILY
Refills: 0 | Status: DISCONTINUED | OUTPATIENT
Start: 2020-09-03 | End: 2020-09-09

## 2020-09-03 RX ORDER — CEFAZOLIN SODIUM 1 G
1000 VIAL (EA) INJECTION EVERY 24 HOURS
Refills: 0 | Status: DISCONTINUED | OUTPATIENT
Start: 2020-09-04 | End: 2020-09-13

## 2020-09-03 RX ORDER — CARVEDILOL PHOSPHATE 80 MG/1
12.5 CAPSULE, EXTENDED RELEASE ORAL ONCE
Refills: 0 | Status: COMPLETED | OUTPATIENT
Start: 2020-09-03 | End: 2020-09-03

## 2020-09-03 RX ADMIN — HYDROMORPHONE HYDROCHLORIDE 0.5 MILLIGRAM(S): 2 INJECTION INTRAMUSCULAR; INTRAVENOUS; SUBCUTANEOUS at 10:00

## 2020-09-03 RX ADMIN — HEPARIN SODIUM 5000 UNIT(S): 5000 INJECTION INTRAVENOUS; SUBCUTANEOUS at 18:08

## 2020-09-03 RX ADMIN — HYDROMORPHONE HYDROCHLORIDE 0.5 MILLIGRAM(S): 2 INJECTION INTRAMUSCULAR; INTRAVENOUS; SUBCUTANEOUS at 20:35

## 2020-09-03 RX ADMIN — Medication 60 MICROGRAM(S): at 06:22

## 2020-09-03 RX ADMIN — CHLORHEXIDINE GLUCONATE 1 APPLICATION(S): 213 SOLUTION TOPICAL at 13:48

## 2020-09-03 RX ADMIN — CARVEDILOL PHOSPHATE 12.5 MILLIGRAM(S): 80 CAPSULE, EXTENDED RELEASE ORAL at 03:04

## 2020-09-03 RX ADMIN — AMLODIPINE BESYLATE 10 MILLIGRAM(S): 2.5 TABLET ORAL at 12:32

## 2020-09-03 RX ADMIN — PANTOPRAZOLE SODIUM 40 MILLIGRAM(S): 20 TABLET, DELAYED RELEASE ORAL at 12:34

## 2020-09-03 RX ADMIN — HYDROMORPHONE HYDROCHLORIDE 0.5 MILLIGRAM(S): 2 INJECTION INTRAMUSCULAR; INTRAVENOUS; SUBCUTANEOUS at 00:53

## 2020-09-03 RX ADMIN — Medication 100 MILLIGRAM(S): at 05:13

## 2020-09-03 RX ADMIN — HYDROMORPHONE HYDROCHLORIDE 0.5 MILLIGRAM(S): 2 INJECTION INTRAMUSCULAR; INTRAVENOUS; SUBCUTANEOUS at 01:05

## 2020-09-03 RX ADMIN — CARVEDILOL PHOSPHATE 25 MILLIGRAM(S): 80 CAPSULE, EXTENDED RELEASE ORAL at 18:09

## 2020-09-03 RX ADMIN — Medication 75 MICROGRAM(S): at 12:32

## 2020-09-03 RX ADMIN — HEPARIN SODIUM 5000 UNIT(S): 5000 INJECTION INTRAVENOUS; SUBCUTANEOUS at 08:37

## 2020-09-03 RX ADMIN — Medication 2: at 22:14

## 2020-09-03 RX ADMIN — HYDROMORPHONE HYDROCHLORIDE 0.5 MILLIGRAM(S): 2 INJECTION INTRAMUSCULAR; INTRAVENOUS; SUBCUTANEOUS at 09:56

## 2020-09-03 RX ADMIN — HYDROMORPHONE HYDROCHLORIDE 0.5 MILLIGRAM(S): 2 INJECTION INTRAMUSCULAR; INTRAVENOUS; SUBCUTANEOUS at 05:14

## 2020-09-03 RX ADMIN — HYDROMORPHONE HYDROCHLORIDE 0.5 MILLIGRAM(S): 2 INJECTION INTRAMUSCULAR; INTRAVENOUS; SUBCUTANEOUS at 20:17

## 2020-09-03 RX ADMIN — Medication 100 MILLIGRAM(S): at 06:21

## 2020-09-03 RX ADMIN — CHLORHEXIDINE GLUCONATE 15 MILLILITER(S): 213 SOLUTION TOPICAL at 05:13

## 2020-09-03 RX ADMIN — HEPARIN SODIUM 5000 UNIT(S): 5000 INJECTION INTRAVENOUS; SUBCUTANEOUS at 00:53

## 2020-09-03 RX ADMIN — Medication 4: at 12:47

## 2020-09-03 RX ADMIN — Medication 100 MILLIGRAM(S): at 21:58

## 2020-09-03 RX ADMIN — CARVEDILOL PHOSPHATE 12.5 MILLIGRAM(S): 80 CAPSULE, EXTENDED RELEASE ORAL at 06:21

## 2020-09-03 RX ADMIN — INSULIN GLARGINE 10 UNIT(S): 100 INJECTION, SOLUTION SUBCUTANEOUS at 12:47

## 2020-09-03 RX ADMIN — HYDROMORPHONE HYDROCHLORIDE 0.5 MILLIGRAM(S): 2 INJECTION INTRAMUSCULAR; INTRAVENOUS; SUBCUTANEOUS at 05:00

## 2020-09-03 RX ADMIN — Medication 6: at 06:21

## 2020-09-03 RX ADMIN — CHLORHEXIDINE GLUCONATE 15 MILLILITER(S): 213 SOLUTION TOPICAL at 18:08

## 2020-09-03 RX ADMIN — Medication 81 MILLIGRAM(S): at 12:32

## 2020-09-03 RX ADMIN — Medication 100 MILLIGRAM(S): at 13:48

## 2020-09-03 NOTE — PROGRESS NOTE ADULT - ATTENDING COMMENTS
ELTON Sheridan has acted as my scribe. coreg resumed and will add amlodipine. for HTn. Continue enteral feeds and start Lantus 10 units with sliding scale coverage.

## 2020-09-03 NOTE — CHART NOTE - NSCHARTNOTEFT_GEN_A_CORE
Admitting Diagnosis:   Patient is a 64y old  Male who presents with a chief complaint of Post surgical care (03 Sep 2020 12:11)    Consult: Yes [   ]  No [ X  ]    Reason for Initial Nutrition Assessment: ICU LOS    PAST MEDICAL & SURGICAL HISTORY:  Hypothyroidism  Lower extremity edema  Hyperlipidemia  BPH (benign prostatic hyperplasia)  Type II diabetes mellitus  Essential hypertension  Chronic kidney disease (CKD), stage V: Hemodialysis  AV fistula: left UE  S/P appendectomy    Current Nutrition Order:  NPO with TF via NGT  Nepro at 45mL/hr x24h (1080mL TV, 1944 kcal, 87gm protein, 785mL free water, 114% RDI, ~27 kcal/kg      PO Intake: Good (%) [   ]  Fair (50-75%) [   ] Poor (<25%) [   ]-N/A    GI Issues: no N/V, no BM since admission per RN (no bowel regimen ordered)    Pain: none apparent at this time    Skin Integrity: Fantasma score 17    Labs:   09-03    135  |  99  |  39<H>  ----------------------------<  254<H>  4.6   |  26  |  4.84<H>    Ca    8.0<L>      03 Sep 2020 06:11  Phos  4.7     09-03  Mg     2.6     09-03    TPro  x   /  Alb  2.5<L>  /  TBili  x   /  DBili  x   /  AST  x   /  ALT  x   /  AlkPhos  x   09-03    CAPILLARY BLOOD GLUCOSE  POCT Blood Glucose.: 226 mg/dL (03 Sep 2020 12:38)  POCT Blood Glucose.: 236 mg/dL (03 Sep 2020 12:34)  POCT Blood Glucose.: 188 mg/dL (03 Sep 2020 10:52)  POCT Blood Glucose.: 259 mg/dL (03 Sep 2020 06:02)  POCT Blood Glucose.: 84 mg/dL (03 Sep 2020 05:14)  POCT Blood Glucose.: 221 mg/dL (02 Sep 2020 21:39)  POCT Blood Glucose.: 137 mg/dL (02 Sep 2020 16:07)    Nutritionally Pertinent Lab Values:  9/3:  BUN 39, Cr 4.84, Glu 254, GFR 12, POCT 259, 84, phos 4.7    Medications:  MEDICATIONS  (STANDING):  aspirin  chewable 81 milliGRAM(s) Oral daily  carvedilol 25 milliGRAM(s) Oral every 12 hours  chlorhexidine 0.12% Liquid 15 milliLiter(s) Oral Mucosa every 12 hours  chlorhexidine 2% Cloths 1 Application(s) Topical daily  dextrose 5%. 1000 milliLiter(s) (50 mL/Hr) IV Continuous <Continuous>  dextrose 50% Injectable 12.5 Gram(s) IV Push once  dextrose 50% Injectable 25 Gram(s) IV Push once  dextrose 50% Injectable 25 Gram(s) IV Push once  heparin   Injectable 5000 Unit(s) SubCutaneous every 8 hours  insulin lispro (HumaLOG) corrective regimen sliding scale   SubCutaneous Before meals and at bedtime  levothyroxine 75 MICROGram(s) Oral daily  metroNIDAZOLE  IVPB 500 milliGRAM(s) IV Intermittent every 8 hours  pantoprazole  Injectable 40 milliGRAM(s) IV Push daily    MEDICATIONS  (PRN):  dextrose 40% Gel 15 Gram(s) Oral once PRN Blood Glucose LESS THAN 70 milliGRAM(s)/deciliter  glucagon  Injectable 1 milliGRAM(s) IntraMuscular once PRN Glucose LESS THAN 70 milligrams/deciliter  HYDROmorphone  Injectable 0.5 milliGRAM(s) IV Push every 4 hours PRN Moderate Pain (4 - 6)  HYDROmorphone  Injectable 1 milliGRAM(s) IV Push every 4 hours PRN Severe Pain (7 - 10)    Admitted Anthropometrics:  Ht (9/1): 175.3cm, Wt (9/2): 72.7kg, IBW: 72.7kg+/-10%, %IBW: 100%, BMI: 23.7     Nutrition Focused Physical Exam: Completed [   ]  Unable to complete [   ]-N/A    Estimated energy needs:   ABW (72.7kg) used to calculate energy needs due to pt's current body weight within % IBW.  Needs adjusted for hypermetabolic state, post-op.    5342-3110 kcal (30-35 kcal/kg ABW)  87-109gm protein (1.2-1.5gm/kg ABW)  Fluid needs per team 2/2 renal function    Subjective:     Nutrition Diagnosis:  Increased nutrient needs RT increased kcal/protein demand AEB post-op, hypermetabolic state    Goal: Meet >75%EER via most appropriate route with good tolerance     Recommendations:  1. Transition to bolus feeds per team request. Recommend Nepro goal bolus: 5 cans/day with Prostat SF 1x/day (1185mL TV, 2233 kcal, 110gm protein, 861mL free water, 125% RDI, ~31 kcal/kg ABW, ~1.5gm protein/kg ABW)  >>start with 1 can at first feeding, if tolerated, can increase to 2 cans at second feeding  >>additional water flushes per team (please flush tube with 30mL pre/post each bolus as well as each time prostat is administered)  >>monitor need for phos binder  2. Monitor labs (BMP, lytes, [id number])  3. Trend daily weight on dialysis    Education: N/A as pt sleeping at this time; f/u with pt per protocol    Risk Level: High [ X ] Moderate [   ] Low [   ] Admitting Diagnosis:   Patient is a 64y old  Male who presents with a chief complaint of Post surgical care (03 Sep 2020 12:11)    Consult: Yes [   ]  No [ X  ]    Reason for Initial Nutrition Assessment: ICU LOS    PAST MEDICAL & SURGICAL HISTORY:  Hypothyroidism  Lower extremity edema  Hyperlipidemia  BPH (benign prostatic hyperplasia)  Type II diabetes mellitus  Essential hypertension  Chronic kidney disease (CKD), stage V: Hemodialysis  AV fistula: left UE  S/P appendectomy    Current Nutrition Order:  NPO with TF via NGT  Nepro at 45mL/hr x24h (1080mL TV, 1944 kcal, 87gm protein, 785mL free water, 114% RDI, ~27 kcal/kg)    PO Intake: Good (%) [   ]  Fair (50-75%) [   ] Poor (<25%) [   ]-N/A    GI Issues: no N/V, no BM since admission per RN (no bowel regimen ordered)    Pain: none apparent at this time    Skin Integrity: Fantasma score 17    Labs:   09-03    135  |  99  |  39<H>  ----------------------------<  254<H>  4.6   |  26  |  4.84<H>    Ca    8.0<L>      03 Sep 2020 06:11  Phos  4.7     09-03  Mg     2.6     09-03    TPro  x   /  Alb  2.5<L>  /  TBili  x   /  DBili  x   /  AST  x   /  ALT  x   /  AlkPhos  x   09-03    CAPILLARY BLOOD GLUCOSE  POCT Blood Glucose.: 226 mg/dL (03 Sep 2020 12:38)  POCT Blood Glucose.: 236 mg/dL (03 Sep 2020 12:34)  POCT Blood Glucose.: 188 mg/dL (03 Sep 2020 10:52)  POCT Blood Glucose.: 259 mg/dL (03 Sep 2020 06:02)  POCT Blood Glucose.: 84 mg/dL (03 Sep 2020 05:14)  POCT Blood Glucose.: 221 mg/dL (02 Sep 2020 21:39)  POCT Blood Glucose.: 137 mg/dL (02 Sep 2020 16:07)    Nutritionally Pertinent Lab Values:  9/3:  BUN 39, Cr 4.84, Glu 254, GFR 12, POCT 259, 84, phos 4.7    Medications:  MEDICATIONS  (STANDING):  aspirin  chewable 81 milliGRAM(s) Oral daily  carvedilol 25 milliGRAM(s) Oral every 12 hours  chlorhexidine 0.12% Liquid 15 milliLiter(s) Oral Mucosa every 12 hours  chlorhexidine 2% Cloths 1 Application(s) Topical daily  dextrose 5%. 1000 milliLiter(s) (50 mL/Hr) IV Continuous <Continuous>  dextrose 50% Injectable 12.5 Gram(s) IV Push once  dextrose 50% Injectable 25 Gram(s) IV Push once  dextrose 50% Injectable 25 Gram(s) IV Push once  heparin   Injectable 5000 Unit(s) SubCutaneous every 8 hours  insulin lispro (HumaLOG) corrective regimen sliding scale   SubCutaneous Before meals and at bedtime  levothyroxine 75 MICROGram(s) Oral daily  metroNIDAZOLE  IVPB 500 milliGRAM(s) IV Intermittent every 8 hours  pantoprazole  Injectable 40 milliGRAM(s) IV Push daily    MEDICATIONS  (PRN):  dextrose 40% Gel 15 Gram(s) Oral once PRN Blood Glucose LESS THAN 70 milliGRAM(s)/deciliter  glucagon  Injectable 1 milliGRAM(s) IntraMuscular once PRN Glucose LESS THAN 70 milligrams/deciliter  HYDROmorphone  Injectable 0.5 milliGRAM(s) IV Push every 4 hours PRN Moderate Pain (4 - 6)  HYDROmorphone  Injectable 1 milliGRAM(s) IV Push every 4 hours PRN Severe Pain (7 - 10)    Admitted Anthropometrics:  Ht (9/1): 175.3cm, Wt (9/2): 72.7kg, IBW: 72.7kg+/-10%, %IBW: 100%, BMI: 23.7     Nutrition Focused Physical Exam: Completed [   ]  Unable to complete [   ]-N/A    Estimated energy needs:   ABW (72.7kg) used to calculate energy needs due to pt's current body weight within % IBW.  Needs adjusted for hypermetabolic state, post-op.    8300-5613 kcal (30-35 kcal/kg ABW)  87-109gm protein (1.2-1.5gm/kg ABW)  Fluid needs per team 2/2 renal function    Subjective: 64M w/ pmhx of ESRD, HTN, DM and T3N1M0 left tongue SCCa now s/p left hemiglossectomy, left modified radical neck dissection, right ALT free flap and tracheotomy on 9/1. Nephrology following- last HD 9/2, able to tolerate 1.0 L UF, plan for next HD 9/4. Pt sleeping in bed this AM on assessment, unable to obtain information regarding diet/wt hx at this time, NKFA per chart. Pt tolerating trach collar per team. Nepro TF running at 45mL/hr, pt tolerating well per RN. Plan to transition to bolus feeds per PA. Please see below for full nutritional recommendations- d/w team. RD to monitor and f/u per protocol.    Nutrition Diagnosis:  Increased nutrient needs RT increased kcal/protein demand AEB post-op, hypermetabolic state    Goal: Meet >75%EER via most appropriate route with good tolerance     Recommendations:  1. Transition to bolus feeds per team request. Recommend Nepro goal bolus: 5 cans/day with Prostat SF 1x/day (1185mL TV, 2233 kcal, 110gm protein, 861mL free water, 125% RDI, ~31 kcal/kg ABW, ~1.5gm protein/kg ABW)  >>start with 1 can at first feeding, if tolerated, can increase to 2 cans at second feeding  >>additional water flushes per team (please flush tube with 30mL pre/post each bolus as well as each time prostat is administered)  >>monitor need for phos binder  2. Monitor labs (BMP, lytes, [id number])  3. Trend daily weight on dialysis  4. Recommend add bowel regimen as needed    Education: N/A as pt sleeping at this time; f/u with pt per protocol    Risk Level: High [ X ] Moderate [   ] Low [   ]

## 2020-09-03 NOTE — PROGRESS NOTE ADULT - SUBJECTIVE AND OBJECTIVE BOX
Patient is a 64y Male seen and evaluated at bedside.       Meds:  aspirin  chewable 81 daily  carvedilol 25 every 12 hours  chlorhexidine 0.12% Liquid 15 every 12 hours  chlorhexidine 2% Cloths 1 daily  dextrose 40% Gel 15 once PRN  dextrose 5%. 1000 <Continuous>  dextrose 50% Injectable 12.5 once  dextrose 50% Injectable 25 once  dextrose 50% Injectable 25 once  glucagon  Injectable 1 once PRN  heparin   Injectable 5000 every 8 hours  HYDROmorphone  Injectable 0.5 every 4 hours PRN  HYDROmorphone  Injectable 1 every 4 hours PRN  insulin lispro (HumaLOG) corrective regimen sliding scale  Before meals and at bedtime  levothyroxine 75 daily  metroNIDAZOLE  IVPB 500 every 8 hours  pantoprazole  Injectable 40 daily      T(C): , Max: 37.3 (09-02-20 @ 18:08)  T(F): , Max: 99.2 (09-03-20 @ 00:26)  HR: 91 (09-03-20 @ 15:00)  BP: 166/71 (09-03-20 @ 04:00)  BP(mean): 102 (09-03-20 @ 04:00)  RR: 15 (09-03-20 @ 15:00)  SpO2: 100% (09-03-20 @ 15:00)  Wt(kg): --    09-02 @ 07:01  -  09-03 @ 07:00  --------------------------------------------------------  IN: 1210 mL / OUT: 1765 mL / NET: -555 mL    09-03 @ 07:01  -  09-03 @ 16:04  --------------------------------------------------------  IN: 770 mL / OUT: 45 mL / NET: 725 mL        PHYSICAL EXAM:  GENERAL: alert, no acute distress at present  NECK: No JVD, trach in place, FiO2 at 40%  CHEST/LUNG: equal breath sounds bilaterally  HEART: normal S1S2, RRR  ABDOMEN: Soft, Nontender, +BS, No flank tenderness  EXTREMITIES: No LE edema bilateral  Neurology: AAOx3, no focal neurological deficit  SKIN: No rashes  ACCESS: LUE AVF, good thrill and bruit appreciated      LABS:                        8.0    9.28  )-----------( 123      ( 03 Sep 2020 15:09 )             24.6     09-03    135  |  99  |  39<H>  ----------------------------<  254<H>  4.6   |  26  |  4.84<H>    Ca    8.0<L>      03 Sep 2020 06:11  Phos  4.7     09-03  Mg     2.6     09-03    TPro  x   /  Alb  2.5<L>  /  TBili  x   /  DBili  x   /  AST  x   /  ALT  x   /  AlkPhos  x   09-03      PTT - ( 01 Sep 2020 20:13 )  PTT:37.2 sec          RADIOLOGY & ADDITIONAL STUDIES:    reviewed

## 2020-09-03 NOTE — PROGRESS NOTE ADULT - ASSESSMENT
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      Neuro:  dilaudid PRN.   HEENT: Neutral to slightly right neck position Flap checks q1h, ASA 81 qd.   CV: BP high, incr coreg to home dose of 25bid. restart home norvasc. will restart home hydralazine PO if pt still hypertensive.   Pulm: new Trach 8 Cuff deflated, on trach collar. no resp distress.   GI: NPO Doboff, tolerating TF at goal. switch to bolus TF. Protonix,   : ESRD on HD. HD yesterday. does makes a little urine. restart flomax if BP ok.   ID: ppx: Ancef(9/1-) Flagyl(9/1 - )   Endo: ISS, home Levothyroxine  ppx: SCD  SQH  Lines: PIV Ivana( 9/1-)   Wounds: PEBBLES x2 in rt thigh  Left Neck PEBBLES  PT/OT: ordered. can OOB to chair per ENT

## 2020-09-03 NOTE — PROGRESS NOTE ADULT - ASSESSMENT
63 yo M w/ pmhx of ESRD, HTN, DM and T3N1M0 left tongue SCCa now s/p left hemiglossectomy, left modified radical neck dissection, right ALT free flap and tracheotomy. Nephrology consulted for dialysis arrangement.      # ESRD on HD TTS via LUE AVF   - last HD 9/2, able to tolerate 1.0 L UF (not as prescribed due to hypotension)  - volume status and electrolytes noted, acceptable  - plan for HD 9/4  - EDW TBD  - daily weights  - renal diet  - strict I/O     # HTN  - UF with HD    # Renal bone disease  - phos, calcium noted, acceptable     # Anemia  - s/p hemotransfusions  - monitor H/H  - no DANIEL in setting of malignancy

## 2020-09-03 NOTE — PROGRESS NOTE ADULT - SUBJECTIVE AND OBJECTIVE BOX
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 s/p L hemigloss, L ND, R ALT FF, NGT, trach 8LPC     Hospital Course:  9/2: weaned off pressors in SICU. soft pressures s/p bolus of albumin and PRBC 1 unit each. otherwise stable. no sedation and no pressors during am rounds. weaned to CPAP 30%. flap doing well with strong signal. s/p 300 rectal ASA. on abx  9/3: NAEON, flap doing well. pressures stable with MAPs >65. hgb 6.7, s/p 1u PRBC > r/p hgb 8. HD tomorrow per renal. PT unable to see today b/c anemia    Vital Signs Last 24 Hrs  T(C): 36.4 (03 Sep 2020 17:22), Max: 37.3 (03 Sep 2020 00:26)  T(F): 97.6 (03 Sep 2020 17:22), Max: 99.2 (03 Sep 2020 00:26)  HR: 89 (03 Sep 2020 19:00) (82 - 114)  BP: 166/71 (03 Sep 2020 04:00) (161/69 - 176/78)  BP(mean): 102 (03 Sep 2020 04:00) (99 - 112)  RR: 16 (03 Sep 2020 19:00) (11 - 26)  SpO2: 100% (03 Sep 2020 19:00) (96% - 100%)    MEDICATIONS  (STANDING):  aspirin  chewable 81 milliGRAM(s) Oral daily  carvedilol 25 milliGRAM(s) Oral every 12 hours  chlorhexidine 0.12% Liquid 15 milliLiter(s) Oral Mucosa every 12 hours  chlorhexidine 2% Cloths 1 Application(s) Topical daily  dextrose 5%. 1000 milliLiter(s) (50 mL/Hr) IV Continuous <Continuous>  dextrose 50% Injectable 12.5 Gram(s) IV Push once  dextrose 50% Injectable 25 Gram(s) IV Push once  dextrose 50% Injectable 25 Gram(s) IV Push once  heparin   Injectable 5000 Unit(s) SubCutaneous every 8 hours  insulin lispro (HumaLOG) corrective regimen sliding scale   SubCutaneous Before meals and at bedtime  levothyroxine 75 MICROGram(s) Oral daily  metroNIDAZOLE  IVPB 500 milliGRAM(s) IV Intermittent every 8 hours  pantoprazole  Injectable 40 milliGRAM(s) IV Push daily    MEDICATIONS  (PRN):  dextrose 40% Gel 15 Gram(s) Oral once PRN Blood Glucose LESS THAN 70 milliGRAM(s)/deciliter  glucagon  Injectable 1 milliGRAM(s) IntraMuscular once PRN Glucose LESS THAN 70 milligrams/deciliter  HYDROmorphone  Injectable 0.5 milliGRAM(s) IV Push every 4 hours PRN Moderate Pain (4 - 6)  HYDROmorphone  Injectable 1 milliGRAM(s) IV Push every 4 hours PRN Severe Pain (7 - 10)                                              8.0                   Neurophils% (auto):   x      (09-03 @ 15:09):    9.28 )-----------(123          Lymphocytes% (auto):  x                                             24.6                   Eosinphils% (auto):   x        Manual%: Neutrophils x    ; Lymphocytes x    ; Eosinophils x    ; Bands%: x    ; Blasts x          09-03    135  |  99  |  39<H>  ----------------------------<  254<H>  4.6   |  26  |  4.84<H>    Ca    8.0<L>      03 Sep 2020 06:11  Phos  4.7     09-03  Mg     2.6     09-03    TPro  x   /  Alb  2.5<L>  /  TBili  x   /  DBili  x   /  AST  x   /  ALT  x   /  AlkPhos  x   09-03    ( 09-03 @ 17:40 )   PT: 11.3 sec;   INR: 0.94   aPTT: 36.6 sec    ABG - ( 01 Sep 2020 20:13 )  pH: 7.44  /  pCO2: 30    /  pO2: 207   / HCO3: 20    / Base Excess: -4.2  /  SaO2: 99    / Lactate: x          RECENT CULTURES:      General: AAOx3, arousable  Pulm: Nonlabored breathing, no respiratory distress. 8LPC cuff down, CPAP 30%, saturating well  Extrem: WWP, no edema. R thigh x2 JPs SS drainage. incision c/d/i on R thigh  HEENT: neck incision c/d/i, PEBBLES L neck SS, neck soft and flat. external flap doppler signal is strong for arterial. cook doppler for venous signal is wnl. intraoral incision line c/d/i, turgor wnl, pink. some slight ecchymosis on intraoral flap but pinprick wnl.       A/P: 64M w/ pmhx of ESRD, HTN, DM and T3N1M0 left tongue SCCa now s/p left hemiglossectomy, left modified radical neck dissection, right ALT free flap and tracheotomy. s/p L hemigloss, L ND, R ALT FF, NGT, trach 8LPC  - HD 9/4  - bolus feeds  - ASA 81/sqh  - OOBTC  - flap checks q1 nursing, resident q8  - avoid pressors, and maintain MAP 65+  - regular trach suctioning and cleaning of inner cannula  - monitor PEBBLES output  - appreciate SICU care    ----------------------------------------------  Jorge Mack MD  Resident  Department of Otolaryngology - Head and Neck Surgery  [facility]

## 2020-09-03 NOTE — PROGRESS NOTE ADULT - SUBJECTIVE AND OBJECTIVE BOX
S: BP high last night/early AM - given extra coreg.   No new issues/events overnight, no new med c/o    O: ICU Vital Signs Last 24 Hrs  T(F): 98.7 (09-03-20 @ 09:03), Max: 99.2 (09-03-20 @ 00:26)  HR: 83 (09-03-20 @ 11:00) (82 - 114)  BP: 166/71 (09-03-20 @ 04:00) (111/55 - 176/78)  BP(mean): 102 (09-03-20 @ 04:00) (77 - 112)  ABP: 176/48 (09-03-20 @ 11:00)  RR: 14 (09-03-20 @ 11:00) (14 - 20)  SpO2: 100% (09-03-20 @ 11:00) (95% - 100%)    PHYSICAL EXAM:   Neurological: AAOx3, CNII-XII intact,  strength 5/5 b/l  ENT: flap good signals and perfuson. trach no bleed. cuff deflated.   Cardiovascular: RRR  Respiratory: CTA  Gastrointestinal: soft, NT, ND, BS+  Extremities: warm, no dependent edema, right thigh flap site clean, PEBBLES serosang. no pus, no active bleeding.   Vascular: no cyanosis/erythema    LABS:   09-03    135  |  99  |  39<H>  ----------------------------<  254<H>  4.6   |  26  |  4.84<H>    Ca    8.0<L>      03 Sep 2020 06:11  Phos  4.7     09-03  Mg     2.6     09-03    TPro  x   /  Alb  2.5<L>  /  TBili  x   /  DBili  x   /  AST  x   /  ALT  x   /  AlkPhos  x   09-03  LIVER FUNCTIONS - ( 03 Sep 2020 06:11 )  Alb: 2.5 g/dL / Pro: x     / ALK PHOS: x     / ALT: x     / AST: x     / GGT: x                               6.7    9.06  )-----------( 129      ( 03 Sep 2020 07:11 )             20.4   PTT - ( 01 Sep 2020 20:13 )  PTT:37.2 sec  ABG - ( 01 Sep 2020 20:13 )  pH, Arterial: 7.44  pH, Blood: x     /  pCO2: 30    /  pO2: 207   / HCO3: 20    / Base Excess: -4.2  /  SaO2: 99              CAPILLARY BLOOD GLUCOSE      POCT Blood Glucose.: 188 mg/dL (03 Sep 2020 10:52)  POCT Blood Glucose.: 259 mg/dL (03 Sep 2020 06:02)  POCT Blood Glucose.: 84 mg/dL (03 Sep 2020 05:14)  POCT Blood Glucose.: 221 mg/dL (02 Sep 2020 21:39)  POCT Blood Glucose.: 137 mg/dL (02 Sep 2020 16:07)    MEDICATIONS  (STANDING):  amLODIPine   Tablet 10 milliGRAM(s) Oral once  aspirin  chewable 81 milliGRAM(s) Oral daily  carvedilol 25 milliGRAM(s) Oral every 12 hours  chlorhexidine 0.12% Liquid 15 milliLiter(s) Oral Mucosa every 12 hours  chlorhexidine 2% Cloths 1 Application(s) Topical daily  heparin   Injectable 5000 Unit(s) SubCutaneous every 8 hours  insulin glargine Injectable (LANTUS) 10 Unit(s) SubCutaneous once  insulin lispro (HumaLOG) corrective regimen sliding scale   SubCutaneous every 6 hours  levothyroxine 75 MICROGram(s) Oral daily  metroNIDAZOLE  IVPB 500 milliGRAM(s) IV Intermittent every 8 hours  pantoprazole  Injectable 40 milliGRAM(s) IV Push daily    MEDICATIONS  (PRN):  dextrose 40% Gel 15 Gram(s) Oral once PRN Blood Glucose LESS THAN 70 milliGRAM(s)/deciliter  glucagon  Injectable 1 milliGRAM(s) IntraMuscular once PRN Glucose LESS THAN 70 milligrams/deciliter  HYDROmorphone  Injectable 0.5 milliGRAM(s) IV Push every 4 hours PRN Moderate Pain (4 - 6)  HYDROmorphone  Injectable 1 milliGRAM(s) IV Push every 4 hours PRN Severe Pain (7 - 10)      Andres:	  [x ] None	[ ] Daily Andres Order Placed	   Indication:	  [ ] Strict I and O's    [ ] Obstruction     [ ] Incontinence + Stage 3 or 4 Decubitus  Central Line:  [x ] None	   [ ]  Medication / TPN Administration     [ ] No Peripheral IV

## 2020-09-04 LAB
ALBUMIN SERPL ELPH-MCNC: 2.6 G/DL — LOW (ref 3.3–5)
ANION GAP SERPL CALC-SCNC: 12 MMOL/L — SIGNIFICANT CHANGE UP (ref 5–17)
BUN SERPL-MCNC: 54 MG/DL — HIGH (ref 7–23)
CALCIUM SERPL-MCNC: 8.4 MG/DL — SIGNIFICANT CHANGE UP (ref 8.4–10.5)
CHLORIDE SERPL-SCNC: 101 MMOL/L — SIGNIFICANT CHANGE UP (ref 96–108)
CO2 SERPL-SCNC: 26 MMOL/L — SIGNIFICANT CHANGE UP (ref 22–31)
CREAT SERPL-MCNC: 6.24 MG/DL — HIGH (ref 0.5–1.3)
GLUCOSE BLDC GLUCOMTR-MCNC: 129 MG/DL — HIGH (ref 70–99)
GLUCOSE BLDC GLUCOMTR-MCNC: 165 MG/DL — HIGH (ref 70–99)
GLUCOSE BLDC GLUCOMTR-MCNC: 187 MG/DL — HIGH (ref 70–99)
GLUCOSE BLDC GLUCOMTR-MCNC: 229 MG/DL — HIGH (ref 70–99)
GLUCOSE SERPL-MCNC: 131 MG/DL — HIGH (ref 70–99)
HCT VFR BLD CALC: 22.8 % — LOW (ref 39–50)
HCT VFR BLD CALC: 25.9 % — LOW (ref 39–50)
HGB BLD-MCNC: 7.5 G/DL — LOW (ref 13–17)
HGB BLD-MCNC: 8.5 G/DL — LOW (ref 13–17)
MAGNESIUM SERPL-MCNC: 2.7 MG/DL — HIGH (ref 1.6–2.6)
MCHC RBC-ENTMCNC: 28.8 PG — SIGNIFICANT CHANGE UP (ref 27–34)
MCHC RBC-ENTMCNC: 29.2 PG — SIGNIFICANT CHANGE UP (ref 27–34)
MCHC RBC-ENTMCNC: 32.8 GM/DL — SIGNIFICANT CHANGE UP (ref 32–36)
MCHC RBC-ENTMCNC: 32.9 GM/DL — SIGNIFICANT CHANGE UP (ref 32–36)
MCV RBC AUTO: 87.8 FL — SIGNIFICANT CHANGE UP (ref 80–100)
MCV RBC AUTO: 88.7 FL — SIGNIFICANT CHANGE UP (ref 80–100)
NRBC # BLD: 0 /100 WBCS — SIGNIFICANT CHANGE UP (ref 0–0)
NRBC # BLD: 0 /100 WBCS — SIGNIFICANT CHANGE UP (ref 0–0)
PHOSPHATE SERPL-MCNC: 3.9 MG/DL — SIGNIFICANT CHANGE UP (ref 2.5–4.5)
PLATELET # BLD AUTO: 125 K/UL — LOW (ref 150–400)
PLATELET # BLD AUTO: 151 K/UL — SIGNIFICANT CHANGE UP (ref 150–400)
POTASSIUM SERPL-MCNC: 4.1 MMOL/L — SIGNIFICANT CHANGE UP (ref 3.5–5.3)
POTASSIUM SERPL-SCNC: 4.1 MMOL/L — SIGNIFICANT CHANGE UP (ref 3.5–5.3)
RBC # BLD: 2.57 M/UL — LOW (ref 4.2–5.8)
RBC # BLD: 2.95 M/UL — LOW (ref 4.2–5.8)
RBC # FLD: 15.7 % — HIGH (ref 10.3–14.5)
RBC # FLD: 15.9 % — HIGH (ref 10.3–14.5)
SODIUM SERPL-SCNC: 139 MMOL/L — SIGNIFICANT CHANGE UP (ref 135–145)
SURGICAL PATHOLOGY STUDY: SIGNIFICANT CHANGE UP
TSH SERPL-MCNC: 0.42 UIU/ML — SIGNIFICANT CHANGE UP (ref 0.35–4.94)
WBC # BLD: 8.58 K/UL — SIGNIFICANT CHANGE UP (ref 3.8–10.5)
WBC # BLD: 9.37 K/UL — SIGNIFICANT CHANGE UP (ref 3.8–10.5)
WBC # FLD AUTO: 8.58 K/UL — SIGNIFICANT CHANGE UP (ref 3.8–10.5)
WBC # FLD AUTO: 9.37 K/UL — SIGNIFICANT CHANGE UP (ref 3.8–10.5)

## 2020-09-04 PROCEDURE — 99233 SBSQ HOSP IP/OBS HIGH 50: CPT | Mod: GC

## 2020-09-04 PROCEDURE — 90935 HEMODIALYSIS ONE EVALUATION: CPT

## 2020-09-04 PROCEDURE — 88321 CONSLTJ&REPRT SLD PREP ELSWR: CPT

## 2020-09-04 RX ORDER — ALTEPLASE 100 MG
2 KIT INTRAVENOUS ONCE
Refills: 0 | Status: COMPLETED | OUTPATIENT
Start: 2020-09-04 | End: 2020-09-04

## 2020-09-04 RX ORDER — ALBUMIN HUMAN 25 %
50 VIAL (ML) INTRAVENOUS ONCE
Refills: 0 | Status: COMPLETED | OUTPATIENT
Start: 2020-09-04 | End: 2020-09-04

## 2020-09-04 RX ORDER — TAMSULOSIN HYDROCHLORIDE 0.4 MG/1
0.4 CAPSULE ORAL AT BEDTIME
Refills: 0 | Status: DISCONTINUED | OUTPATIENT
Start: 2020-09-04 | End: 2020-09-09

## 2020-09-04 RX ADMIN — Medication 100 MILLIGRAM(S): at 06:12

## 2020-09-04 RX ADMIN — PANTOPRAZOLE SODIUM 40 MILLIGRAM(S): 20 TABLET, DELAYED RELEASE ORAL at 11:20

## 2020-09-04 RX ADMIN — CHLORHEXIDINE GLUCONATE 15 MILLILITER(S): 213 SOLUTION TOPICAL at 18:41

## 2020-09-04 RX ADMIN — Medication 100 MILLIGRAM(S): at 18:40

## 2020-09-04 RX ADMIN — CARVEDILOL PHOSPHATE 25 MILLIGRAM(S): 80 CAPSULE, EXTENDED RELEASE ORAL at 06:12

## 2020-09-04 RX ADMIN — Medication 75 MICROGRAM(S): at 06:12

## 2020-09-04 RX ADMIN — CARVEDILOL PHOSPHATE 25 MILLIGRAM(S): 80 CAPSULE, EXTENDED RELEASE ORAL at 18:40

## 2020-09-04 RX ADMIN — HEPARIN SODIUM 5000 UNIT(S): 5000 INJECTION INTRAVENOUS; SUBCUTANEOUS at 08:38

## 2020-09-04 RX ADMIN — Medication 100 MILLIGRAM(S): at 14:13

## 2020-09-04 RX ADMIN — HEPARIN SODIUM 5000 UNIT(S): 5000 INJECTION INTRAVENOUS; SUBCUTANEOUS at 16:09

## 2020-09-04 RX ADMIN — CHLORHEXIDINE GLUCONATE 15 MILLILITER(S): 213 SOLUTION TOPICAL at 06:12

## 2020-09-04 RX ADMIN — HEPARIN SODIUM 5000 UNIT(S): 5000 INJECTION INTRAVENOUS; SUBCUTANEOUS at 00:00

## 2020-09-04 RX ADMIN — INSULIN GLARGINE 10 UNIT(S): 100 INJECTION, SOLUTION SUBCUTANEOUS at 09:31

## 2020-09-04 RX ADMIN — AMLODIPINE BESYLATE 10 MILLIGRAM(S): 2.5 TABLET ORAL at 06:12

## 2020-09-04 RX ADMIN — Medication 4: at 16:23

## 2020-09-04 RX ADMIN — Medication 81 MILLIGRAM(S): at 11:20

## 2020-09-04 RX ADMIN — CHLORHEXIDINE GLUCONATE 1 APPLICATION(S): 213 SOLUTION TOPICAL at 11:21

## 2020-09-04 RX ADMIN — Medication 2: at 11:19

## 2020-09-04 NOTE — BRIEF OPERATIVE NOTE - NSICDXBRIEFPREOP_GEN_ALL_CORE_FT
PRE-OP DIAGNOSIS:  Tongue cancer 02-Sep-2020 00:33:22  Jass Hodge
PRE-OP DIAGNOSIS:  Tongue cancer 02-Sep-2020 00:33:22  Jass Hodge

## 2020-09-04 NOTE — CONSULT NOTE ADULT - SUBJECTIVE AND OBJECTIVE BOX
HPI:  64M w/ pmhx of ESRD on Dialysis (TTS), HTN, DM and T3N1M0 left tongue SCCa now s/p left hemiglossectomy, left modified radical neck dissection, right ALT free flap and tracheotomy on 9/1. Patient is recovering in SICU and is receiving tube feeds via Dobhoff. General Surgery (Dr. Fagan) was consulted for placement of G-tube for feeds. Patient has a history of open appendectomy, and no other abdominal surgeries in the past are noted.      PAST MEDICAL & SURGICAL HISTORY:  Hypothyroidism  Lower extremity edema  Hyperlipidemia  BPH (benign prostatic hyperplasia)  Type II diabetes mellitus  Essential hypertension  Chronic kidney disease (CKD), stage V: Hemodialysis  AV fistula: left UE  S/P appendectomy      MEDICATIONS  (STANDING):  amLODIPine   Tablet 10 milliGRAM(s) Oral daily  aspirin  chewable 81 milliGRAM(s) Oral daily  carvedilol 25 milliGRAM(s) Oral every 12 hours  ceFAZolin   IVPB 1000 milliGRAM(s) IV Intermittent every 24 hours  chlorhexidine 0.12% Liquid 15 milliLiter(s) Oral Mucosa every 12 hours  chlorhexidine 2% Cloths 1 Application(s) Topical daily  dextrose 5%. 1000 milliLiter(s) (50 mL/Hr) IV Continuous <Continuous>  dextrose 50% Injectable 12.5 Gram(s) IV Push once  dextrose 50% Injectable 25 Gram(s) IV Push once  dextrose 50% Injectable 25 Gram(s) IV Push once  heparin   Injectable 5000 Unit(s) SubCutaneous every 8 hours  insulin glargine Injectable (LANTUS) 10 Unit(s) SubCutaneous every morning  insulin lispro (HumaLOG) corrective regimen sliding scale   SubCutaneous Before meals and at bedtime  levothyroxine 75 MICROGram(s) Oral daily  metroNIDAZOLE  IVPB 500 milliGRAM(s) IV Intermittent every 8 hours  pantoprazole  Injectable 40 milliGRAM(s) IV Push daily    MEDICATIONS  (PRN):  dextrose 40% Gel 15 Gram(s) Oral once PRN Blood Glucose LESS THAN 70 milliGRAM(s)/deciliter  glucagon  Injectable 1 milliGRAM(s) IntraMuscular once PRN Glucose LESS THAN 70 milligrams/deciliter  HYDROmorphone  Injectable 0.5 milliGRAM(s) IV Push every 4 hours PRN Moderate Pain (4 - 6)  HYDROmorphone  Injectable 1 milliGRAM(s) IV Push every 4 hours PRN Severe Pain (7 - 10)      Allergies    No Known Allergies    Intolerances        Physical Exam:  General: NAD, resting comfortably  HEENT: NC/AT, EOMI, normal hearing, s/p hemiglossectomy, and neck dissection  Pulmonary: On tracheostomy with supplemental O2  Cardiovascular: NSR  Abdominal: soft, ND/NT, no organomegaly, well healed RLQ incision  Extremities: WWP,  Neuro: A/O x 3, CNs II-XII grossly intact  Pulses: palpable distal pulses    Vital Signs Last 24 Hrs  T(C): 37 (04 Sep 2020 14:28), Max: 37.4 (03 Sep 2020 22:11)  T(F): 98.6 (04 Sep 2020 14:28), Max: 99.4 (03 Sep 2020 22:11)  HR: 83 (04 Sep 2020 14:00) (71 - 101)  BP: 147/67 (04 Sep 2020 14:00) (121/60 - 147/67)  BP(mean): 97 (04 Sep 2020 14:00) (84 - 97)  RR: 13 (04 Sep 2020 14:00) (7 - 26)  SpO2: 97% (04 Sep 2020 14:00) (95% - 100%)    I&O's Summary    03 Sep 2020 07:01  -  04 Sep 2020 07:00  --------------------------------------------------------  IN: 1010 mL / OUT: 140 mL / NET: 870 mL    04 Sep 2020 07:01  -  04 Sep 2020 16:56  --------------------------------------------------------  IN: 570 mL / OUT: 2580 mL / NET: -2010 mL        LABS:                        8.5    9.37  )-----------( 151      ( 04 Sep 2020 14:17 )             25.9     09-04    139  |  101  |  54<H>  ----------------------------<  131<H>  4.1   |  26  |  6.24<H>    Ca    8.4      04 Sep 2020 06:01  Phos  3.9     09-04  Mg     2.7     09-04    TPro  x   /  Alb  2.6<L>  /  TBili  x   /  DBili  x   /  AST  x   /  ALT  x   /  AlkPhos  x   09-04    PT/INR - ( 03 Sep 2020 17:40 )   PT: 11.3 sec;   INR: 0.94          PTT - ( 03 Sep 2020 17:40 )  PTT:36.6 sec    CAPILLARY BLOOD GLUCOSE      POCT Blood Glucose.: 229 mg/dL (04 Sep 2020 16:12)  POCT Blood Glucose.: 165 mg/dL (04 Sep 2020 10:56)  POCT Blood Glucose.: 187 mg/dL (04 Sep 2020 09:34)  POCT Blood Glucose.: 129 mg/dL (04 Sep 2020 05:49)  POCT Blood Glucose.: 196 mg/dL (03 Sep 2020 21:28)    LIVER FUNCTIONS - ( 04 Sep 2020 06:01 )  Alb: 2.6 g/dL / Pro: x     / ALK PHOS: x     / ALT: x     / AST: x     / GGT: x                 Plan:  64M w/ pmhx of ESRD on Dialysis (TTS), HTN, DM and T3N1M0 left tongue SCCa now s/p left hemiglossectomy, left modified radical neck dissection, right ALT free flap and tracheotomy on 9/1. Patient is recovering in SICU and is receiving tube feeds via Dobhoff. General Surgery (Dr. Fagan) was consulted for placement of G-tube for feeds. Patient is still in SICU. Patient can't have an EGD for PEG tube. G-tube placement by General Surgery requires General anesthesia and is a major procedure for this patient at this time    - Please consult IR for G-tube placement as it is a least invasive procedure    Plan discussed with Dr. Fagan

## 2020-09-04 NOTE — PROGRESS NOTE ADULT - ATTENDING COMMENTS
ELTON Ramírez has acted as my scribe. Pt for hemodialysis today and will repeat Hb post HD as drop may be dilutional. Add Hydralazine home doae post HD for continued BP control. continue enteral feeds and lantus.

## 2020-09-04 NOTE — PROGRESS NOTE ADULT - ASSESSMENT
64M w/ left tongue SCC now s/p left hemiglossectomy, left modified radical neck dissection, right ALT free flap and tracheotomy.  Pt tolerated procedure well and was transferred to the SICU in stable condition.    Neuro: dilaudid PRN  HEENT: Neutral to slightly right neck position Flap checks q1h, ASA 81qd.   CV: stable.  coreg 25bid, norvasc 10qd,   Pulm: Trach collar,   GI: NPO Doboff, TF nepro bolus. Protonix   : ESRD on HD,   ID: ppx: Ancef( 9/1-) Flagyl( 9/1 - )   Endo: ISS Levothyroxine, lantus 10qAM.   ppx: SCD  SQH.   Lines: ROSA M Heath( 9/1-4)   Wounds: PEBBLES x2 in Rt thigh, &x1  Left Neck PEBBLES  PT/OT: ordered.

## 2020-09-04 NOTE — PHYSICAL THERAPY INITIAL EVALUATION ADULT - MANUAL MUSCLE TESTING RESULTS, REHAB EVAL
Except RLE limited 2/2 flap to partial against gravity with ability to bear weight/no strength deficits were identified

## 2020-09-04 NOTE — PHYSICAL THERAPY INITIAL EVALUATION ADULT - ACTIVE RANGE OF MOTION EXAMINATION, REHAB EVAL
bilateral upper extremity Active ROM was WFL (within functional limits)/Left LE Active ROM was WFL (within functional limits)/LLE limited 2/2 flap

## 2020-09-04 NOTE — PROGRESS NOTE ADULT - SUBJECTIVE AND OBJECTIVE BOX
S: Pt reports pain is well controlled with pain medicine. He is ready to get out of bed today. No HA/CP/SOB.     Vitals: ICU Vital Signs Last 24 Hrs  T(C): 36.7 (04 Sep 2020 05:54), Max: 37.4 (03 Sep 2020 22:11)  T(F): 98.1 (04 Sep 2020 05:54), Max: 99.4 (03 Sep 2020 22:11)  HR: 80 (04 Sep 2020 07:00) (71 - 106)  BP: --  BP(mean): --  ABP: 153/44 (04 Sep 2020 07:00) (81/72 - 183/51)  ABP(mean): 78 (04 Sep 2020 07:00) (77 - 131)  RR: 14 (04 Sep 2020 07:00) (7 - 26)  SpO2: 100% (04 Sep 2020 07:00) (95% - 100%)            I&O:  I&O's Detail    03 Sep 2020 07:01  -  04 Sep 2020 07:00  --------------------------------------------------------  IN:    Nepro with Carb Steady: 510 mL    Packed Red Blood Cells: 500 mL  Total IN: 1010 mL    OUT:    Drain: 25 mL    Drain: 35 mL    Drain: 80 mL  Total OUT: 140 mL    Total NET: 870 mL            CAPILLARY BLOOD GLUCOSE      POCT Blood Glucose.: 129 mg/dL (04 Sep 2020 05:49)  POCT Blood Glucose.: 196 mg/dL (03 Sep 2020 21:28)  POCT Blood Glucose.: 139 mg/dL (03 Sep 2020 16:35)  POCT Blood Glucose.: 226 mg/dL (03 Sep 2020 12:38)  POCT Blood Glucose.: 236 mg/dL (03 Sep 2020 12:34)  POCT Blood Glucose.: 188 mg/dL (03 Sep 2020 10:52)      Labs:                         7.5    8.58  )-----------( 125      ( 04 Sep 2020 06:01 )             22.8   09-04    139  |  101  |  54<H>  ----------------------------<  131<H>  4.1   |  26  |  6.24<H>    Ca    8.4      04 Sep 2020 06:01  Phos  3.9     09-04  Mg     2.7     09-04    TPro  x   /  Alb  2.5<L>  /  TBili  x   /  DBili  x   /  AST  x   /  ALT  x   /  AlkPhos  x   09-03  PT/INR - ( 03 Sep 2020 17:40 )   PT: 11.3 sec;   INR: 0.94          PTT - ( 03 Sep 2020 17:40 )  PTT:36.6 sec      Electrolytes repleated to goals as follows: Potassium 4, Phosphorus 3 and Magnesium 2 where appropriate and necessary    Exam:  Neuro: A&Ox3 by yes/no, NAD, grossly neuro intact  HEENT: PERRL < EOMI, MMM, tongue with visible edema, no ecchymosis  Neck: Supple, R sided incision with sutures intact and no visible drainage, PEBBLES with trace SS drainage  CV: RRR, no MRGs  Pulm: CTAB, no wheezes, rales, or rhonchi  Abd: BS (+), Soft, NT, ND  Ext: No edema peripherally or centrally, R thigh with incision and sutures intact, no visible drainage, PEBBLES with trace SS drainge  Vasc: Extremities warm to palpation, 2+ pulses - radial and PT  MSK: no joint swelling  Skin: no rash  Psych: affect appropriate

## 2020-09-04 NOTE — PHYSICAL THERAPY INITIAL EVALUATION ADULT - PERTINENT HX OF CURRENT PROBLEM, REHAB EVAL
64 year old male left tongue SCCa now s/p left hemiglossectomy, left modified radical neck dissection, right ALT free flap and tracheotomy.

## 2020-09-04 NOTE — PHYSICAL THERAPY INITIAL EVALUATION ADULT - GENERAL OBSERVATIONS, REHAB EVAL
Patient received in semi-minor position, no apparent distress, +PEBBLES x 3 self suction, +hep lock, +Left forearm fistual, +trach collar.

## 2020-09-04 NOTE — PROGRESS NOTE ADULT - ASSESSMENT
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 Nephrology consulted for dialysis arrangement.      # ESRD on HD TTS via LUE AVF   - getting HD today  - will consider session 9/5 as per regular schedule and for volume optimization  - daily weights  - renal diet  - strict I/O     # HTN  - UF with HD    # Renal bone disease  - phos, calcium noted, acceptable     # Anemia  - s/p hemotransfusions  - monitor H/H  - no DANIEL in setting of malignancy

## 2020-09-04 NOTE — PROGRESS NOTE ADULT - SUBJECTIVE AND OBJECTIVE BOX
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 s/p L hemigloss, L ND, R ALT FF, NGT, trach 8LPC     Hospital Course:  9/2: weaned off pressors in SICU. soft pressures s/p bolus of albumin and PRBC 1 unit each. otherwise stable. no sedation and no pressors during am rounds. weaned to CPAP 30%. flap doing well with strong signal. s/p 300 rectal ASA. on abx  9/3: NAEON, flap doing well. pressures stable with MAPs >65. hgb 6.7, s/p 1u PRBC > r/p hgb 8. HD tomorrow per renal. PT unable to see today b/c anemia  9/4: NAEON. flap doing well. pressures stable with MAPs >65. HD today per renal. PT today    Vital Signs Last 24 Hrs  T(C): 36.7 (04 Sep 2020 05:54), Max: 37.4 (03 Sep 2020 22:11)  T(F): 98.1 (04 Sep 2020 05:54), Max: 99.4 (03 Sep 2020 22:11)  HR: 71 (04 Sep 2020 06:00) (71 - 106)  BP: --  BP(mean): --  RR: 14 (04 Sep 2020 06:00) (7 - 26)  SpO2: 100% (04 Sep 2020 06:00) (95% - 100%)    MEDICATIONS  (STANDING):  aspirin  chewable 81 milliGRAM(s) Oral daily  carvedilol 25 milliGRAM(s) Oral every 12 hours  chlorhexidine 0.12% Liquid 15 milliLiter(s) Oral Mucosa every 12 hours  chlorhexidine 2% Cloths 1 Application(s) Topical daily  dextrose 5%. 1000 milliLiter(s) (50 mL/Hr) IV Continuous <Continuous>  dextrose 50% Injectable 12.5 Gram(s) IV Push once  dextrose 50% Injectable 25 Gram(s) IV Push once  dextrose 50% Injectable 25 Gram(s) IV Push once  heparin   Injectable 5000 Unit(s) SubCutaneous every 8 hours  insulin lispro (HumaLOG) corrective regimen sliding scale   SubCutaneous Before meals and at bedtime  levothyroxine 75 MICROGram(s) Oral daily  metroNIDAZOLE  IVPB 500 milliGRAM(s) IV Intermittent every 8 hours  pantoprazole  Injectable 40 milliGRAM(s) IV Push daily    MEDICATIONS  (PRN):  dextrose 40% Gel 15 Gram(s) Oral once PRN Blood Glucose LESS THAN 70 milliGRAM(s)/deciliter  glucagon  Injectable 1 milliGRAM(s) IntraMuscular once PRN Glucose LESS THAN 70 milligrams/deciliter  HYDROmorphone  Injectable 0.5 milliGRAM(s) IV Push every 4 hours PRN Moderate Pain (4 - 6)  HYDROmorphone  Injectable 1 milliGRAM(s) IV Push every 4 hours PRN Severe Pain (7 - 10)                                              8.0                   Neurophils% (auto):   x      (09-03 @ 15:09):    9.28 )-----------(123          Lymphocytes% (auto):  x                                             24.6                   Eosinphils% (auto):   x        Manual%: Neutrophils x    ; Lymphocytes x    ; Eosinophils x    ; Bands%: x    ; Blasts x          09-03    135  |  99  |  39<H>  ----------------------------<  254<H>  4.6   |  26  |  4.84<H>    Ca    8.0<L>      03 Sep 2020 06:11  Phos  4.7     09-03  Mg     2.6     09-03    TPro  x   /  Alb  2.5<L>  /  TBili  x   /  DBili  x   /  AST  x   /  ALT  x   /  AlkPhos  x   09-03    ( 09-03 @ 17:40 )   PT: 11.3 sec;   INR: 0.94   aPTT: 36.6 sec    ABG - ( 01 Sep 2020 20:13 )  pH: 7.44  /  pCO2: 30    /  pO2: 207   / HCO3: 20    / Base Excess: -4.2  /  SaO2: 99    / Lactate: x          RECENT CULTURES:      General: AAOx3, arousable  Pulm: Nonlabored breathing, no respiratory distress. 8LPC cuff down, CPAP 30%, saturating well  Extrem: WWP, no edema. R thigh x2 JPs SS drainage. incision c/d/i on R thigh  HEENT: neck incision c/d/i, PEBBLES L neck SS, neck soft and flat. external flap doppler signal is strong for arterial. cook doppler for venous signal is wnl. intraoral incision line c/d/i, turgor wnl, pink. some slight ecchymosis on intraoral flap but pinprick wnl.       A/P: 64M w/ pmhx of ESRD, HTN, DM and T3N1M0 left tongue SCCa now s/p left hemiglossectomy, left modified radical neck dissection, right ALT free flap and tracheotomy. s/p L hemigloss, L ND, R ALT FF, NGT, trach 8LPC  - HD 9/4  - bolus feeds  - ASA 81/sqh  - OOBTC  - flap checks q1 nursing, resident q8  - avoid pressors, and maintain MAP 65+  - regular trach suctioning and cleaning of inner cannula  - monitor PEBBLES output  - appreciate SICU care    ----------------------------------------------  Jorge Mack MD  Resident  Department of Otolaryngology - Head and Neck Surgery  [facility]

## 2020-09-04 NOTE — BRIEF OPERATIVE NOTE - OPERATION/FINDINGS
left neck incision opened.  arterial and venous anastomosis found to be intact with good flow on doppler.  ALT flap trimmed to bleeding tissue and anterior mouth packed with gauze.  left neck incision closed with interrupted nylon suture
large left tongue lesion, excised.  left MRND performed.  pt trached with 8 cuffed ETT.  right ALT free flap harvested for reconstruction.

## 2020-09-04 NOTE — PROGRESS NOTE ADULT - SUBJECTIVE AND OBJECTIVE BOX
Patient was seen and evaluated on dialysis.   Feels better today  No acute events overnight      Vitals:  HR: 83 (09-04-20 @ 14:00)  BP: 147/67 (09-04-20 @ 14:00)  Wt(kg): 75.6      PHYSICAL EXAM:  GENERAL: alert, no acute distress at present  NECK: No JVD, trach in place, FiO2 at 40%  CHEST/LUNG: equal breath sounds bilaterally  HEART: normal S1S2, RRR  ABDOMEN: Soft, Nontender, +BS, No flank tenderness  EXTREMITIES: No LE edema bilateral  Neurology: AAOx3, no focal neurological deficit  SKIN: No rashes  ACCESS: LUE AVF, good thrill and bruit appreciated      Labs:  09-04    139  |  101  |  54<H>  ----------------------------<  131<H>  4.1   |  26  |  6.24<H>    Ca    8.4      04 Sep 2020 06:01  Phos  3.9     09-04  Mg     2.7     09-04    TPro  x   /  Alb  2.6<L>  /  TBili  x   /  DBili  x   /  AST  x   /  ALT  x   /  AlkPhos  x   09-04    Continue dialysis:   Dialyzer:   180    QB: 400  K bath: 2  Goal UF:   2.0    L   over      180         min  Patient is tolerating the procedure well.   Continue full treatment as prescribed.

## 2020-09-04 NOTE — BRIEF OPERATIVE NOTE - NSICDXBRIEFPROCEDURE_GEN_ALL_CORE_FT
PROCEDURES:  Exploration of wound of neck 04-Sep-2020 23:40:56  Jass Hodge  Left hemiglossectomy 02-Sep-2020 00:33:13  Jass Hodge
PROCEDURES:  Left hemiglossectomy 02-Sep-2020 00:33:13  Jass Hodge

## 2020-09-04 NOTE — PHYSICAL THERAPY INITIAL EVALUATION ADULT - GAIT DEVIATIONS NOTED, PT EVAL
decreased diana/decreased step length/decreased stride length/decreased weight-shifting ability/Right antalgic gait

## 2020-09-04 NOTE — BRIEF OPERATIVE NOTE - NSICDXBRIEFPOSTOP_GEN_ALL_CORE_FT
POST-OP DIAGNOSIS:  Squamous cell cancer of tongue 02-Sep-2020 00:33:32  Jass Hodge
POST-OP DIAGNOSIS:  Squamous cell cancer of tongue 02-Sep-2020 00:33:32  Jass Hodge

## 2020-09-05 LAB
ALBUMIN SERPL ELPH-MCNC: 2.3 G/DL — LOW (ref 3.3–5)
ALP SERPL-CCNC: 55 U/L — SIGNIFICANT CHANGE UP (ref 40–120)
ALT FLD-CCNC: <5 U/L — LOW (ref 10–45)
ANION GAP SERPL CALC-SCNC: 10 MMOL/L — SIGNIFICANT CHANGE UP (ref 5–17)
ANION GAP SERPL CALC-SCNC: 11 MMOL/L — SIGNIFICANT CHANGE UP (ref 5–17)
AST SERPL-CCNC: 18 U/L — SIGNIFICANT CHANGE UP (ref 10–40)
BASE EXCESS BLDA CALC-SCNC: 6.5 MMOL/L — HIGH (ref -2–3)
BILIRUB SERPL-MCNC: 0.2 MG/DL — SIGNIFICANT CHANGE UP (ref 0.2–1.2)
BLD GP AB SCN SERPL QL: NEGATIVE — SIGNIFICANT CHANGE UP
BUN SERPL-MCNC: 44 MG/DL — HIGH (ref 7–23)
BUN SERPL-MCNC: 46 MG/DL — HIGH (ref 7–23)
CALCIUM SERPL-MCNC: 8.2 MG/DL — LOW (ref 8.4–10.5)
CALCIUM SERPL-MCNC: 8.2 MG/DL — LOW (ref 8.4–10.5)
CHLORIDE SERPL-SCNC: 98 MMOL/L — SIGNIFICANT CHANGE UP (ref 96–108)
CHLORIDE SERPL-SCNC: 98 MMOL/L — SIGNIFICANT CHANGE UP (ref 96–108)
CO2 SERPL-SCNC: 28 MMOL/L — SIGNIFICANT CHANGE UP (ref 22–31)
CO2 SERPL-SCNC: 29 MMOL/L — SIGNIFICANT CHANGE UP (ref 22–31)
CREAT SERPL-MCNC: 4.67 MG/DL — HIGH (ref 0.5–1.3)
CREAT SERPL-MCNC: 4.88 MG/DL — HIGH (ref 0.5–1.3)
GAS PNL BLDA: SIGNIFICANT CHANGE UP
GLUCOSE BLDC GLUCOMTR-MCNC: 109 MG/DL — HIGH (ref 70–99)
GLUCOSE BLDC GLUCOMTR-MCNC: 150 MG/DL — HIGH (ref 70–99)
GLUCOSE BLDC GLUCOMTR-MCNC: 194 MG/DL — HIGH (ref 70–99)
GLUCOSE BLDC GLUCOMTR-MCNC: 203 MG/DL — HIGH (ref 70–99)
GLUCOSE BLDC GLUCOMTR-MCNC: 216 MG/DL — HIGH (ref 70–99)
GLUCOSE SERPL-MCNC: 106 MG/DL — HIGH (ref 70–99)
GLUCOSE SERPL-MCNC: 142 MG/DL — HIGH (ref 70–99)
HCO3 BLDA-SCNC: 29 MMOL/L — HIGH (ref 21–28)
HCT VFR BLD CALC: 20.6 % — CRITICAL LOW (ref 39–50)
HCT VFR BLD CALC: 22.7 % — LOW (ref 39–50)
HCT VFR BLD CALC: 23 % — LOW (ref 39–50)
HGB BLD-MCNC: 6.8 G/DL — CRITICAL LOW (ref 13–17)
HGB BLD-MCNC: 7.5 G/DL — LOW (ref 13–17)
HGB BLD-MCNC: 7.6 G/DL — LOW (ref 13–17)
LACTATE SERPL-SCNC: 0.7 MMOL/L — SIGNIFICANT CHANGE UP (ref 0.5–2)
MAGNESIUM SERPL-MCNC: 2.3 MG/DL — SIGNIFICANT CHANGE UP (ref 1.6–2.6)
MAGNESIUM SERPL-MCNC: 2.4 MG/DL — SIGNIFICANT CHANGE UP (ref 1.6–2.6)
MCHC RBC-ENTMCNC: 28.5 PG — SIGNIFICANT CHANGE UP (ref 27–34)
MCHC RBC-ENTMCNC: 28.8 PG — SIGNIFICANT CHANGE UP (ref 27–34)
MCHC RBC-ENTMCNC: 29.1 PG — SIGNIFICANT CHANGE UP (ref 27–34)
MCHC RBC-ENTMCNC: 33 GM/DL — SIGNIFICANT CHANGE UP (ref 32–36)
MCV RBC AUTO: 86.1 FL — SIGNIFICANT CHANGE UP (ref 80–100)
MCV RBC AUTO: 87.3 FL — SIGNIFICANT CHANGE UP (ref 80–100)
MCV RBC AUTO: 88 FL — SIGNIFICANT CHANGE UP (ref 80–100)
NRBC # BLD: 0 /100 WBCS — SIGNIFICANT CHANGE UP (ref 0–0)
PCO2 BLDA: 35 MMHG — SIGNIFICANT CHANGE UP (ref 35–48)
PH BLDA: 7.55 — HIGH (ref 7.35–7.45)
PHOSPHATE SERPL-MCNC: 2.8 MG/DL — SIGNIFICANT CHANGE UP (ref 2.5–4.5)
PHOSPHATE SERPL-MCNC: 2.9 MG/DL — SIGNIFICANT CHANGE UP (ref 2.5–4.5)
PLATELET # BLD AUTO: 140 K/UL — LOW (ref 150–400)
PLATELET # BLD AUTO: 145 K/UL — LOW (ref 150–400)
PLATELET # BLD AUTO: 147 K/UL — LOW (ref 150–400)
PO2 BLDA: 280 MMHG — HIGH (ref 83–108)
POTASSIUM SERPL-MCNC: 3.6 MMOL/L — SIGNIFICANT CHANGE UP (ref 3.5–5.3)
POTASSIUM SERPL-MCNC: 3.7 MMOL/L — SIGNIFICANT CHANGE UP (ref 3.5–5.3)
POTASSIUM SERPL-SCNC: 3.6 MMOL/L — SIGNIFICANT CHANGE UP (ref 3.5–5.3)
POTASSIUM SERPL-SCNC: 3.7 MMOL/L — SIGNIFICANT CHANGE UP (ref 3.5–5.3)
PROT SERPL-MCNC: 5.1 G/DL — LOW (ref 6–8.3)
RBC # BLD: 2.34 M/UL — LOW (ref 4.2–5.8)
RBC # BLD: 2.6 M/UL — LOW (ref 4.2–5.8)
RBC # BLD: 2.67 M/UL — LOW (ref 4.2–5.8)
RBC # FLD: 15.5 % — HIGH (ref 10.3–14.5)
RBC # FLD: 15.9 % — HIGH (ref 10.3–14.5)
RBC # FLD: 16 % — HIGH (ref 10.3–14.5)
RH IG SCN BLD-IMP: POSITIVE — SIGNIFICANT CHANGE UP
SAO2 % BLDA: 100 % — SIGNIFICANT CHANGE UP (ref 95–100)
SODIUM SERPL-SCNC: 137 MMOL/L — SIGNIFICANT CHANGE UP (ref 135–145)
SODIUM SERPL-SCNC: 137 MMOL/L — SIGNIFICANT CHANGE UP (ref 135–145)
WBC # BLD: 6.94 K/UL — SIGNIFICANT CHANGE UP (ref 3.8–10.5)
WBC # BLD: 7.11 K/UL — SIGNIFICANT CHANGE UP (ref 3.8–10.5)
WBC # BLD: 7.17 K/UL — SIGNIFICANT CHANGE UP (ref 3.8–10.5)
WBC # FLD AUTO: 6.94 K/UL — SIGNIFICANT CHANGE UP (ref 3.8–10.5)
WBC # FLD AUTO: 7.11 K/UL — SIGNIFICANT CHANGE UP (ref 3.8–10.5)
WBC # FLD AUTO: 7.17 K/UL — SIGNIFICANT CHANGE UP (ref 3.8–10.5)

## 2020-09-05 PROCEDURE — 99232 SBSQ HOSP IP/OBS MODERATE 35: CPT | Mod: GC

## 2020-09-05 PROCEDURE — 99291 CRITICAL CARE FIRST HOUR: CPT

## 2020-09-05 RX ORDER — PROPOFOL 10 MG/ML
5 INJECTION, EMULSION INTRAVENOUS
Qty: 1000 | Refills: 0 | Status: DISCONTINUED | OUTPATIENT
Start: 2020-09-05 | End: 2020-09-05

## 2020-09-05 RX ORDER — ACETAMINOPHEN 500 MG
650 TABLET ORAL ONCE
Refills: 0 | Status: COMPLETED | OUTPATIENT
Start: 2020-09-05 | End: 2020-09-05

## 2020-09-05 RX ORDER — ACETAMINOPHEN 500 MG
1000 TABLET ORAL ONCE
Refills: 0 | Status: COMPLETED | OUTPATIENT
Start: 2020-09-05 | End: 2020-09-05

## 2020-09-05 RX ORDER — FENTANYL CITRATE 50 UG/ML
50 INJECTION INTRAVENOUS
Refills: 0 | Status: DISCONTINUED | OUTPATIENT
Start: 2020-09-05 | End: 2020-09-09

## 2020-09-05 RX ORDER — SODIUM CHLORIDE 9 MG/ML
500 INJECTION INTRAMUSCULAR; INTRAVENOUS; SUBCUTANEOUS ONCE
Refills: 0 | Status: COMPLETED | OUTPATIENT
Start: 2020-09-05 | End: 2020-09-05

## 2020-09-05 RX ADMIN — HEPARIN SODIUM 5000 UNIT(S): 5000 INJECTION INTRAVENOUS; SUBCUTANEOUS at 07:44

## 2020-09-05 RX ADMIN — Medication 2: at 17:44

## 2020-09-05 RX ADMIN — Medication 100 MILLIGRAM(S): at 01:00

## 2020-09-05 RX ADMIN — HYDROMORPHONE HYDROCHLORIDE 0.5 MILLIGRAM(S): 2 INJECTION INTRAMUSCULAR; INTRAVENOUS; SUBCUTANEOUS at 03:21

## 2020-09-05 RX ADMIN — CARVEDILOL PHOSPHATE 25 MILLIGRAM(S): 80 CAPSULE, EXTENDED RELEASE ORAL at 05:59

## 2020-09-05 RX ADMIN — Medication 400 MILLIGRAM(S): at 05:32

## 2020-09-05 RX ADMIN — INSULIN GLARGINE 10 UNIT(S): 100 INJECTION, SOLUTION SUBCUTANEOUS at 07:19

## 2020-09-05 RX ADMIN — Medication 100 MILLIGRAM(S): at 16:55

## 2020-09-05 RX ADMIN — PROPOFOL 2.18 MICROGRAM(S)/KG/MIN: 10 INJECTION, EMULSION INTRAVENOUS at 00:15

## 2020-09-05 RX ADMIN — Medication 100 MILLIGRAM(S): at 07:31

## 2020-09-05 RX ADMIN — Medication 4: at 11:22

## 2020-09-05 RX ADMIN — Medication 650 MILLIGRAM(S): at 18:30

## 2020-09-05 RX ADMIN — Medication 4: at 23:25

## 2020-09-05 RX ADMIN — SODIUM CHLORIDE 500 MILLILITER(S): 9 INJECTION INTRAMUSCULAR; INTRAVENOUS; SUBCUTANEOUS at 07:33

## 2020-09-05 RX ADMIN — Medication 81 MILLIGRAM(S): at 11:06

## 2020-09-05 RX ADMIN — PANTOPRAZOLE SODIUM 40 MILLIGRAM(S): 20 TABLET, DELAYED RELEASE ORAL at 11:06

## 2020-09-05 RX ADMIN — TAMSULOSIN HYDROCHLORIDE 0.4 MILLIGRAM(S): 0.4 CAPSULE ORAL at 22:14

## 2020-09-05 RX ADMIN — AMLODIPINE BESYLATE 10 MILLIGRAM(S): 2.5 TABLET ORAL at 05:59

## 2020-09-05 RX ADMIN — Medication 100 MILLIGRAM(S): at 17:55

## 2020-09-05 RX ADMIN — AMLODIPINE BESYLATE 10 MILLIGRAM(S): 2.5 TABLET ORAL at 08:02

## 2020-09-05 RX ADMIN — Medication 650 MILLIGRAM(S): at 17:50

## 2020-09-05 RX ADMIN — Medication 1000 MILLIGRAM(S): at 06:25

## 2020-09-05 RX ADMIN — HEPARIN SODIUM 5000 UNIT(S): 5000 INJECTION INTRAVENOUS; SUBCUTANEOUS at 16:55

## 2020-09-05 RX ADMIN — HYDROMORPHONE HYDROCHLORIDE 0.5 MILLIGRAM(S): 2 INJECTION INTRAMUSCULAR; INTRAVENOUS; SUBCUTANEOUS at 04:22

## 2020-09-05 RX ADMIN — Medication 75 MICROGRAM(S): at 05:59

## 2020-09-05 RX ADMIN — CARVEDILOL PHOSPHATE 25 MILLIGRAM(S): 80 CAPSULE, EXTENDED RELEASE ORAL at 17:55

## 2020-09-05 NOTE — PROGRESS NOTE ADULT - ATTENDING COMMENTS
I independently performed the key portions of the evaluation and management service provided. I agree with the above history, physical, and plan which I have reviewed and edited where appropriate. I find ESRD. Cont dialysis. See full note. (Patient seen earlier in day.) I independently performed the key portions of the evaluation and management service provided. I agree with the above history, physical, and plan which I have reviewed and edited where appropriate. I find ESRD, HTN controlled. Cont dialysis. See full note. (Patient seen earlier in day.)

## 2020-09-05 NOTE — PROGRESS NOTE ADULT - ASSESSMENT
63 yo M w/ pmhx of ESRD, HTN, DM and T3N1M0 left tongue SCCa now s/p left hemiglossectomy, left modified radical neck dissection, right ALT free flap and tracheotomy. Nephrology consulted for dialysis. Pt seen on dialysis.    # ESRD on HD TTS via LUE AVF   - getting HD today  - daily weights  - renal diet  - strict I/O     Dialyzer: Optiflux [id number]         QB: 400 mL/min         QD: 500 mL/min      K bath: 3  Goal UF: 1.5-2L                Duration: 180 min     Patient is tolerating the procedure well. Continue full hemodialysis treatment as prescribed.    # HTN  - UF with HD    # Renal bone disease  - phos, calcium noted, acceptable     # Anemia  - s/p hemotransfusions  - monitor H/H  - no DANIEL in setting of malignancy

## 2020-09-05 NOTE — PROGRESS NOTE ADULT - ASSESSMENT
64M w/ left tongue SCC now s/p left hemiglossectomy, left modified radical neck dissection, right ALT free flap and tracheotomy.  Pt tolerated procedure well and was transferred to the SICU in stable condition. RTOR on 9/4 for dusky flap    Neuro: dilaudid PRN  HEENT: Neutral to slightly right neck position Flap checks q1h, ASA 81qd.   CV: stable.  coreg 25bid, norvasc 10qd,   Pulm: Trach collar,   GI: NPO Doboff, TF nepro bolus. Protonix   : ESRD on HD,   ID: ppx: Ancef( 9/1-) Flagyl( 9/1 - )   Endo: ISS Levothyroxine, lantus 10qAM.   ppx: SCD  SQH.   Lines: ROSA M Heath( 9/1-4)   Wounds: PEBBLES x2 in Rt thigh, &x1  Left Neck PEBBLES  PT/OT: ordered.

## 2020-09-05 NOTE — PROGRESS NOTE ADULT - SUBJECTIVE AND OBJECTIVE BOX
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 s/p L hemigloss, L ND, R ALT FF, NGT, trach 8LPC     Hospital Course:  9/2: weaned off pressors in SICU. soft pressures s/p bolus of albumin and PRBC 1 unit each. otherwise stable. no sedation and no pressors during am rounds. weaned to CPAP 30%. flap doing well with strong signal. s/p 300 rectal ASA. on abx  9/3: NAEON, flap doing well. pressures stable with MAPs >65. hgb 6.7, s/p 1u PRBC > r/p hgb 8. HD tomorrow per renal. PT unable to see today b/c anemia  9/4: NAEON. flap doing well. pressures stable with MAPs >65. HD today per renal. PT today  9/5: NAEON. had flap compromise yesterday late afternoon (no venous signal, flap dusky and no bleeding pinprick). s/p RTOR with flap debridement: left neck incision opened. arterial and venous anastomosis found to be intact with good flow on doppler.  ALT flap trimmed to bleeding tissue and anterior mouth packed with gauze.  left neck incision closed with interrupted nylon suture. overnight, hgb drop 6.9 s/p transfusion > hgb 7.5. venous and arterial signals good in the am.     Vital Signs Last 24 Hrs  T(C): 36.6 (05 Sep 2020 09:18), Max: 38.2 (05 Sep 2020 04:00)  T(F): 97.9 (05 Sep 2020 09:18), Max: 100.8 (05 Sep 2020 04:00)  HR: 81 (05 Sep 2020 10:00) (73 - 122)  BP: 119/59 (05 Sep 2020 10:00) (102/53 - 175/80)  BP(mean): 85 (05 Sep 2020 10:00) (74 - 115)  RR: 14 (05 Sep 2020 10:00) (8 - 25)  SpO2: 98% (05 Sep 2020 10:00) (97% - 100%)    MEDICATIONS  (STANDING):  amLODIPine   Tablet 10 milliGRAM(s) Oral daily  aspirin  chewable 81 milliGRAM(s) Oral daily  carvedilol 25 milliGRAM(s) Oral every 12 hours  ceFAZolin   IVPB 1000 milliGRAM(s) IV Intermittent every 24 hours  chlorhexidine 2% Cloths 1 Application(s) Topical daily  dextrose 5%. 1000 milliLiter(s) (50 mL/Hr) IV Continuous <Continuous>  dextrose 50% Injectable 12.5 Gram(s) IV Push once  dextrose 50% Injectable 25 Gram(s) IV Push once  dextrose 50% Injectable 25 Gram(s) IV Push once  heparin   Injectable 5000 Unit(s) SubCutaneous every 8 hours  insulin glargine Injectable (LANTUS) 10 Unit(s) SubCutaneous every morning  insulin lispro (HumaLOG) corrective regimen sliding scale   SubCutaneous Before meals and at bedtime  levothyroxine 75 MICROGram(s) Oral daily  metroNIDAZOLE  IVPB 500 milliGRAM(s) IV Intermittent every 8 hours  pantoprazole  Injectable 40 milliGRAM(s) IV Push daily  tamsulosin 0.4 milliGRAM(s) Oral at bedtime    MEDICATIONS  (PRN):  dextrose 40% Gel 15 Gram(s) Oral once PRN Blood Glucose LESS THAN 70 milliGRAM(s)/deciliter  fentaNYL    Injectable 50 MICROGram(s) IV Push every 3 hours PRN For pain and aggitation  glucagon  Injectable 1 milliGRAM(s) IntraMuscular once PRN Glucose LESS THAN 70 milligrams/deciliter  HYDROmorphone  Injectable 0.5 milliGRAM(s) IV Push every 4 hours PRN Moderate Pain (4 - 6)  HYDROmorphone  Injectable 1 milliGRAM(s) IV Push every 4 hours PRN Severe Pain (7 - 10)                                              7.6                   Neurophils% (auto):   x      (09-05 @ 05:55):    6.94 )-----------(140          Lymphocytes% (auto):  x                                             23.0                   Eosinphils% (auto):   x        Manual%: Neutrophils x    ; Lymphocytes x    ; Eosinophils x    ; Bands%: x    ; Blasts x          09-05    137  |  98  |  46<H>  ----------------------------<  106<H>  3.7   |  28  |  4.88<H>    Ca    8.2<L>      05 Sep 2020 05:55  Phos  2.9     09-05  Mg     2.4     09-05    TPro  5.1<L>  /  Alb  2.3<L>  /  TBili  0.2  /  DBili  x   /  AST  18  /  ALT  <5<L>  /  AlkPhos  55  09-05      ABG - ( 05 Sep 2020 01:28 )  pH: 7.55  /  pCO2: 35    /  pO2: 280   / HCO3: 29    / Base Excess: 6.5   /  SaO2: 100   / Lactate: x        General: AAOx3, arousable  Pulm: Nonlabored breathing, no respiratory distress. 8LPC, CPAP 30%, saturating well  Extrem: WWP, no edema. R thigh x2 JPs SS drainage. incision c/d/i on R thigh  HEENT: neck incision c/d/i, PEBBLES L neck SS, neck soft and flat. external flap doppler signal is strong for arterial. cook doppler for venous signal is wnl. intraoral flap has exposed muscle, which has BRB on gentle manipulation. sutures intact. gauze packing to anterior FOM     A/P: 64M w/ pmhx of ESRD, HTN, DM and T3N1M0 left tongue SCCa now s/p left hemiglossectomy, left modified radical neck dissection, right ALT free flap and tracheotomy. s/p L hemigloss, L ND, R ALT FF, NGT, trach 8LPC. s/p RTOR on 9/5 flap debridement of devascularized tissue  - q1 nursing flap checks - visual to see redness and color of flap, plus doppler/ signals  - HD per sicu/renal  - may continue with feeds  - ASA 81/sqh  - OOBTC  - avoid pressors, and maintain MAP 65+  - regular trach suctioning and cleaning of inner cannula  - monitor PEBBLES output  - appreciate SICU care  ----------------------------------------------  Jorge Mack MD  Resident  Department of Otolaryngology - Head and Neck Surgery  [facility]

## 2020-09-05 NOTE — PROGRESS NOTE ADULT - SUBJECTIVE AND OBJECTIVE BOX
Patient is a 64y Male seen and evaluated at bedside. NAD, denies any complaints.    Meds:    amLODIPine   Tablet 10 daily  aspirin  chewable 81 daily  carvedilol 25 every 12 hours  ceFAZolin   IVPB 1000 every 24 hours  chlorhexidine 2% Cloths 1 daily  dextrose 40% Gel 15 once PRN  dextrose 5%. 1000 <Continuous>  dextrose 50% Injectable 12.5 once  dextrose 50% Injectable 25 once  dextrose 50% Injectable 25 once  fentaNYL    Injectable 50 every 3 hours PRN  glucagon  Injectable 1 once PRN  heparin   Injectable 5000 every 8 hours  HYDROmorphone  Injectable 0.5 every 4 hours PRN  HYDROmorphone  Injectable 1 every 4 hours PRN  insulin glargine Injectable (LANTUS) 10 every morning  insulin lispro (HumaLOG) corrective regimen sliding scale  Before meals and at bedtime  levothyroxine 75 daily  metroNIDAZOLE  IVPB 500 every 8 hours  pantoprazole  Injectable 40 daily  tamsulosin 0.4 at bedtime      T(C): , Max: 38.2 (09-05-20 @ 04:00)  T(F): , Max: 100.8 (09-05-20 @ 04:00)  HR: 84 (09-05-20 @ 12:00)  BP: 122/58 (09-05-20 @ 12:00)  BP(mean): 83 (09-05-20 @ 12:00)  RR: 14 (09-05-20 @ 12:00)  SpO2: 95% (09-05-20 @ 12:00)  Wt(kg): --    09-04 @ 07:01  -  09-05 @ 07:00  --------------------------------------------------------  IN: 1190 mL / OUT: 2755 mL / NET: -1565 mL    09-05 @ 07:01  -  09-05 @ 13:08  --------------------------------------------------------  IN: 500 mL / OUT: 35 mL / NET: 465 mL          PHYSICAL EXAM:  GENERAL: no acute distress at present  NECK: No JVD, trach collar  CHEST/LUNG: equal breath sounds bilaterally  HEART: normal S1S2, RRR  ABDOMEN: Soft, Nontender  EXTREMITIES: No LE edema bilateral  ACCESS: LUE AVF, good thrill and bruit appreciated      LABS:                        7.5    7.11  )-----------( 147      ( 05 Sep 2020 11:53 )             22.7     09-05    137  |  98  |  46<H>  ----------------------------<  106<H>  3.7   |  28  |  4.88<H>    Ca    8.2<L>      05 Sep 2020 05:55  Phos  2.9     09-05  Mg     2.4     09-05    TPro  5.1<L>  /  Alb  2.3<L>  /  TBili  0.2  /  DBili  x   /  AST  18  /  ALT  <5<L>  /  AlkPhos  55  09-05      PT/INR - ( 03 Sep 2020 17:40 )   PT: 11.3 sec;   INR: 0.94          PTT - ( 03 Sep 2020 17:40 )  PTT:36.6 sec          RADIOLOGY & ADDITIONAL STUDIES: Patient is a 64y Male seen and evaluated at bedside. NAD, denies any complaints.  Dialyzed today.     Meds:    amLODIPine   Tablet 10 daily  aspirin  chewable 81 daily  carvedilol 25 every 12 hours  ceFAZolin   IVPB 1000 every 24 hours  chlorhexidine 2% Cloths 1 daily  dextrose 40% Gel 15 once PRN  dextrose 5%. 1000 <Continuous>  dextrose 50% Injectable 12.5 once  dextrose 50% Injectable 25 once  dextrose 50% Injectable 25 once  fentaNYL    Injectable 50 every 3 hours PRN  glucagon  Injectable 1 once PRN  heparin   Injectable 5000 every 8 hours  HYDROmorphone  Injectable 0.5 every 4 hours PRN  HYDROmorphone  Injectable 1 every 4 hours PRN  insulin glargine Injectable (LANTUS) 10 every morning  insulin lispro (HumaLOG) corrective regimen sliding scale  Before meals and at bedtime  levothyroxine 75 daily  metroNIDAZOLE  IVPB 500 every 8 hours  pantoprazole  Injectable 40 daily  tamsulosin 0.4 at bedtime      T(C): , Max: 38.2 (09-05-20 @ 04:00)  T(F): , Max: 100.8 (09-05-20 @ 04:00)  HR: 84 (09-05-20 @ 12:00)  BP: 122/58 (09-05-20 @ 12:00)  BP(mean): 83 (09-05-20 @ 12:00)  RR: 14 (09-05-20 @ 12:00)  SpO2: 95% (09-05-20 @ 12:00)  Wt(kg): --    09-04 @ 07:01  -  09-05 @ 07:00  --------------------------------------------------------  IN: 1190 mL / OUT: 2755 mL / NET: -1565 mL    09-05 @ 07:01  -  09-05 @ 13:08  --------------------------------------------------------  IN: 500 mL / OUT: 35 mL / NET: 465 mL          PHYSICAL EXAM:  GENERAL: no acute distress at present  NECK: No JVD, trach collar  CHEST/LUNG: equal breath sounds bilaterally  HEART: normal S1S2, RRR  ABDOMEN: Soft, Nontender  EXTREMITIES: No LE edema bilateral  ACCESS: LUE AVF, good thrill and bruit appreciated      LABS:                        7.5    7.11  )-----------( 147      ( 05 Sep 2020 11:53 )             22.7     09-05    137  |  98  |  46<H>  ----------------------------<  106<H>  3.7   |  28  |  4.88<H>    Ca    8.2<L>      05 Sep 2020 05:55  Phos  2.9     09-05  Mg     2.4     09-05    TPro  5.1<L>  /  Alb  2.3<L>  /  TBili  0.2  /  DBili  x   /  AST  18  /  ALT  <5<L>  /  AlkPhos  55  09-05      PT/INR - ( 03 Sep 2020 17:40 )   PT: 11.3 sec;   INR: 0.94          PTT - ( 03 Sep 2020 17:40 )  PTT:36.6 sec          RADIOLOGY & ADDITIONAL STUDIES:

## 2020-09-05 NOTE — PROGRESS NOTE ADULT - ATTENDING COMMENTS
ELTON Ramírez has acted as my scribe. Events noted. Pt still sedate this Am but upon awakening tolerated CPAP and placed back on trach collar. wounds clean and dry on right LE and drain output noted. ENT monitoring flap. Pt with drop in Hb and transfused and will repeat CBc. Maybe some dilutional component as well in setting of ESRD. continue bolus feeds and Lantus.

## 2020-09-05 NOTE — PROGRESS NOTE ADULT - SUBJECTIVE AND OBJECTIVE BOX
S: Pt sedated, ROS deferred.     Vitals: ICU Vital Signs Last 24 Hrs  T(C): 36.6 (05 Sep 2020 09:18), Max: 38.2 (05 Sep 2020 04:00)  T(F): 97.9 (05 Sep 2020 09:18), Max: 100.8 (05 Sep 2020 04:00)  HR: 85 (05 Sep 2020 09:06) (73 - 122)  BP: 102/53 (05 Sep 2020 07:00) (102/53 - 175/80)  BP(mean): 74 (05 Sep 2020 07:00) (74 - 115)  ABP: 139/45 (05 Sep 2020 08:00) (111/41 - 197/55)  ABP(mean): 74 (05 Sep 2020 08:00) (61 - 100)  RR: 8 (05 Sep 2020 08:00) (8 - 25)  SpO2: 100% (05 Sep 2020 09:06) (97% - 100%)            I&O:  I&O's Detail    04 Sep 2020 07:01  -  05 Sep 2020 07:00  --------------------------------------------------------  IN:    IV PiggyBack: 250 mL    Nepro with Carb Steady: 870 mL    propofol Infusion: 70 mL  Total IN: 1190 mL    OUT:    Drain: 35 mL    Drain: 130 mL    Drain: 70 mL    Other: 2500 mL    Voided: 20 mL  Total OUT: 2755 mL    Total NET: -1565 mL      05 Sep 2020 07:01  -  05 Sep 2020 09:51  --------------------------------------------------------  IN:    IV PiggyBack: 100 mL  Total IN: 100 mL    OUT:  Total OUT: 0 mL    Total NET: 100 mL          Mode: off 40% t/c    ABG - ( 05 Sep 2020 01:28 )  pH, Arterial: 7.55  pH, Blood: x     /  pCO2: 35    /  pO2: 280   / HCO3: 29    / Base Excess: 6.5   /  SaO2: 100             CAPILLARY BLOOD GLUCOSE      POCT Blood Glucose.: 109 mg/dL (05 Sep 2020 05:54)  POCT Blood Glucose.: 150 mg/dL (05 Sep 2020 00:39)  POCT Blood Glucose.: 229 mg/dL (04 Sep 2020 16:12)  POCT Blood Glucose.: 165 mg/dL (04 Sep 2020 10:56)      Labs:                         7.6    6.94  )-----------( 140      ( 05 Sep 2020 05:55 )             23.0   09-05    137  |  98  |  46<H>  ----------------------------<  106<H>  3.7   |  28  |  4.88<H>    Ca    8.2<L>      05 Sep 2020 05:55  Phos  2.9     09-05  Mg     2.4     09-05    TPro  5.1<L>  /  Alb  2.3<L>  /  TBili  0.2  /  DBili  x   /  AST  18  /  ALT  <5<L>  /  AlkPhos  55  09-05  PT/INR - ( 03 Sep 2020 17:40 )   PT: 11.3 sec;   INR: 0.94          PTT - ( 03 Sep 2020 17:40 )  PTT:36.6 sec  ABG - ( 05 Sep 2020 01:28 )  pH, Arterial: 7.55  pH, Blood: x     /  pCO2: 35    /  pO2: 280   / HCO3: 29    / Base Excess: 6.5   /  SaO2: 100                 Electrolytes repleated to goals as follows: Potassium 4, Phosphorus 3 and Magnesium 2 where appropriate and necessary    Exam:  Neuro: Sedated  HEENT: PERRL < EOMI, MMM  Neck: Supple, tracheostomy in place, no surrounding erythema or ecchymosis. Flap incision c/d/i with sutures.   CV: RRR, no MRGs  Pulm: CTAB, no wheezes, rales, or rhonchi  Abd: BS (+), Soft, NT, ND  Ext: No edema peripherally or centrally  Vasc: Extremities warm to palpation, 2+ pulses - radial and PT  MSK: no joint swelling  Skin: no rash

## 2020-09-06 LAB
ANION GAP SERPL CALC-SCNC: 12 MMOL/L — SIGNIFICANT CHANGE UP (ref 5–17)
BUN SERPL-MCNC: 43 MG/DL — HIGH (ref 7–23)
CALCIUM SERPL-MCNC: 8.3 MG/DL — LOW (ref 8.4–10.5)
CHLORIDE SERPL-SCNC: 97 MMOL/L — SIGNIFICANT CHANGE UP (ref 96–108)
CO2 SERPL-SCNC: 28 MMOL/L — SIGNIFICANT CHANGE UP (ref 22–31)
CREAT SERPL-MCNC: 4.21 MG/DL — HIGH (ref 0.5–1.3)
GLUCOSE BLDC GLUCOMTR-MCNC: 117 MG/DL — HIGH (ref 70–99)
GLUCOSE BLDC GLUCOMTR-MCNC: 198 MG/DL — HIGH (ref 70–99)
GLUCOSE BLDC GLUCOMTR-MCNC: 254 MG/DL — HIGH (ref 70–99)
GLUCOSE BLDC GLUCOMTR-MCNC: 294 MG/DL — HIGH (ref 70–99)
GLUCOSE SERPL-MCNC: 113 MG/DL — HIGH (ref 70–99)
HCT VFR BLD CALC: 24.9 % — LOW (ref 39–50)
HGB BLD-MCNC: 8.1 G/DL — LOW (ref 13–17)
MAGNESIUM SERPL-MCNC: 2.4 MG/DL — SIGNIFICANT CHANGE UP (ref 1.6–2.6)
MCHC RBC-ENTMCNC: 28.3 PG — SIGNIFICANT CHANGE UP (ref 27–34)
MCHC RBC-ENTMCNC: 32.5 GM/DL — SIGNIFICANT CHANGE UP (ref 32–36)
MCV RBC AUTO: 87.1 FL — SIGNIFICANT CHANGE UP (ref 80–100)
NRBC # BLD: 0 /100 WBCS — SIGNIFICANT CHANGE UP (ref 0–0)
PHOSPHATE SERPL-MCNC: 3.2 MG/DL — SIGNIFICANT CHANGE UP (ref 2.5–4.5)
PLATELET # BLD AUTO: 176 K/UL — SIGNIFICANT CHANGE UP (ref 150–400)
POTASSIUM SERPL-MCNC: 3.8 MMOL/L — SIGNIFICANT CHANGE UP (ref 3.5–5.3)
POTASSIUM SERPL-SCNC: 3.8 MMOL/L — SIGNIFICANT CHANGE UP (ref 3.5–5.3)
RBC # BLD: 2.86 M/UL — LOW (ref 4.2–5.8)
RBC # FLD: 15.5 % — HIGH (ref 10.3–14.5)
SODIUM SERPL-SCNC: 137 MMOL/L — SIGNIFICANT CHANGE UP (ref 135–145)
WBC # BLD: 7.58 K/UL — SIGNIFICANT CHANGE UP (ref 3.8–10.5)
WBC # FLD AUTO: 7.58 K/UL — SIGNIFICANT CHANGE UP (ref 3.8–10.5)

## 2020-09-06 PROCEDURE — 99232 SBSQ HOSP IP/OBS MODERATE 35: CPT | Mod: GC

## 2020-09-06 PROCEDURE — 99233 SBSQ HOSP IP/OBS HIGH 50: CPT | Mod: GC

## 2020-09-06 PROCEDURE — 71045 X-RAY EXAM CHEST 1 VIEW: CPT | Mod: 26

## 2020-09-06 RX ORDER — POTASSIUM CHLORIDE 20 MEQ
20 PACKET (EA) ORAL ONCE
Refills: 0 | Status: COMPLETED | OUTPATIENT
Start: 2020-09-06 | End: 2020-09-06

## 2020-09-06 RX ORDER — ACETAMINOPHEN 500 MG
650 TABLET ORAL EVERY 6 HOURS
Refills: 0 | Status: DISCONTINUED | OUTPATIENT
Start: 2020-09-06 | End: 2020-09-09

## 2020-09-06 RX ADMIN — CARVEDILOL PHOSPHATE 25 MILLIGRAM(S): 80 CAPSULE, EXTENDED RELEASE ORAL at 17:32

## 2020-09-06 RX ADMIN — Medication 20 MILLIEQUIVALENT(S): at 10:07

## 2020-09-06 RX ADMIN — PANTOPRAZOLE SODIUM 40 MILLIGRAM(S): 20 TABLET, DELAYED RELEASE ORAL at 11:00

## 2020-09-06 RX ADMIN — HYDROMORPHONE HYDROCHLORIDE 1 MILLIGRAM(S): 2 INJECTION INTRAMUSCULAR; INTRAVENOUS; SUBCUTANEOUS at 13:49

## 2020-09-06 RX ADMIN — Medication 81 MILLIGRAM(S): at 11:00

## 2020-09-06 RX ADMIN — Medication 100 MILLIGRAM(S): at 00:51

## 2020-09-06 RX ADMIN — AMLODIPINE BESYLATE 10 MILLIGRAM(S): 2.5 TABLET ORAL at 10:07

## 2020-09-06 RX ADMIN — HEPARIN SODIUM 5000 UNIT(S): 5000 INJECTION INTRAVENOUS; SUBCUTANEOUS at 08:00

## 2020-09-06 RX ADMIN — Medication 6: at 11:01

## 2020-09-06 RX ADMIN — HYDROMORPHONE HYDROCHLORIDE 1 MILLIGRAM(S): 2 INJECTION INTRAMUSCULAR; INTRAVENOUS; SUBCUTANEOUS at 12:22

## 2020-09-06 RX ADMIN — HEPARIN SODIUM 5000 UNIT(S): 5000 INJECTION INTRAVENOUS; SUBCUTANEOUS at 00:51

## 2020-09-06 RX ADMIN — TAMSULOSIN HYDROCHLORIDE 0.4 MILLIGRAM(S): 0.4 CAPSULE ORAL at 22:00

## 2020-09-06 RX ADMIN — HEPARIN SODIUM 5000 UNIT(S): 5000 INJECTION INTRAVENOUS; SUBCUTANEOUS at 16:09

## 2020-09-06 RX ADMIN — Medication 100 MILLIGRAM(S): at 08:00

## 2020-09-06 RX ADMIN — Medication 75 MICROGRAM(S): at 06:45

## 2020-09-06 RX ADMIN — CHLORHEXIDINE GLUCONATE 1 APPLICATION(S): 213 SOLUTION TOPICAL at 07:37

## 2020-09-06 RX ADMIN — Medication 6: at 16:25

## 2020-09-06 RX ADMIN — Medication 650 MILLIGRAM(S): at 01:19

## 2020-09-06 RX ADMIN — Medication 2: at 22:00

## 2020-09-06 RX ADMIN — CARVEDILOL PHOSPHATE 25 MILLIGRAM(S): 80 CAPSULE, EXTENDED RELEASE ORAL at 06:45

## 2020-09-06 RX ADMIN — INSULIN GLARGINE 10 UNIT(S): 100 INJECTION, SOLUTION SUBCUTANEOUS at 08:00

## 2020-09-06 RX ADMIN — Medication 650 MILLIGRAM(S): at 22:00

## 2020-09-06 RX ADMIN — Medication 650 MILLIGRAM(S): at 01:50

## 2020-09-06 RX ADMIN — Medication 100 MILLIGRAM(S): at 17:32

## 2020-09-06 RX ADMIN — Medication 100 MILLIGRAM(S): at 16:09

## 2020-09-06 NOTE — PROGRESS NOTE ADULT - ASSESSMENT
64 year old with ESRD, HTN, anemia.    Suggest:    1. Cont HD TW.  2. DANIEL at HD.  3. Cont BP meds.    Please call with any questions.    Glenn Cardona MD, FACP, FASN | kidney.[facility]  Nephrology, Hypertension, and Internal Medicine  Mobile: [phone] (Daytime Hours Only)  Office/Answering Service: [phone]  Asst. Prof. of Medicine, [facility] [employer] at [facility] - Nephrology at [address]  [address], [address], [place], [place]

## 2020-09-06 NOTE — PROGRESS NOTE ADULT - SUBJECTIVE AND OBJECTIVE BOX
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 s/p L hemigloss, L ND, R ALT FF, NGT, trach 8LPC     Hospital Course:  9/2: weaned off pressors in SICU. soft pressures s/p bolus of albumin and PRBC 1 unit each. otherwise stable. no sedation and no pressors during am rounds. weaned to CPAP 30%. flap doing well with strong signal. s/p 300 rectal ASA. on abx  9/3: NAEON, flap doing well. pressures stable with MAPs >65. hgb 6.7, s/p 1u PRBC > r/p hgb 8. HD tomorrow per renal. PT unable to see today b/c anemia  9/4: NAEON. flap doing well. pressures stable with MAPs >65. HD today per renal. PT today  9/5: NAEON. had flap compromise yesterday late afternoon (no venous signal, flap dusky and no bleeding pinprick). s/p RTOR with flap debridement: left neck incision opened. arterial and venous anastomosis found to be intact with good flow on doppler.  ALT flap trimmed to bleeding tissue and anterior mouth packed with gauze.  left neck incision closed with interrupted nylon suture. overnight, hgb drop 6.9 s/p transfusion > hgb 7.5. venous and arterial signals good in the am.   9/6: NAEON. Bleeding with mild scratching on flap. Otherwise doing well. Pain controlled. Breathing well. Will stepdown today. Cuff deflated yesterday.    PAST MEDICAL & SURGICAL HISTORY:  Hypothyroidism  Lower extremity edema  Hyperlipidemia  BPH (benign prostatic hyperplasia)  Type II diabetes mellitus  Essential hypertension  Chronic kidney disease (CKD), stage V: Hemodialysis  AV fistula: left UE  S/P appendectomy    No Known Allergies    MEDICATIONS  (STANDING):  amLODIPine   Tablet 10 milliGRAM(s) Oral daily  aspirin  chewable 81 milliGRAM(s) Oral daily  carvedilol 25 milliGRAM(s) Oral every 12 hours  ceFAZolin   IVPB 1000 milliGRAM(s) IV Intermittent every 24 hours  chlorhexidine 2% Cloths 1 Application(s) Topical daily  dextrose 5%. 1000 milliLiter(s) (50 mL/Hr) IV Continuous <Continuous>  dextrose 50% Injectable 12.5 Gram(s) IV Push once  dextrose 50% Injectable 25 Gram(s) IV Push once  dextrose 50% Injectable 25 Gram(s) IV Push once  heparin   Injectable 5000 Unit(s) SubCutaneous every 8 hours  insulin glargine Injectable (LANTUS) 10 Unit(s) SubCutaneous every morning  insulin lispro (HumaLOG) corrective regimen sliding scale   SubCutaneous Before meals and at bedtime  levothyroxine 75 MICROGram(s) Oral daily  metroNIDAZOLE  IVPB 500 milliGRAM(s) IV Intermittent every 8 hours  pantoprazole  Injectable 40 milliGRAM(s) IV Push daily  tamsulosin 0.4 milliGRAM(s) Oral at bedtime    MEDICATIONS  (PRN):  acetaminophen    Suspension .. 650 milliGRAM(s) Oral every 6 hours PRN Temp greater or equal to 38C (100.4F)  dextrose 40% Gel 15 Gram(s) Oral once PRN Blood Glucose LESS THAN 70 milliGRAM(s)/deciliter  fentaNYL    Injectable 50 MICROGram(s) IV Push every 3 hours PRN For pain and aggitation  glucagon  Injectable 1 milliGRAM(s) IntraMuscular once PRN Glucose LESS THAN 70 milligrams/deciliter  HYDROmorphone  Injectable 0.5 milliGRAM(s) IV Push every 4 hours PRN Moderate Pain (4 - 6)  HYDROmorphone  Injectable 1 milliGRAM(s) IV Push every 4 hours PRN Severe Pain (7 - 10)      Objective    ICU Vital Signs Last 24 Hrs  T(C): 37.5 (06 Sep 2020 18:03), Max: 38.4 (06 Sep 2020 00:35)  T(F): 99.5 (06 Sep 2020 18:03), Max: 101.2 (06 Sep 2020 00:35)  HR: 88 (06 Sep 2020 18:00) (74 - 102)  BP: 135/64 (06 Sep 2020 18:00) (123/60 - 157/69)  BP(mean): 92 (06 Sep 2020 18:00) (83 - 102)  ABP: 182/56 (06 Sep 2020 10:00) (135/41 - 182/56)  ABP(mean): 94 (06 Sep 2020 10:00) (70 - 94)  RR: 18 (06 Sep 2020 18:00) (16 - 20)  SpO2: 100% (06 Sep 2020 18:00) (98% - 100%)        General: AAOx3, arousable  Pulm: Nonlabored breathing, no respiratory distress. 8LPC, CPAP 30%, saturating well  Extrem: WWP, no edema. R thigh x2 JPs SS drainage. incision c/d/i on R thigh  HEENT: neck incision c/d/i, PEBBLES L neck SS, neck soft and flat. external flap doppler signal is strong for arterial. cook doppler for venous signal is wnl. intraoral flap has exposed muscle, which has BRB on gentle manipulation. sutures intact. gauze packing to anterior FOM                           8.1    7.58  )-----------( 176      ( 06 Sep 2020 05:48 )             24.9     09-06    137  |  97  |  43<H>  ----------------------------<  113<H>  3.8   |  28  |  4.21<H>    Ca    8.3<L>      06 Sep 2020 05:48  Phos  3.2     09-06  Mg     2.4     09-06    TPro  5.1<L>  /  Alb  2.3<L>  /  TBili  0.2  /  DBili  x   /  AST  18  /  ALT  <5<L>  /  AlkPhos  55  09-05      I&O's Summary    05 Sep 2020 07:01  -  06 Sep 2020 07:00  --------------------------------------------------------  IN: 2600 mL / OUT: 3291 mL / NET: -691 mL    06 Sep 2020 07:01  -  06 Sep 2020 19:56  --------------------------------------------------------  IN: 961 mL / OUT: 105 mL / NET: 856 mL            A/P: 64M w/ pmhx of ESRD, HTN, DM and T3N1M0 left tongue SCCa now s/p left hemiglossectomy, left modified radical neck dissection, right ALT free flap and tracheotomy. s/p L hemigloss, L ND, R ALT FF, NGT, trach 8LPC. s/p RTOR on 9/5 flap debridement of devascularized tissue  - q1 nursing flap checks - visual to see redness and color of flap, plus doppler/ signals  - HD per sicu/renal  - may continue with feeds  - ASA 81/sqh  - OOBTC  - avoid pressors, and maintain MAP 65+  - regular trach suctioning and cleaning of inner cannula  - monitor PEBBLES output  - appreciate SICU care

## 2020-09-06 NOTE — PROGRESS NOTE ADULT - ASSESSMENT
64M w/ left tongue SCC now s/p left hemiglossectomy, left modified radical neck dissection, right ALT free flap and tracheotomy.  Pt tolerated procedure well and was transferred to the SICU in stable condition. RTOR on 9/4 for dusky flap    Neuro: dilaudid PRN  HEENT: Neutral to slightly right neck position Flap checks q1h, ASA 81qd.   CV: stable.  coreg 25bid, norvasc 10qd,   Pulm: Trach collar,   GI: Doboff, TF nepro bolus. Protonix   : ESRD on HD,   ID: ppx: Ancef( 9/1-) Flagyl( 9/1 - )   Endo: ISS Levothyroxine, lantus 10qAM.   ppx: SCD  SQH.   Lines: ROSA M Heath( 9/1-4)   Wounds: PEBBLES x2 in Rt thigh, &x1  Left Neck PEBBLES  PT/OT: ordered.

## 2020-09-06 NOTE — PROGRESS NOTE ADULT - SUBJECTIVE AND OBJECTIVE BOX
CC: MALIGNANT NEOPLASM OF TOUNSPECIFIED  MALIGNANT NEOPLASM OF TONGUE,UNSPECIFIED    INTERVAL HISTORY:    Dialyzed yesterday. * Bp controlled. * AF on abx.     ROS: No chest pain. No shortness of breath. No nausea.    PAST MEDICAL & SURGICAL HISTORY:  Hypothyroidism  Lower extremity edema  Hyperlipidemia  BPH (benign prostatic hyperplasia)  Type II diabetes mellitus  Essential hypertension  Chronic kidney disease (CKD), stage V: Hemodialysis  AV fistula: left UE  S/P appendectomy    PHYSICAL EXAM:  T(C): 37.5 (06 Sep 2020 18:03), Max: 38.4 (06 Sep 2020 00:35)  HR: 88 (06 Sep 2020 18:00)  BP: 135/64 (06 Sep 2020 18:00) (123/60 - 157/69)  RR: 18 (06 Sep 2020 18:00)  SpO2: 100% (06 Sep 2020 18:00)      05 Sep 2020 07:01  -  06 Sep 2020 07:00  --------------------------------------------------------  IN:    IV PiggyBack: 400 mL    Nepro with Carb Steady: 1600 mL    Other: 600 mL  Total IN: 2600 mL    OUT:    Drain: 1 mL    Drain: 50 mL    Drain: 90 mL    Incontinent per Condom Catheter: 250 mL    Other: 2900 mL  Total OUT: 3291 mL    Total NET: -691 mL      06 Sep 2020 07:01  -  06 Sep 2020 21:19  --------------------------------------------------------  IN:    IV PiggyBack: 250 mL    Nepro with Carb Steady: 711 mL  Total IN: 961 mL    OUT:    Drain: 30 mL    Drain: 50 mL    Voided: 25 mL  Total OUT: 105 mL    Total NET: 856 mL        Weight 72.7 (02 Sep 2020 08:40)    Appearance: alert, pleasant, INAD.  ENT: oral mucosa moist, no pallor/cyanosis.  Neck: no JVD visible.  Cardiac: no rubs. no murmurs. Extremities: NO EDEMA.  Skin: no rashes.  Extremities (digits): no clubbing or cyanosis.  Respratory effort: no access muscle use. Lungs: CLEAR TO AUSCULTATION.  Abdomen: soft. nontender. no masses.  Psych affect: not depressed.    MEDICATIONS  (STANDING):  amLODIPine   Tablet 10 milliGRAM(s) Oral daily  aspirin  chewable 81 milliGRAM(s) Oral daily  carvedilol 25 milliGRAM(s) Oral every 12 hours  ceFAZolin   IVPB 1000 milliGRAM(s) IV Intermittent every 24 hours  chlorhexidine 2% Cloths 1 Application(s) Topical daily  dextrose 5%. 1000 milliLiter(s) (50 mL/Hr) IV Continuous <Continuous>  dextrose 50% Injectable 12.5 Gram(s) IV Push once  dextrose 50% Injectable 25 Gram(s) IV Push once  dextrose 50% Injectable 25 Gram(s) IV Push once  heparin   Injectable 5000 Unit(s) SubCutaneous every 8 hours  insulin glargine Injectable (LANTUS) 10 Unit(s) SubCutaneous every morning  insulin lispro (HumaLOG) corrective regimen sliding scale   SubCutaneous Before meals and at bedtime  levothyroxine 75 MICROGram(s) Oral daily  metroNIDAZOLE  IVPB 500 milliGRAM(s) IV Intermittent every 8 hours  pantoprazole  Injectable 40 milliGRAM(s) IV Push daily  tamsulosin 0.4 milliGRAM(s) Oral at bedtime    MEDICATIONS  (PRN):  acetaminophen    Suspension .. 650 milliGRAM(s) Oral every 6 hours PRN Temp greater or equal to 38C (100.4F)  dextrose 40% Gel 15 Gram(s) Oral once PRN Blood Glucose LESS THAN 70 milliGRAM(s)/deciliter  fentaNYL    Injectable 50 MICROGram(s) IV Push every 3 hours PRN For pain and aggitation  glucagon  Injectable 1 milliGRAM(s) IntraMuscular once PRN Glucose LESS THAN 70 milligrams/deciliter  HYDROmorphone  Injectable 0.5 milliGRAM(s) IV Push every 4 hours PRN Moderate Pain (4 - 6)  HYDROmorphone  Injectable 1 milliGRAM(s) IV Push every 4 hours PRN Severe Pain (7 - 10)    DATA:  137    |  97     |  43<H>  ----------------------------<  113<H>  Ca:8.3<L> (06 Sep 2020 05:48)  3.8     |  28     |  4.21<H>      eGFR if Non : 14 <L>  eGFR if : 16 <L>    TPro  5.1<L>  /  Alb  2.3<L>  /  TBili  0.2    /  DBili  x      /  AST  18     /  ALT  <5<L>  /  AlkPhos  55     05 Sep 2020 01:24    SCr 4.21 [06 Sep 2020 05:48]  SCr 4.88 [05 Sep 2020 05:55]  SCr 4.67 [05 Sep 2020 01:24]  SCr 6.24 [04 Sep 2020 06:01]  SCr 4.84 [03 Sep 2020 06:11]                          8.1<L>  7.58  )-----------( 176      ( 06 Sep 2020 05:48 )             24.9<L>    Phos:3.2 mg/dL M.4 mg/dL PTH:-- Uric acid:-- Serum Osm:--  Ferritin:-- Iron:-- TIBC:-- Tsat:--  B12:-- TSH:-- (06 Sep 2020 05:48)

## 2020-09-06 NOTE — PROGRESS NOTE ADULT - SUBJECTIVE AND OBJECTIVE BOX
S: Pt reports feeling well, pain is well controlled, No HA/CP/SOB.     Vitals: ICU Vital Signs Last 24 Hrs  T(C): 37 (06 Sep 2020 05:25), Max: 38.4 (05 Sep 2020 18:05)  T(F): 98.6 (06 Sep 2020 05:25), Max: 101.2 (05 Sep 2020 18:05)  HR: 83 (06 Sep 2020 09:00) (74 - 107)  BP: 143/67 (06 Sep 2020 09:00) (119/59 - 161/72)  BP(mean): 96 (06 Sep 2020 09:00) (83 - 107)  ABP: 174/55 (06 Sep 2020 09:00) (135/41 - 192/57)  ABP(mean): 90 (06 Sep 2020 09:00) (70 - 103)  RR: 17 (06 Sep 2020 09:00) (14 - 20)  SpO2: 99% (06 Sep 2020 09:00) (95% - 100%)            I&O:  I&O's Detail    05 Sep 2020 07:01  -  06 Sep 2020 07:00  --------------------------------------------------------  IN:    IV PiggyBack: 400 mL    Nepro with Carb Steady: 1600 mL    Other: 600 mL  Total IN: 2600 mL    OUT:    Drain: 1 mL    Drain: 50 mL    Drain: 90 mL    Incontinent per Condom Catheter: 250 mL    Other: 2900 mL  Total OUT: 3291 mL    Total NET: -691 mL      06 Sep 2020 07:01  -  06 Sep 2020 09:25  --------------------------------------------------------  IN:    IV PiggyBack: 100 mL  Total IN: 100 mL    OUT:  Total OUT: 0 mL    Total NET: 100 mL          Mode: standby    ABG - ( 05 Sep 2020 01:28 )  pH, Arterial: 7.55  pH, Blood: x     /  pCO2: 35    /  pO2: 280   / HCO3: 29    / Base Excess: 6.5   /  SaO2: 100             CAPILLARY BLOOD GLUCOSE      POCT Blood Glucose.: 117 mg/dL (06 Sep 2020 05:10)  POCT Blood Glucose.: 203 mg/dL (05 Sep 2020 23:04)  POCT Blood Glucose.: 194 mg/dL (05 Sep 2020 17:05)  POCT Blood Glucose.: 216 mg/dL (05 Sep 2020 11:12)      Labs:                         8.1    7.58  )-----------( 176      ( 06 Sep 2020 05:48 )             24.9   09-06    137  |  97  |  43<H>  ----------------------------<  113<H>  3.8   |  28  |  4.21<H>    Ca    8.3<L>      06 Sep 2020 05:48  Phos  3.2     09-06  Mg     2.4     09-06    TPro  5.1<L>  /  Alb  2.3<L>  /  TBili  0.2  /  DBili  x   /  AST  18  /  ALT  <5<L>  /  AlkPhos  55  09-05    ABG - ( 05 Sep 2020 01:28 )  pH, Arterial: 7.55  pH, Blood: x     /  pCO2: 35    /  pO2: 280   / HCO3: 29    / Base Excess: 6.5   /  SaO2: 100                 Electrolytes repleated to goals as follows: Potassium 4, Phosphorus 3 and Magnesium 2 where appropriate and necessary    Exam:  Neuro: Sedated  HEENT: PERRL < EOMI, MMM  Neck: Supple, tracheostomy in place, no surrounding erythema or ecchymosis. Flap incision c/d/i with sutures.   CV: RRR, no MRGs  Pulm: CTAB, no wheezes, rales, or rhonchi  Abd: BS (+), Soft, NT, ND  Ext: No edema peripherally or centrally  Vasc: Extremities warm to palpation, 2+ pulses - radial and PT  MSK: no joint swelling  Skin: no rash

## 2020-09-07 LAB
GLUCOSE BLDC GLUCOMTR-MCNC: 152 MG/DL — HIGH (ref 70–99)
GLUCOSE BLDC GLUCOMTR-MCNC: 222 MG/DL — HIGH (ref 70–99)
GLUCOSE BLDC GLUCOMTR-MCNC: 236 MG/DL — HIGH (ref 70–99)
GLUCOSE BLDC GLUCOMTR-MCNC: 242 MG/DL — HIGH (ref 70–99)

## 2020-09-07 PROCEDURE — 74176 CT ABD & PELVIS W/O CONTRAST: CPT | Mod: 26

## 2020-09-07 PROCEDURE — 99232 SBSQ HOSP IP/OBS MODERATE 35: CPT | Mod: GC

## 2020-09-07 RX ADMIN — CHLORHEXIDINE GLUCONATE 1 APPLICATION(S): 213 SOLUTION TOPICAL at 05:47

## 2020-09-07 RX ADMIN — HYDROMORPHONE HYDROCHLORIDE 1 MILLIGRAM(S): 2 INJECTION INTRAMUSCULAR; INTRAVENOUS; SUBCUTANEOUS at 11:44

## 2020-09-07 RX ADMIN — HYDROMORPHONE HYDROCHLORIDE 1 MILLIGRAM(S): 2 INJECTION INTRAMUSCULAR; INTRAVENOUS; SUBCUTANEOUS at 03:06

## 2020-09-07 RX ADMIN — INSULIN GLARGINE 10 UNIT(S): 100 INJECTION, SOLUTION SUBCUTANEOUS at 07:13

## 2020-09-07 RX ADMIN — Medication 4: at 17:27

## 2020-09-07 RX ADMIN — HYDROMORPHONE HYDROCHLORIDE 1 MILLIGRAM(S): 2 INJECTION INTRAMUSCULAR; INTRAVENOUS; SUBCUTANEOUS at 12:12

## 2020-09-07 RX ADMIN — Medication 100 MILLIGRAM(S): at 17:57

## 2020-09-07 RX ADMIN — HYDROMORPHONE HYDROCHLORIDE 1 MILLIGRAM(S): 2 INJECTION INTRAMUSCULAR; INTRAVENOUS; SUBCUTANEOUS at 17:26

## 2020-09-07 RX ADMIN — HYDROMORPHONE HYDROCHLORIDE 1 MILLIGRAM(S): 2 INJECTION INTRAMUSCULAR; INTRAVENOUS; SUBCUTANEOUS at 23:39

## 2020-09-07 RX ADMIN — Medication 100 MILLIGRAM(S): at 00:08

## 2020-09-07 RX ADMIN — Medication 2: at 06:53

## 2020-09-07 RX ADMIN — HEPARIN SODIUM 5000 UNIT(S): 5000 INJECTION INTRAVENOUS; SUBCUTANEOUS at 07:01

## 2020-09-07 RX ADMIN — Medication 650 MILLIGRAM(S): at 21:35

## 2020-09-07 RX ADMIN — Medication 100 MILLIGRAM(S): at 17:11

## 2020-09-07 RX ADMIN — Medication 81 MILLIGRAM(S): at 11:21

## 2020-09-07 RX ADMIN — AMLODIPINE BESYLATE 10 MILLIGRAM(S): 2.5 TABLET ORAL at 11:14

## 2020-09-07 RX ADMIN — Medication 650 MILLIGRAM(S): at 23:39

## 2020-09-07 RX ADMIN — PANTOPRAZOLE SODIUM 40 MILLIGRAM(S): 20 TABLET, DELAYED RELEASE ORAL at 11:21

## 2020-09-07 RX ADMIN — HYDROMORPHONE HYDROCHLORIDE 1 MILLIGRAM(S): 2 INJECTION INTRAMUSCULAR; INTRAVENOUS; SUBCUTANEOUS at 04:40

## 2020-09-07 RX ADMIN — CARVEDILOL PHOSPHATE 25 MILLIGRAM(S): 80 CAPSULE, EXTENDED RELEASE ORAL at 17:12

## 2020-09-07 RX ADMIN — Medication 4: at 11:20

## 2020-09-07 RX ADMIN — Medication 100 MILLIGRAM(S): at 07:01

## 2020-09-07 RX ADMIN — Medication 75 MICROGRAM(S): at 05:47

## 2020-09-07 RX ADMIN — HEPARIN SODIUM 5000 UNIT(S): 5000 INJECTION INTRAVENOUS; SUBCUTANEOUS at 00:08

## 2020-09-07 RX ADMIN — TAMSULOSIN HYDROCHLORIDE 0.4 MILLIGRAM(S): 0.4 CAPSULE ORAL at 21:35

## 2020-09-07 RX ADMIN — HYDROMORPHONE HYDROCHLORIDE 1 MILLIGRAM(S): 2 INJECTION INTRAMUSCULAR; INTRAVENOUS; SUBCUTANEOUS at 17:54

## 2020-09-07 RX ADMIN — HYDROMORPHONE HYDROCHLORIDE 1 MILLIGRAM(S): 2 INJECTION INTRAMUSCULAR; INTRAVENOUS; SUBCUTANEOUS at 23:06

## 2020-09-07 RX ADMIN — Medication 4: at 22:01

## 2020-09-07 RX ADMIN — HEPARIN SODIUM 5000 UNIT(S): 5000 INJECTION INTRAVENOUS; SUBCUTANEOUS at 17:11

## 2020-09-07 RX ADMIN — CARVEDILOL PHOSPHATE 25 MILLIGRAM(S): 80 CAPSULE, EXTENDED RELEASE ORAL at 05:47

## 2020-09-07 NOTE — PROGRESS NOTE ADULT - SUBJECTIVE AND OBJECTIVE BOX
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 s/p L hemigloss, L ND, R ALT FF, NGT, trach 8LPC     Hospital Course:  9/2: weaned off pressors in SICU. soft pressures s/p bolus of albumin and PRBC 1 unit each. otherwise stable. no sedation and no pressors during am rounds. weaned to CPAP 30%. flap doing well with strong signal. s/p 300 rectal ASA. on abx  9/3: NAEON, flap doing well. pressures stable with MAPs >65. hgb 6.7, s/p 1u PRBC > r/p hgb 8. HD tomorrow per renal. PT unable to see today b/c anemia  9/4: NAEON. flap doing well. pressures stable with MAPs >65. HD today per renal. PT today  9/5: NAEON. had flap compromise yesterday late afternoon (no venous signal, flap dusky and no bleeding pinprick). s/p RTOR with flap debridement: left neck incision opened. arterial and venous anastomosis found to be intact with good flow on doppler.  ALT flap trimmed to bleeding tissue and anterior mouth packed with gauze.  left neck incision closed with interrupted nylon suture. overnight, hgb drop 6.9 s/p transfusion > hgb 7.5. venous and arterial signals good in the am.   9/6: NAEON. Bleeding with mild scratching on flap. Otherwise doing well. Pain controlled. Breathing well. Will stepdown today. Cuff deflated yesterday.  9/7: NAEON. Bleeding with mild scratching on flap.Pain controlled. Breathing well. In stepdown today.    O  Intermittently febrile (Tmax 101.7 @ 10pm) oVSS  General: AAOx3  Pulm: Nonlabored breathing, no respiratory distress. on TC 40%  Extrem: WWP, no edema. R thigh x2 JPs SS drainage. incision c/d/i on R thigh  HEENT: neck incision c/d/i, PEBBLES L neck SS, neck soft and flat. external flap doppler signal is strong for arterial. cook doppler for venous signal is wnl. intraoral flap has exposed muscle, which has BRB on gentle manipulation. sutures intact. gauze packing to anterior FOM     A/P: 64M w/ pmhx of ESRD, HTN, DM and T3N1M0 left tongue SCCa now s/p left hemiglossectomy, left modified radical neck dissection, right ALT free flap and tracheotomy. s/p L hemigloss, L ND, R ALT FF, NGT, trach 8LPC. s/p RTOR on 9/5 flap debridement of devascularized tissue  - intermittently febrile, will check CBC tomorrow   - q4 nursing flap checks - visual to see redness and color of flap, plus doppler/ signals  - HD on 9/8  - may continue with NGT feeds. will d/w IR tomorrow for PEG placement   - ASA 81/sqh  - OOBTC  - avoid pressors, and maintain MAP 65+  - regular trach suctioning and cleaning of inner cannula  - monitor PEBBLES output  - Page ENT with questions/concerns

## 2020-09-07 NOTE — PROGRESS NOTE ADULT - ATTENDING COMMENTS
I independently performed the key portions of the evaluation and management service provided. I agree with the above history, physical, and plan which I have reviewed and edited where appropriate. I find ESRD, dialysis today.  Continue dialysis. See full note. (Patient seen earlier in day.)

## 2020-09-07 NOTE — PROGRESS NOTE ADULT - ASSESSMENT
63 yo M w/ pmhx of ESRD, HTN, DM and T3N1M0 left tongue SCCa now s/p left hemiglossectomy, left modified radical neck dissection, right ALT free flap and tracheotomy. Nephrology consulted for dialysis. Pt seen on dialysis.    # ESRD on HD TTS via LUE AVF   - last HD today 9/5  - daily weights  - renal diet  - strict I/O   No labs today    # HTN  - UF with HD    # Renal bone disease  - phos, calcium noted, acceptable     # Anemia  - s/p hemotransfusions  - monitor H/H  - no DANIEL in setting of malignancy

## 2020-09-07 NOTE — PROGRESS NOTE ADULT - SUBJECTIVE AND OBJECTIVE BOX
Patient is a 64y Male seen and evaluated at bedside. NAD.    Meds:    acetaminophen    Suspension .. 650 every 6 hours PRN  amLODIPine   Tablet 10 daily  aspirin  chewable 81 daily  carvedilol 25 every 12 hours  ceFAZolin   IVPB 1000 every 24 hours  chlorhexidine 2% Cloths 1 daily  dextrose 40% Gel 15 once PRN  dextrose 5%. 1000 <Continuous>  dextrose 50% Injectable 12.5 once  dextrose 50% Injectable 25 once  dextrose 50% Injectable 25 once  fentaNYL    Injectable 50 every 3 hours PRN  glucagon  Injectable 1 once PRN  heparin   Injectable 5000 every 8 hours  HYDROmorphone  Injectable 0.5 every 4 hours PRN  HYDROmorphone  Injectable 1 every 4 hours PRN  insulin glargine Injectable (LANTUS) 10 every morning  insulin lispro (HumaLOG) corrective regimen sliding scale  Before meals and at bedtime  levothyroxine 75 daily  metroNIDAZOLE  IVPB 500 every 8 hours  pantoprazole  Injectable 40 daily  tamsulosin 0.4 at bedtime      T(C): , Max: 38.7 (09-06-20 @ 22:13)  T(F): , Max: 101.7 (09-06-20 @ 22:13)  HR: 82 (09-07-20 @ 11:00)  BP: 152/69 (09-07-20 @ 11:00)  BP(mean): 99 (09-07-20 @ 11:00)  RR: 20 (09-07-20 @ 11:00)  SpO2: 98% (09-07-20 @ 11:00)  Wt(kg): --    09-06 @ 07:01  -  09-07 @ 07:00  --------------------------------------------------------  IN: 1061 mL / OUT: 262 mL / NET: 799 mL    09-07 @ 07:01  -  09-07 @ 16:35  --------------------------------------------------------  IN: 474 mL / OUT: 0 mL / NET: 474 mL          Review of Systems: No SOB, cp, leg swelling      PHYSICAL EXAM:  GENERAL: no acute distress at present, NGT  NECK: No JVD, trach collar  CHEST/LUNG: equal breath sounds bilaterally  HEART: normal S1S2, RRR  ABDOMEN: Soft, Nontender  EXTREMITIES: No LE edema bilateral  ACCESS: LUE AVF, good thrill and bruit appreciated    LABS:                        8.1    7.58  )-----------( 176      ( 06 Sep 2020 05:48 )             24.9     09-06    137  |  97  |  43<H>  ----------------------------<  113<H>  3.8   |  28  |  4.21<H>    Ca    8.3<L>      06 Sep 2020 05:48  Phos  3.2     09-06  Mg     2.4     09-06                  RADIOLOGY & ADDITIONAL STUDIES:

## 2020-09-08 LAB
ALBUMIN SERPL ELPH-MCNC: 2.5 G/DL — LOW (ref 3.3–5)
ALP SERPL-CCNC: 85 U/L — SIGNIFICANT CHANGE UP (ref 40–120)
ALT FLD-CCNC: <5 U/L — LOW (ref 10–45)
ANION GAP SERPL CALC-SCNC: 18 MMOL/L — HIGH (ref 5–17)
AST SERPL-CCNC: 18 U/L — SIGNIFICANT CHANGE UP (ref 10–40)
BASE EXCESS BLDA CALC-SCNC: SIGNIFICANT CHANGE UP MMOL/L (ref -2–3)
BASOPHILS # BLD AUTO: 0 K/UL — SIGNIFICANT CHANGE UP (ref 0–0.2)
BASOPHILS NFR BLD AUTO: 0 % — SIGNIFICANT CHANGE UP (ref 0–2)
BILIRUB SERPL-MCNC: 0.2 MG/DL — SIGNIFICANT CHANGE UP (ref 0.2–1.2)
BUN SERPL-MCNC: 109 MG/DL — HIGH (ref 7–23)
CALCIUM SERPL-MCNC: 9.5 MG/DL — SIGNIFICANT CHANGE UP (ref 8.4–10.5)
CHLORIDE SERPL-SCNC: 93 MMOL/L — LOW (ref 96–108)
CO2 SERPL-SCNC: 23 MMOL/L — SIGNIFICANT CHANGE UP (ref 22–31)
CREAT SERPL-MCNC: 7.89 MG/DL — HIGH (ref 0.5–1.3)
EOSINOPHIL # BLD AUTO: 0.26 K/UL — SIGNIFICANT CHANGE UP (ref 0–0.5)
EOSINOPHIL NFR BLD AUTO: 2.7 % — SIGNIFICANT CHANGE UP (ref 0–6)
GAS PNL BLDA: SIGNIFICANT CHANGE UP
GLUCOSE BLDC GLUCOMTR-MCNC: 117 MG/DL — HIGH (ref 70–99)
GLUCOSE BLDC GLUCOMTR-MCNC: 127 MG/DL — HIGH (ref 70–99)
GLUCOSE BLDC GLUCOMTR-MCNC: 163 MG/DL — HIGH (ref 70–99)
GLUCOSE BLDC GLUCOMTR-MCNC: 214 MG/DL — HIGH (ref 70–99)
GLUCOSE SERPL-MCNC: 165 MG/DL — HIGH (ref 70–99)
HCO3 BLDA-SCNC: SIGNIFICANT CHANGE UP MMOL/L (ref 21–28)
HCT VFR BLD CALC: 24 % — LOW (ref 39–50)
HGB BLD-MCNC: 7.8 G/DL — LOW (ref 13–17)
LYMPHOCYTES # BLD AUTO: 0.33 K/UL — LOW (ref 1–3.3)
LYMPHOCYTES # BLD AUTO: 3.5 % — LOW (ref 13–44)
MAGNESIUM SERPL-MCNC: 2.8 MG/DL — HIGH (ref 1.6–2.6)
MCHC RBC-ENTMCNC: 28.5 PG — SIGNIFICANT CHANGE UP (ref 27–34)
MCHC RBC-ENTMCNC: 32.5 GM/DL — SIGNIFICANT CHANGE UP (ref 32–36)
MCV RBC AUTO: 87.6 FL — SIGNIFICANT CHANGE UP (ref 80–100)
MONOCYTES # BLD AUTO: 1.09 K/UL — HIGH (ref 0–0.9)
MONOCYTES NFR BLD AUTO: 11.5 % — SIGNIFICANT CHANGE UP (ref 2–14)
NEUTROPHILS # BLD AUTO: 7.79 K/UL — HIGH (ref 1.8–7.4)
NEUTROPHILS NFR BLD AUTO: 82.3 % — HIGH (ref 43–77)
PCO2 BLDA: SIGNIFICANT CHANGE UP MMHG (ref 35–48)
PH BLDA: SIGNIFICANT CHANGE UP (ref 7.35–7.45)
PHOSPHATE SERPL-MCNC: 3.8 MG/DL — SIGNIFICANT CHANGE UP (ref 2.5–4.5)
PLATELET # BLD AUTO: 247 K/UL — SIGNIFICANT CHANGE UP (ref 150–400)
PO2 BLDA: SIGNIFICANT CHANGE UP MMHG (ref 83–108)
POTASSIUM SERPL-MCNC: 4.6 MMOL/L — SIGNIFICANT CHANGE UP (ref 3.5–5.3)
POTASSIUM SERPL-SCNC: 4.6 MMOL/L — SIGNIFICANT CHANGE UP (ref 3.5–5.3)
PROT SERPL-MCNC: 5.6 G/DL — LOW (ref 6–8.3)
RBC # BLD: 2.74 M/UL — LOW (ref 4.2–5.8)
RBC # FLD: 15.2 % — HIGH (ref 10.3–14.5)
SAO2 % BLDA: SIGNIFICANT CHANGE UP % (ref 95–100)
SODIUM SERPL-SCNC: 134 MMOL/L — LOW (ref 135–145)
WBC # BLD: 9.46 K/UL — SIGNIFICANT CHANGE UP (ref 3.8–10.5)
WBC # FLD AUTO: 9.46 K/UL — SIGNIFICANT CHANGE UP (ref 3.8–10.5)

## 2020-09-08 PROCEDURE — 49440 PLACE GASTROSTOMY TUBE PERC: CPT

## 2020-09-08 PROCEDURE — 90935 HEMODIALYSIS ONE EVALUATION: CPT

## 2020-09-08 RX ORDER — ALBUMIN HUMAN 25 %
50 VIAL (ML) INTRAVENOUS
Refills: 0 | Status: COMPLETED | OUTPATIENT
Start: 2020-09-08 | End: 2020-09-08

## 2020-09-08 RX ADMIN — HYDROMORPHONE HYDROCHLORIDE 0.5 MILLIGRAM(S): 2 INJECTION INTRAMUSCULAR; INTRAVENOUS; SUBCUTANEOUS at 12:00

## 2020-09-08 RX ADMIN — HEPARIN SODIUM 5000 UNIT(S): 5000 INJECTION INTRAVENOUS; SUBCUTANEOUS at 18:40

## 2020-09-08 RX ADMIN — Medication 4: at 18:24

## 2020-09-08 RX ADMIN — HYDROMORPHONE HYDROCHLORIDE 0.5 MILLIGRAM(S): 2 INJECTION INTRAMUSCULAR; INTRAVENOUS; SUBCUTANEOUS at 11:37

## 2020-09-08 RX ADMIN — PANTOPRAZOLE SODIUM 40 MILLIGRAM(S): 20 TABLET, DELAYED RELEASE ORAL at 14:19

## 2020-09-08 RX ADMIN — Medication 100 MILLIGRAM(S): at 07:04

## 2020-09-08 RX ADMIN — CARVEDILOL PHOSPHATE 25 MILLIGRAM(S): 80 CAPSULE, EXTENDED RELEASE ORAL at 18:40

## 2020-09-08 RX ADMIN — HYDROMORPHONE HYDROCHLORIDE 0.5 MILLIGRAM(S): 2 INJECTION INTRAMUSCULAR; INTRAVENOUS; SUBCUTANEOUS at 21:56

## 2020-09-08 RX ADMIN — HYDROMORPHONE HYDROCHLORIDE 0.5 MILLIGRAM(S): 2 INJECTION INTRAMUSCULAR; INTRAVENOUS; SUBCUTANEOUS at 21:42

## 2020-09-08 RX ADMIN — Medication 100 MILLIGRAM(S): at 00:06

## 2020-09-08 RX ADMIN — Medication 100 MILLIGRAM(S): at 18:39

## 2020-09-08 RX ADMIN — Medication 2: at 06:36

## 2020-09-08 RX ADMIN — CARVEDILOL PHOSPHATE 25 MILLIGRAM(S): 80 CAPSULE, EXTENDED RELEASE ORAL at 05:57

## 2020-09-08 RX ADMIN — HYDROMORPHONE HYDROCHLORIDE 1 MILLIGRAM(S): 2 INJECTION INTRAMUSCULAR; INTRAVENOUS; SUBCUTANEOUS at 03:17

## 2020-09-08 RX ADMIN — Medication 81 MILLIGRAM(S): at 14:20

## 2020-09-08 RX ADMIN — CHLORHEXIDINE GLUCONATE 1 APPLICATION(S): 213 SOLUTION TOPICAL at 05:57

## 2020-09-08 RX ADMIN — AMLODIPINE BESYLATE 10 MILLIGRAM(S): 2.5 TABLET ORAL at 14:19

## 2020-09-08 RX ADMIN — HEPARIN SODIUM 5000 UNIT(S): 5000 INJECTION INTRAVENOUS; SUBCUTANEOUS at 00:19

## 2020-09-08 RX ADMIN — Medication 75 MICROGRAM(S): at 05:57

## 2020-09-08 NOTE — PROGRESS NOTE ADULT - ASSESSMENT
64M w/ left tongue SCC now s/p left hemiglossectomy, left modified radical neck dissection, right ALT free flap and tracheotomy.  Pt tolerated procedure well and was transferred to the SICU in stable condition. RTOR on 9/4 for dusky flap, then was transferred to tele          On Trach collar, vitals are stable    Surgery was consulted for possible PEG  Dr. Fagan recommended that given his pathology EGD for PEG placement is not feasible and recommended IR PEG insertion    The patient just had his PEG inserted by IR successfully    Discussed with Chief Resident and Dr. Tomas    Surgery now will sign off, please feel free to contact us with any questions or concerns

## 2020-09-08 NOTE — PROGRESS NOTE ADULT - ATTENDING COMMENTS
CKD 5 progressed to ESRD with volume overload and uremia in March 2020, now dialyzing TIW via AVF  Tolerated 2L UF well on HD today, accounting for 2uPRBCs received  Given active malignancy - please d/w his hematologist if giving an DANIEL for his anemia is advisable.

## 2020-09-08 NOTE — PROGRESS NOTE ADULT - ASSESSMENT
63 yo M w/ pmhx of ESRD, HTN, DM and T3N1M0 left tongue SCCa now s/p left hemiglossectomy, left modified radical neck dissection, right ALT free flap and tracheotomy. Nephrology consulted for dialysis arrangement.      # ESRD on HD TTS via LUE AVF   - getting HD today as per schedule  - daily weights  - renal diet  - strict I/O     # HTN  - UF with HD    # Renal bone disease  - phos, calcium noted, acceptable   - no need for binders    # Anemia  - s/p hemotransfusions  - Hb 7.8 g/dl, stable, monitor  - no DANIEL in setting of malignancy 65 yo M w/ pmhx of ESRD, HTN, DM and T3N1M0 left tongue SCCa now s/p left hemiglossectomy, left modified radical neck dissection, right ALT free flap and tracheotomy. Nephrology consulted for dialysis arrangement.      # ESRD on HD TTS via LUE AVF   - getting HD today as per schedule  - daily weights  - renal diet  - strict I/O     # HTN  - UF with HD    # Renal bone disease  - phos, calcium noted, acceptable   - no need for binders    # Anemia  - s/p hemotransfusions  - Hb 7.8 g/dl, plan for 1u pRBC w HD   - no DANIEL in setting of malignancy

## 2020-09-08 NOTE — PROGRESS NOTE ADULT - SUBJECTIVE AND OBJECTIVE BOX
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 s/p L hemigloss, L ND, R ALT FF, NGT, trach 8LPC     Hospital Course:  9/2: weaned off pressors in SICU. soft pressures s/p bolus of albumin and PRBC 1 unit each. otherwise stable. no sedation and no pressors during am rounds. weaned to CPAP 30%. flap doing well with strong signal. s/p 300 rectal ASA. on abx  9/3: NAEON, flap doing well. pressures stable with MAPs >65. hgb 6.7, s/p 1u PRBC > r/p hgb 8. HD tomorrow per renal. PT unable to see today b/c anemia  9/4: NAEON. flap doing well. pressures stable with MAPs >65. HD today per renal. PT today  9/5: NAEON. had flap compromise yesterday late afternoon (no venous signal, flap dusky and no bleeding pinprick). s/p RTOR with flap debridement: left neck incision opened. arterial and venous anastomosis found to be intact with good flow on doppler.  ALT flap trimmed to bleeding tissue and anterior mouth packed with gauze.  left neck incision closed with interrupted nylon suture. overnight, hgb drop 6.9 s/p transfusion > hgb 7.5. venous and arterial signals good in the am.   9/6: NAEON. Bleeding with mild scratching on flap. Otherwise doing well. Pain controlled. Breathing well. Will stepdown today. Cuff deflated yesterday.  9/7: NAEON. Bleeding with mild scratching on flap.Pain controlled. Breathing well. In stepdown today.  9/8: NAEON. Bleeding on mild scratching. Pain controlled and breathing well. PEG today with IR.    PAST MEDICAL & SURGICAL HISTORY:  Hypothyroidism  Lower extremity edema  Hyperlipidemia  BPH (benign prostatic hyperplasia)  Type II diabetes mellitus  Essential hypertension  Chronic kidney disease (CKD), stage V: Hemodialysis  AV fistula: left UE  S/P appendectomy    No Known Allergies    MEDICATIONS  (STANDING):  albumin human 25% IVPB 50 milliLiter(s) IV Intermittent every 1 hour  amLODIPine   Tablet 10 milliGRAM(s) Oral daily  aspirin  chewable 81 milliGRAM(s) Oral daily  carvedilol 25 milliGRAM(s) Oral every 12 hours  ceFAZolin   IVPB 1000 milliGRAM(s) IV Intermittent every 24 hours  chlorhexidine 2% Cloths 1 Application(s) Topical daily  dextrose 5%. 1000 milliLiter(s) (50 mL/Hr) IV Continuous <Continuous>  dextrose 50% Injectable 12.5 Gram(s) IV Push once  dextrose 50% Injectable 25 Gram(s) IV Push once  dextrose 50% Injectable 25 Gram(s) IV Push once  heparin   Injectable 5000 Unit(s) SubCutaneous every 8 hours  insulin glargine Injectable (LANTUS) 10 Unit(s) SubCutaneous every morning  insulin lispro (HumaLOG) corrective regimen sliding scale   SubCutaneous Before meals and at bedtime  levothyroxine 75 MICROGram(s) Oral daily  metroNIDAZOLE  IVPB 500 milliGRAM(s) IV Intermittent every 8 hours  pantoprazole  Injectable 40 milliGRAM(s) IV Push daily  tamsulosin 0.4 milliGRAM(s) Oral at bedtime    MEDICATIONS  (PRN):  acetaminophen    Suspension .. 650 milliGRAM(s) Oral every 6 hours PRN Temp greater or equal to 38C (100.4F)  dextrose 40% Gel 15 Gram(s) Oral once PRN Blood Glucose LESS THAN 70 milliGRAM(s)/deciliter  fentaNYL    Injectable 50 MICROGram(s) IV Push every 3 hours PRN For pain and aggitation  glucagon  Injectable 1 milliGRAM(s) IntraMuscular once PRN Glucose LESS THAN 70 milligrams/deciliter  HYDROmorphone  Injectable 0.5 milliGRAM(s) IV Push every 4 hours PRN Moderate Pain (4 - 6)  HYDROmorphone  Injectable 1 milliGRAM(s) IV Push every 4 hours PRN Severe Pain (7 - 10)      Objective    ICU Vital Signs Last 24 Hrs  T(C): 36.9 (08 Sep 2020 09:15), Max: 37.9 (07 Sep 2020 17:25)  T(F): 98.4 (08 Sep 2020 09:15), Max: 100.2 (07 Sep 2020 17:25)  HR: 78 (08 Sep 2020 09:26) (75 - 95)  BP: 139/65 (08 Sep 2020 09:10) (132/62 - 160/74)  BP(mean): 94 (08 Sep 2020 09:10) (94 - 106)  RR: 17 (08 Sep 2020 09:26) (17 - 220)  SpO2: 100% (08 Sep 2020 09:26) (96% - 100%)          Intermittently febrile (Tmax 101.7 @ 10pm) oVSS  General: AAOx3  Pulm: Nonlabored breathing, no respiratory distress. on TC 40%  Extrem: WWP, no edema. R thigh PEBBLES SS drainage. incision c/d/i on R thigh  HEENT: neck incision c/d/i, PEBBLES L neck SS, neck soft and flat. external flap doppler signal is strong for arterial. cook doppler for venous signal is wnl. intraoral flap has exposed muscle, which has BRB on gentle manipulation. sutures intact. gauze packing to anterior FOM                           7.8    9.46  )-----------( 247      ( 08 Sep 2020 08:00 )             24.0     09-08    134<L>  |  93<L>  |  109<H>  ----------------------------<  165<H>  4.6   |  23  |  7.89<H>    Ca    9.5      08 Sep 2020 08:00  Phos  3.8     09-08  Mg     2.8     09-08    TPro  5.6<L>  /  Alb  2.5<L>  /  TBili  0.2  /  DBili  x   /  AST  18  /  ALT  <5<L>  /  AlkPhos  85  09-08      I&O's Summary    07 Sep 2020 07:01  -  08 Sep 2020 07:00  --------------------------------------------------------  IN: 1288 mL / OUT: 320 mL / NET: 968 mL        A/P: 64M w/ pmhx of ESRD, HTN, DM and T3N1M0 left tongue SCCa now s/p left hemiglossectomy, left modified radical neck dissection, right ALT free flap and tracheotomy. s/p L hemigloss, L ND, R ALT FF, NGT, trach 8LPC. s/p RTOR on 9/5 flap debridement of devascularized tissue  - intermittently febrile, will f/u CBC  - q4 nursing flap checks - visual to see redness and color of flap, plus doppler/ signals  - HD on 9/8  - holding tube feeds for PEG today  - ASA 81/sqh  - OOBTC  - avoid pressors, and maintain MAP 65+  - regular trach suctioning and cleaning of inner cannula  - monitor PEBBLES output  - Page ENT with questions/concerns

## 2020-09-08 NOTE — PROGRESS NOTE ADULT - SUBJECTIVE AND OBJECTIVE BOX
Patient was seen and evaluated on dialysis.         HR: 78 (09-08-20 @ 09:26)  BP: 139/65 (09-08-20 @ 09:10)  Wt(kg): bed scale      PHYSICAL EXAM:  GENERAL: alert, no acute distress at present  NECK: No JVD, trach in place, FiO2 at 40%  CHEST/LUNG: equal breath sounds bilaterally  HEART: normal S1S2, RRR  ABDOMEN: Soft, Nontender, +BS, No flank tenderness  EXTREMITIES: No LE edema bilateral  Neurology: AAOx3, no focal neurological deficit  SKIN: No rashes  ACCESS: LUE AVF, good thrill and bruit appreciated      Labs:  09-08    134<L>  |  93<L>  |  109<H>  ----------------------------<  165<H>  4.6   |  23  |  7.89<H>    Ca    9.5      08 Sep 2020 08:00  Phos  3.8     09-08  Mg     2.8     09-08    TPro  5.6<L>  /  Alb  2.5<L>  /  TBili  0.2  /  DBili  x   /  AST  18  /  ALT  <5<L>  /  AlkPhos  85  09-08    Continue dialysis:   Dialyzer:   180    QB: 400  K bath: 2  Goal UF:    1.5 - 2.0   L   over       180        min  Patient is tolerating the procedure well.   Continue full treatment as prescribed. Patient was seen and evaluated on dialysis  No active complaints at present  No acute events overnight  No active bleeding, Hb 7.8 g/dl  Plan for 1u pRBC during HD    Vitals:  HR: 78 (09-08-20 @ 09:26)  BP: 139/65 (09-08-20 @ 09:10)  Wt(kg): bed scale      PHYSICAL EXAM:  GENERAL: alert, no acute distress at present  NECK: No JVD, trach in place, FiO2 at 40%  CHEST/LUNG: equal breath sounds bilaterally  HEART: normal S1S2, RRR  ABDOMEN: Soft, Nontender, +BS, No flank tenderness  EXTREMITIES: No LE edema bilateral  Neurology: AAOx3, no focal neurological deficit  SKIN: No rashes  ACCESS: LUE AVF, good thrill and bruit appreciated      Labs:  09-08    134<L>  |  93<L>  |  109<H>  ----------------------------<  165<H>  4.6   |  23  |  7.89<H>    Ca    9.5      08 Sep 2020 08:00  Phos  3.8     09-08  Mg     2.8     09-08    TPro  5.6<L>  /  Alb  2.5<L>  /  TBili  0.2  /  DBili  x   /  AST  18  /  ALT  <5<L>  /  AlkPhos  85  09-08    Continue dialysis:   Dialyzer:   180    QB: 400  K bath: 2  Goal UF:    1.5 - 2.0   L   over       180        min  Patient is tolerating the procedure well.   Continue full treatment as prescribed.

## 2020-09-08 NOTE — PROGRESS NOTE ADULT - SUBJECTIVE AND OBJECTIVE BOX
No changes overnight.  Surgery on board for gastrostomy tube if IR PEG insertion was not feasible.    MEDICATIONS  (STANDING):  amLODIPine   Tablet 10 milliGRAM(s) Oral daily  aspirin  chewable 81 milliGRAM(s) Oral daily  carvedilol 25 milliGRAM(s) Oral every 12 hours  ceFAZolin   IVPB 1000 milliGRAM(s) IV Intermittent every 24 hours  chlorhexidine 2% Cloths 1 Application(s) Topical daily  dextrose 5%. 1000 milliLiter(s) (50 mL/Hr) IV Continuous <Continuous>  dextrose 50% Injectable 12.5 Gram(s) IV Push once  dextrose 50% Injectable 25 Gram(s) IV Push once  dextrose 50% Injectable 25 Gram(s) IV Push once  heparin   Injectable 5000 Unit(s) SubCutaneous every 8 hours  insulin glargine Injectable (LANTUS) 10 Unit(s) SubCutaneous every morning  insulin lispro (HumaLOG) corrective regimen sliding scale   SubCutaneous Before meals and at bedtime  levothyroxine 75 MICROGram(s) Oral daily  metroNIDAZOLE  IVPB 500 milliGRAM(s) IV Intermittent every 8 hours  pantoprazole  Injectable 40 milliGRAM(s) IV Push daily  tamsulosin 0.4 milliGRAM(s) Oral at bedtime    MEDICATIONS  (PRN):  acetaminophen    Suspension .. 650 milliGRAM(s) Oral every 6 hours PRN Temp greater or equal to 38C (100.4F)  dextrose 40% Gel 15 Gram(s) Oral once PRN Blood Glucose LESS THAN 70 milliGRAM(s)/deciliter  fentaNYL    Injectable 50 MICROGram(s) IV Push every 3 hours PRN For pain and aggitation  glucagon  Injectable 1 milliGRAM(s) IntraMuscular once PRN Glucose LESS THAN 70 milligrams/deciliter  HYDROmorphone  Injectable 0.5 milliGRAM(s) IV Push every 4 hours PRN Moderate Pain (4 - 6)  HYDROmorphone  Injectable 1 milliGRAM(s) IV Push every 4 hours PRN Severe Pain (7 - 10)      I&O's Detail    07 Sep 2020 07:01  -  08 Sep 2020 07:00  --------------------------------------------------------  IN:    IV PiggyBack: 100 mL    Nepro with Carb Steady: 1188 mL  Total IN: 1288 mL    OUT:    Drain: 60 mL    Drain: 35 mL    Incontinent per Condom Catheter: 225 mL  Total OUT: 320 mL    Total NET: 968 mL      08 Sep 2020 07:01  -  08 Sep 2020 18:03  --------------------------------------------------------  IN:    Other: 1100 mL    Packed Red Blood Cells: 300 mL  Total IN: 1400 mL    OUT:    Drain: 2.5 mL    Drain: 20 mL    Incontinent per Condom Catheter: 50 mL    Other: 3100 mL  Total OUT: 3172.5 mL    Total NET: -1772.5 mL      Vital Signs Last 24 Hrs  T(C): 36.8 (08 Sep 2020 13:25), Max: 37.8 (07 Sep 2020 22:46)  T(F): 98.3 (08 Sep 2020 13:25), Max: 100 (07 Sep 2020 22:46)  HR: 92 (08 Sep 2020 14:07) (68 - 98)  BP: 156/70 (08 Sep 2020 14:07) (132/62 - 171/77)  BP(mean): 101 (08 Sep 2020 14:07) (94 - 110)  RR: 18 (08 Sep 2020 14:07) (17 - 20)  SpO2: 98% (08 Sep 2020 14:07) (98% - 100%)      Gen: NAD, resting comfortably in bed  Neck: Supple, tracheostomy in place, no surrounding erythema or ecchymosis. Flap incision c/d/i with sutures.  Pulm: Good inspiratory effort, nonlabored breathing  C/V: NSR  Abd: Soft, non tender, nondistended. No rebound, no guarding.   Extrem: WWP, no edema                          7.8    9.46  )-----------( 247      ( 08 Sep 2020 08:00 )             24.0     09-08    134<L>  |  93<L>  |  109<H>  ----------------------------<  165<H>  4.6   |  23  |  7.89<H>    Ca    9.5      08 Sep 2020 08:00  Phos  3.8     09-08  Mg     2.8     09-08    TPro  5.6<L>  /  Alb  2.5<L>  /  TBili  0.2  /  DBili  x   /  AST  18  /  ALT  <5<L>  /  AlkPhos  85  09-08

## 2020-09-08 NOTE — CHART NOTE - NSCHARTNOTEFT_GEN_A_CORE
Admitting Diagnosis:   Patient is a 64y old  Male who presents with a chief complaint of Post surgical care (08 Sep 2020 10:52)      PAST MEDICAL & SURGICAL HISTORY:  Hypothyroidism  Lower extremity edema  Hyperlipidemia  BPH (benign prostatic hyperplasia)  Type II diabetes mellitus  Essential hypertension  Chronic kidney disease (CKD), stage V: Hemodialysis  AV fistula: left UE  S/P appendectomy      Current Nutrition Order:  NPO w/ TF  6 cans of Nepro 1.8/day via NGT. Provides 1422ml TV, 2559kcal, 115g pro, 1033ml FW.     PO Intake: Good (%) [   ]  Fair (50-75%) [   ] Poor (<25%) [   ] N/A NPO    GI Issues:   No apparent GI distress  No N/V/D/C reported   No BM documented in flowsheet- asked RN to document  Ordered for protonix    Pain:  Pain being medically managed  Unable to fully assess    Skin Integrity:  L AV fistula  Surgical incision  Fantasma score 17    Labs:       134<L>  |  93<L>  |  109<H>  ----------------------------<  165<H>  4.6   |  23  |  7.89<H>    Ca    9.5      08 Sep 2020 08:00  Phos  3.8       Mg     2.8         TPro  5.6<L>  /  Alb  2.5<L>  /  TBili  0.2  /  DBili  x   /  AST  18  /  ALT  <5<L>  /  AlkPhos  85      CAPILLARY BLOOD GLUCOSE      POCT Blood Glucose.: 127 mg/dL (08 Sep 2020 12:16)  POCT Blood Glucose.: 163 mg/dL (08 Sep 2020 06:02)  POCT Blood Glucose.: 222 mg/dL (07 Sep 2020 21:27)  POCT Blood Glucose.: 236 mg/dL (07 Sep 2020 17:21)      Medications:  MEDICATIONS  (STANDING):  albumin human 25% IVPB 50 milliLiter(s) IV Intermittent every 1 hour  amLODIPine   Tablet 10 milliGRAM(s) Oral daily  aspirin  chewable 81 milliGRAM(s) Oral daily  carvedilol 25 milliGRAM(s) Oral every 12 hours  ceFAZolin   IVPB 1000 milliGRAM(s) IV Intermittent every 24 hours  chlorhexidine 2% Cloths 1 Application(s) Topical daily  dextrose 5%. 1000 milliLiter(s) (50 mL/Hr) IV Continuous <Continuous>  dextrose 50% Injectable 12.5 Gram(s) IV Push once  dextrose 50% Injectable 25 Gram(s) IV Push once  dextrose 50% Injectable 25 Gram(s) IV Push once  heparin   Injectable 5000 Unit(s) SubCutaneous every 8 hours  insulin glargine Injectable (LANTUS) 10 Unit(s) SubCutaneous every morning  insulin lispro (HumaLOG) corrective regimen sliding scale   SubCutaneous Before meals and at bedtime  levothyroxine 75 MICROGram(s) Oral daily  metroNIDAZOLE  IVPB 500 milliGRAM(s) IV Intermittent every 8 hours  pantoprazole  Injectable 40 milliGRAM(s) IV Push daily  tamsulosin 0.4 milliGRAM(s) Oral at bedtime    MEDICATIONS  (PRN):  acetaminophen    Suspension .. 650 milliGRAM(s) Oral every 6 hours PRN Temp greater or equal to 38C (100.4F)  dextrose 40% Gel 15 Gram(s) Oral once PRN Blood Glucose LESS THAN 70 milliGRAM(s)/deciliter  fentaNYL    Injectable 50 MICROGram(s) IV Push every 3 hours PRN For pain and aggitation  glucagon  Injectable 1 milliGRAM(s) IntraMuscular once PRN Glucose LESS THAN 70 milligrams/deciliter  HYDROmorphone  Injectable 0.5 milliGRAM(s) IV Push every 4 hours PRN Moderate Pain (4 - 6)  HYDROmorphone  Injectable 1 milliGRAM(s) IV Push every 4 hours PRN Severe Pain (7 - 10)    Admitted Anthropometrics:  Height: 5'9" Weight: 160lbs, IBW 160lbs+/-10%, %%, BMI 27.4     Weight:  Daily Height in cm: 175 (08 Sep 2020 06:43)    Daily Weight in k.1 (08 Sep 2020 10:25)    Weight Change:   No known wt changes. Weight appears stable this admission.     Estimated energy needs:   ABW (72.7kg) used for calculations as pt between % of IBW.   Nutrient needs based on [facility] standards of care for maintenance in older adults.   Needs adjusted for age, catabolic illness, post-op healing  2181-2545kcal/day (30-35kcal/kg)  101-116g pro/day (1.4-1.6g pro/kg)  Fluids per team 2/2 HD    Subjective:  64M w/ pmhx of ESRD, HTN, DM and T3N1M0 left tongue SCCa now s/p left hemiglossectomy, left modified radical neck dissection, right ALT free flap and tracheotomy. s/p L hemigloss, L ND, R ALT FF, NGT, trach 8LPC. s/p RTOR on  flap debridement of devascularized tissue. Planned for PEG today.    Pt seen in room, resting in bed, being set up for dialysis. NPO after MN for OR. Previously receiving 6 cans of Nepro 1.8/day via NGT and tolerating well. No BM documented in flowsheet and RN unsure of last BM- will f/u throughout day. No s/s intolerance with current  regimen. Lytes WNL, POCT , 222, 236mg/dL, BUN 43, Cr 4.21. See recs below. RD to follow.     Previous Nutrition Diagnosis:  inadequate oral intake RT unable to take PO 2/2 hemiglossectomy, neck dissection, and tracheotomy.   Active [ x  ]  Resolved [   ]    If resolved, new PES:     Goal:  Pt to consistently meet % of estimated needs via most appropriate route    Recommendations:  1. Recommend 5 cans of Nepro 1.8/day + 1 prostat/day via PEG. Provides 1185ml TV, 2233kcal (incl. prostat), 110g pro, 861ml FW. Pt can have 2 cans at breakfast, 1 can +1 prostat at lunch, at 2 cans at dinner.   2. Monitor for s/s intolerance. Maintain aspiration precautions at all times  3. Formal SLP eval prior to PO diet adv.   4. Pain and bowel regimen per team  *d/w team     Education:   N/A will f/u after HD    Risk Level: High [ x  ] Moderate [   ] Low [   ] Admitting Diagnosis:   Patient is a 64y old  Male who presents with a chief complaint of Post surgical care (08 Sep 2020 10:52)      PAST MEDICAL & SURGICAL HISTORY:  Hypothyroidism  Lower extremity edema  Hyperlipidemia  BPH (benign prostatic hyperplasia)  Type II diabetes mellitus  Essential hypertension  Chronic kidney disease (CKD), stage V: Hemodialysis  AV fistula: left UE  S/P appendectomy      Current Nutrition Order:  NPO w/ TF  6 cans of Nepro 1.8/day via NGT. Provides 1422ml TV, 2559kcal, 115g pro, 1033ml FW.     PO Intake: Good (%) [   ]  Fair (50-75%) [   ] Poor (<25%) [   ] N/A NPO    GI Issues:   No apparent GI distress  No N/V/D/C reported   No BM documented in flowsheet- asked RN to document  Ordered for protonix    Pain:  Pain being medically managed  Unable to fully assess    Skin Integrity:  L AV fistula  Surgical incision  Fantasma score 17    Labs:       134<L>  |  93<L>  |  109<H>  ----------------------------<  165<H>  4.6   |  23  |  7.89<H>    Ca    9.5      08 Sep 2020 08:00  Phos  3.8       Mg     2.8         TPro  5.6<L>  /  Alb  2.5<L>  /  TBili  0.2  /  DBili  x   /  AST  18  /  ALT  <5<L>  /  AlkPhos  85      CAPILLARY BLOOD GLUCOSE      POCT Blood Glucose.: 127 mg/dL (08 Sep 2020 12:16)  POCT Blood Glucose.: 163 mg/dL (08 Sep 2020 06:02)  POCT Blood Glucose.: 222 mg/dL (07 Sep 2020 21:27)  POCT Blood Glucose.: 236 mg/dL (07 Sep 2020 17:21)      Medications:  MEDICATIONS  (STANDING):  albumin human 25% IVPB 50 milliLiter(s) IV Intermittent every 1 hour  amLODIPine   Tablet 10 milliGRAM(s) Oral daily  aspirin  chewable 81 milliGRAM(s) Oral daily  carvedilol 25 milliGRAM(s) Oral every 12 hours  ceFAZolin   IVPB 1000 milliGRAM(s) IV Intermittent every 24 hours  chlorhexidine 2% Cloths 1 Application(s) Topical daily  dextrose 5%. 1000 milliLiter(s) (50 mL/Hr) IV Continuous <Continuous>  dextrose 50% Injectable 12.5 Gram(s) IV Push once  dextrose 50% Injectable 25 Gram(s) IV Push once  dextrose 50% Injectable 25 Gram(s) IV Push once  heparin   Injectable 5000 Unit(s) SubCutaneous every 8 hours  insulin glargine Injectable (LANTUS) 10 Unit(s) SubCutaneous every morning  insulin lispro (HumaLOG) corrective regimen sliding scale   SubCutaneous Before meals and at bedtime  levothyroxine 75 MICROGram(s) Oral daily  metroNIDAZOLE  IVPB 500 milliGRAM(s) IV Intermittent every 8 hours  pantoprazole  Injectable 40 milliGRAM(s) IV Push daily  tamsulosin 0.4 milliGRAM(s) Oral at bedtime    MEDICATIONS  (PRN):  acetaminophen    Suspension .. 650 milliGRAM(s) Oral every 6 hours PRN Temp greater or equal to 38C (100.4F)  dextrose 40% Gel 15 Gram(s) Oral once PRN Blood Glucose LESS THAN 70 milliGRAM(s)/deciliter  fentaNYL    Injectable 50 MICROGram(s) IV Push every 3 hours PRN For pain and aggitation  glucagon  Injectable 1 milliGRAM(s) IntraMuscular once PRN Glucose LESS THAN 70 milligrams/deciliter  HYDROmorphone  Injectable 0.5 milliGRAM(s) IV Push every 4 hours PRN Moderate Pain (4 - 6)  HYDROmorphone  Injectable 1 milliGRAM(s) IV Push every 4 hours PRN Severe Pain (7 - 10)    Admitted Anthropometrics:  Height: 5'9" Weight: 160lbs, IBW 160lbs+/-10%, %%, BMI 27.4     Weight:  Daily Height in cm: 175 (08 Sep 2020 06:43)    Daily Weight in k.1 (08 Sep 2020 10:25)    Weight Change:   No known wt changes. Weight appears stable this admission.     Estimated energy needs:   ABW (72.7kg) used for calculations as pt between % of IBW.   Nutrient needs based on [facility] standards of care for maintenance in older adults.   Needs adjusted for age, catabolic illness, post-op healing  2181-2545kcal/day (30-35kcal/kg)  101-116g pro/day (1.4-1.6g pro/kg)  Fluids per team 2/2 HD    Subjective:  64M w/ pmhx of ESRD, HTN, DM and T3N1M0 left tongue SCCa now s/p left hemiglossectomy, left modified radical neck dissection, right ALT free flap and tracheotomy. s/p L hemigloss, L ND, R ALT FF, NGT, trach 8LPC. s/p RTOR on  flap debridement of devascularized tissue. Planned for PEG today.    Pt seen in room, resting in bed, being set up for dialysis. NPO after MN for OR. Previously receiving 6 cans of Nepro 1.8/day via NGT and tolerating well. No BM documented in flowsheet and RN unsure of last BM- will f/u throughout day. No s/s intolerance with current  regimen. Lytes WNL, POCT , 222, 236mg/dL, BUN 43, Cr 4.21. See recs below. RD to follow.     Previous Nutrition Diagnosis:  inadequate oral intake RT unable to take PO 2/2 hemiglossectomy, neck dissection, and tracheotomy.   Active [ x  ]  Resolved [   ]    If resolved, new PES:     Goal:  Pt to consistently meet % of estimated needs via most appropriate route    Recommendations:  1. Continue w/ current regimen of 6 cans of Nepro 1.8/day via NGT. Provides 1422ml TV, 2559kcal, 115g pro, 1033ml FW.   Pt can have 2 cans at breakfast, 2 cans at lunch, and 2 at dinner.   2. Monitor for s/s intolerance. Maintain aspiration precautions at all times  3. Formal SLP eval prior to PO diet adv.   4. Pain and bowel regimen per team  5. Please obtain updated DRY weights for accuracy and trending  *d/w team     Education:   N/A will f/u after HD    Risk Level: High [ x  ] Moderate [   ] Low [   ]

## 2020-09-09 LAB
ANION GAP SERPL CALC-SCNC: 17 MMOL/L — SIGNIFICANT CHANGE UP (ref 5–17)
BASOPHILS # BLD AUTO: 0.03 K/UL — SIGNIFICANT CHANGE UP (ref 0–0.2)
BASOPHILS NFR BLD AUTO: 0.4 % — SIGNIFICANT CHANGE UP (ref 0–2)
BUN SERPL-MCNC: 70 MG/DL — HIGH (ref 7–23)
CALCIUM SERPL-MCNC: 9.3 MG/DL — SIGNIFICANT CHANGE UP (ref 8.4–10.5)
CHLORIDE SERPL-SCNC: 94 MMOL/L — LOW (ref 96–108)
CO2 SERPL-SCNC: 25 MMOL/L — SIGNIFICANT CHANGE UP (ref 22–31)
CREAT SERPL-MCNC: 6.09 MG/DL — HIGH (ref 0.5–1.3)
EOSINOPHIL # BLD AUTO: 0.14 K/UL — SIGNIFICANT CHANGE UP (ref 0–0.5)
EOSINOPHIL NFR BLD AUTO: 1.9 % — SIGNIFICANT CHANGE UP (ref 0–6)
GLUCOSE BLDC GLUCOMTR-MCNC: 117 MG/DL — HIGH (ref 70–99)
GLUCOSE BLDC GLUCOMTR-MCNC: 120 MG/DL — HIGH (ref 70–99)
GLUCOSE BLDC GLUCOMTR-MCNC: 125 MG/DL — HIGH (ref 70–99)
GLUCOSE BLDC GLUCOMTR-MCNC: 185 MG/DL — HIGH (ref 70–99)
GLUCOSE SERPL-MCNC: 126 MG/DL — HIGH (ref 70–99)
HCT VFR BLD CALC: 27.7 % — LOW (ref 39–50)
HGB BLD-MCNC: 9.3 G/DL — LOW (ref 13–17)
IMM GRANULOCYTES NFR BLD AUTO: 2.5 % — HIGH (ref 0–1.5)
LYMPHOCYTES # BLD AUTO: 0.76 K/UL — LOW (ref 1–3.3)
LYMPHOCYTES # BLD AUTO: 10.1 % — LOW (ref 13–44)
MAGNESIUM SERPL-MCNC: 2.6 MG/DL — SIGNIFICANT CHANGE UP (ref 1.6–2.6)
MCHC RBC-ENTMCNC: 29.2 PG — SIGNIFICANT CHANGE UP (ref 27–34)
MCHC RBC-ENTMCNC: 33.6 GM/DL — SIGNIFICANT CHANGE UP (ref 32–36)
MCV RBC AUTO: 86.8 FL — SIGNIFICANT CHANGE UP (ref 80–100)
MONOCYTES # BLD AUTO: 0.91 K/UL — HIGH (ref 0–0.9)
MONOCYTES NFR BLD AUTO: 12.1 % — SIGNIFICANT CHANGE UP (ref 2–14)
NEUTROPHILS # BLD AUTO: 5.48 K/UL — SIGNIFICANT CHANGE UP (ref 1.8–7.4)
NEUTROPHILS NFR BLD AUTO: 73 % — SIGNIFICANT CHANGE UP (ref 43–77)
NRBC # BLD: 0 /100 WBCS — SIGNIFICANT CHANGE UP (ref 0–0)
PHOSPHATE SERPL-MCNC: 4.8 MG/DL — HIGH (ref 2.5–4.5)
PLATELET # BLD AUTO: 269 K/UL — SIGNIFICANT CHANGE UP (ref 150–400)
POTASSIUM SERPL-MCNC: 4.2 MMOL/L — SIGNIFICANT CHANGE UP (ref 3.5–5.3)
POTASSIUM SERPL-SCNC: 4.2 MMOL/L — SIGNIFICANT CHANGE UP (ref 3.5–5.3)
RBC # BLD: 3.19 M/UL — LOW (ref 4.2–5.8)
RBC # FLD: 14.7 % — HIGH (ref 10.3–14.5)
SODIUM SERPL-SCNC: 136 MMOL/L — SIGNIFICANT CHANGE UP (ref 135–145)
WBC # BLD: 7.51 K/UL — SIGNIFICANT CHANGE UP (ref 3.8–10.5)
WBC # FLD AUTO: 7.51 K/UL — SIGNIFICANT CHANGE UP (ref 3.8–10.5)

## 2020-09-09 PROCEDURE — 99232 SBSQ HOSP IP/OBS MODERATE 35: CPT

## 2020-09-09 RX ORDER — LEVOTHYROXINE SODIUM 125 MCG
75 TABLET ORAL DAILY
Refills: 0 | Status: DISCONTINUED | OUTPATIENT
Start: 2020-09-10 | End: 2020-09-18

## 2020-09-09 RX ORDER — ACETAMINOPHEN 500 MG
650 TABLET ORAL EVERY 6 HOURS
Refills: 0 | Status: DISCONTINUED | OUTPATIENT
Start: 2020-09-09 | End: 2020-09-18

## 2020-09-09 RX ORDER — OXYCODONE HYDROCHLORIDE 5 MG/1
10 TABLET ORAL EVERY 6 HOURS
Refills: 0 | Status: DISCONTINUED | OUTPATIENT
Start: 2020-09-09 | End: 2020-09-16

## 2020-09-09 RX ORDER — DOXAZOSIN MESYLATE 4 MG
1 TABLET ORAL AT BEDTIME
Refills: 0 | Status: DISCONTINUED | OUTPATIENT
Start: 2020-09-09 | End: 2020-09-18

## 2020-09-09 RX ORDER — AMLODIPINE BESYLATE 2.5 MG/1
10 TABLET ORAL ONCE
Refills: 0 | Status: COMPLETED | OUTPATIENT
Start: 2020-09-09 | End: 2020-09-09

## 2020-09-09 RX ORDER — OXYCODONE HYDROCHLORIDE 5 MG/1
5 TABLET ORAL EVERY 6 HOURS
Refills: 0 | Status: DISCONTINUED | OUTPATIENT
Start: 2020-09-09 | End: 2020-09-11

## 2020-09-09 RX ORDER — CARVEDILOL PHOSPHATE 80 MG/1
25 CAPSULE, EXTENDED RELEASE ORAL EVERY 12 HOURS
Refills: 0 | Status: DISCONTINUED | OUTPATIENT
Start: 2020-09-09 | End: 2020-09-18

## 2020-09-09 RX ORDER — ASPIRIN/CALCIUM CARB/MAGNESIUM 324 MG
81 TABLET ORAL DAILY
Refills: 0 | Status: DISCONTINUED | OUTPATIENT
Start: 2020-09-09 | End: 2020-09-18

## 2020-09-09 RX ADMIN — Medication 100 MILLIGRAM(S): at 17:55

## 2020-09-09 RX ADMIN — CARVEDILOL PHOSPHATE 25 MILLIGRAM(S): 80 CAPSULE, EXTENDED RELEASE ORAL at 17:56

## 2020-09-09 RX ADMIN — HEPARIN SODIUM 5000 UNIT(S): 5000 INJECTION INTRAVENOUS; SUBCUTANEOUS at 10:15

## 2020-09-09 RX ADMIN — HEPARIN SODIUM 5000 UNIT(S): 5000 INJECTION INTRAVENOUS; SUBCUTANEOUS at 02:53

## 2020-09-09 RX ADMIN — Medication 1 MILLIGRAM(S): at 21:39

## 2020-09-09 RX ADMIN — Medication 81 MILLIGRAM(S): at 11:04

## 2020-09-09 RX ADMIN — HEPARIN SODIUM 5000 UNIT(S): 5000 INJECTION INTRAVENOUS; SUBCUTANEOUS at 17:56

## 2020-09-09 RX ADMIN — Medication 75 MICROGRAM(S): at 05:22

## 2020-09-09 RX ADMIN — HYDROMORPHONE HYDROCHLORIDE 0.5 MILLIGRAM(S): 2 INJECTION INTRAMUSCULAR; INTRAVENOUS; SUBCUTANEOUS at 05:31

## 2020-09-09 RX ADMIN — CARVEDILOL PHOSPHATE 25 MILLIGRAM(S): 80 CAPSULE, EXTENDED RELEASE ORAL at 06:51

## 2020-09-09 RX ADMIN — AMLODIPINE BESYLATE 10 MILLIGRAM(S): 2.5 TABLET ORAL at 10:15

## 2020-09-09 RX ADMIN — HYDROMORPHONE HYDROCHLORIDE 0.5 MILLIGRAM(S): 2 INJECTION INTRAMUSCULAR; INTRAVENOUS; SUBCUTANEOUS at 05:16

## 2020-09-09 RX ADMIN — PANTOPRAZOLE SODIUM 40 MILLIGRAM(S): 20 TABLET, DELAYED RELEASE ORAL at 11:04

## 2020-09-09 RX ADMIN — INSULIN GLARGINE 10 UNIT(S): 100 INJECTION, SOLUTION SUBCUTANEOUS at 07:11

## 2020-09-09 RX ADMIN — Medication 2: at 21:41

## 2020-09-09 NOTE — SPEAKING VALVE EVALUATION - OBSERVATIONS
Pooled blood-tinged secretions in the right buccal space requiring oral suctioning. Given finger occlusion of trach hub, Pt has brief but audible voice.

## 2020-09-09 NOTE — SPEAKING VALVE EVALUATION - ADDITIONAL COMMENTS
After PMV placement, Pt was asked to count 1-5. Voice is only audible for "1" suggesting reduced airway patency.

## 2020-09-09 NOTE — PROGRESS NOTE ADULT - ASSESSMENT
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 Nephrology consulted for dialysis arrangement.      # ESRD on HD TTS via LUE AVF   - s/p well tolerated HD 9/8 w UF 2.0 L  - net negative fluid balance 2.2 L/ 24hr   - acceptable electrolytes and volume status, no indication for HD at present  - plan for HD 9/10 as per regular schedule  - daily weights  - renal diet  - strict I/O     # HTN  - UF with HD    # Renal bone disease  - phos, calcium noted, acceptable   - no need for binders    # Anemia  - s/p 1u pRBC w HD 9/8, Hb 9.3 g/dl   - no DANIEL in setting of malignancy

## 2020-09-09 NOTE — PROGRESS NOTE ADULT - ATTENDING COMMENTS
CKD 5 progressed to ESRD with volume overload and uremia in March 2020, now dialyzing TIW via AVF  Tolerated 2L UF well on HD yesterday, accounting for 2uPRBCs received  Given active malignancy - please d/w his hematologist if giving an DANIEL for his anemia is advisable. Volume and lytes acceptable today

## 2020-09-09 NOTE — PROGRESS NOTE ADULT - SUBJECTIVE AND OBJECTIVE BOX
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 s/p L hemigloss, L ND, R ALT FF, NGT, trach 8LPC     Hospital Course:  9/2: weaned off pressors in SICU. soft pressures s/p bolus of albumin and PRBC 1 unit each. otherwise stable. no sedation and no pressors during am rounds. weaned to CPAP 30%. flap doing well with strong signal. s/p 300 rectal ASA. on abx  9/3: NAEON, flap doing well. pressures stable with MAPs >65. hgb 6.7, s/p 1u PRBC > r/p hgb 8. HD tomorrow per renal. PT unable to see today b/c anemia  9/4: NAEON. flap doing well. pressures stable with MAPs >65. HD today per renal. PT today  9/5: NAEON. had flap compromise yesterday late afternoon (no venous signal, flap dusky and no bleeding pinprick). s/p RTOR with flap debridement: left neck incision opened. arterial and venous anastomosis found to be intact with good flow on doppler.  ALT flap trimmed to bleeding tissue and anterior mouth packed with gauze.  left neck incision closed with interrupted nylon suture. overnight, hgb drop 6.9 s/p transfusion > hgb 7.5. venous and arterial signals good in the am.   9/6: NAEON. Bleeding with mild scratching on flap. Otherwise doing well. Pain controlled. Breathing well. Will stepdown today. Cuff deflated yesterday.  9/7: NAEON. Bleeding with mild scratching on flap.Pain controlled. Breathing well. In stepdown today.  9/8: NAEON. Bleeding on mild scratching. Pain controlled and breathing well. PEG today with IR.  9/9: NAEON. Transfused 1PRBC with dialysis yesterday. PEG placement yesterday. Bleeding on mild scratching with strong doppler. L PEBBLES appears frothy and possible fistula tract noted on L anterior floor of mouth. Packed with 1/4in packing for now.    PAST MEDICAL & SURGICAL HISTORY:  Hypothyroidism  Lower extremity edema  Hyperlipidemia  BPH (benign prostatic hyperplasia)  Type II diabetes mellitus  Essential hypertension  Chronic kidney disease (CKD), stage V: Hemodialysis  AV fistula: left UE  S/P appendectomy    No Known Allergies    MEDICATIONS  (STANDING):  amLODIPine   Tablet 10 milliGRAM(s) Oral daily  aspirin  chewable 81 milliGRAM(s) Oral daily  carvedilol 25 milliGRAM(s) Oral every 12 hours  ceFAZolin   IVPB 1000 milliGRAM(s) IV Intermittent every 24 hours  chlorhexidine 2% Cloths 1 Application(s) Topical daily  dextrose 5%. 1000 milliLiter(s) (50 mL/Hr) IV Continuous <Continuous>  dextrose 50% Injectable 12.5 Gram(s) IV Push once  dextrose 50% Injectable 25 Gram(s) IV Push once  dextrose 50% Injectable 25 Gram(s) IV Push once  heparin   Injectable 5000 Unit(s) SubCutaneous every 8 hours  insulin glargine Injectable (LANTUS) 10 Unit(s) SubCutaneous every morning  insulin lispro (HumaLOG) corrective regimen sliding scale   SubCutaneous Before meals and at bedtime  levothyroxine 75 MICROGram(s) Oral daily  pantoprazole  Injectable 40 milliGRAM(s) IV Push daily  tamsulosin 0.4 milliGRAM(s) Oral at bedtime    MEDICATIONS  (PRN):  acetaminophen    Suspension .. 650 milliGRAM(s) Oral every 6 hours PRN Temp greater or equal to 38C (100.4F)  dextrose 40% Gel 15 Gram(s) Oral once PRN Blood Glucose LESS THAN 70 milliGRAM(s)/deciliter  fentaNYL    Injectable 50 MICROGram(s) IV Push every 3 hours PRN For pain and aggitation  glucagon  Injectable 1 milliGRAM(s) IntraMuscular once PRN Glucose LESS THAN 70 milligrams/deciliter  HYDROmorphone  Injectable 0.5 milliGRAM(s) IV Push every 4 hours PRN Moderate Pain (4 - 6)  HYDROmorphone  Injectable 1 milliGRAM(s) IV Push every 4 hours PRN Severe Pain (7 - 10)      Objective    ICU Vital Signs Last 24 Hrs  T(C): 36.7 (09 Sep 2020 05:22), Max: 36.9 (08 Sep 2020 09:15)  T(F): 98 (09 Sep 2020 05:22), Max: 98.5 (08 Sep 2020 10:25)  HR: 86 (09 Sep 2020 08:41) (68 - 98)  BP: 139/65 (09 Sep 2020 08:41) (131/61 - 171/77)  BP(mean): 94 (09 Sep 2020 08:41) (88 - 110)  RR: 18 (09 Sep 2020 08:41) (17 - 20)  SpO2: 99% (09 Sep 2020 08:41) (95% - 100%)      General: AAOx3  Pulm: Nonlabored breathing, no respiratory distress. on TC 40%  Extrem: WWP, no edema. R thigh PEBBLES SS drainage. incision c/d/i on R thigh  HEENT: neck incision c/d/i, PEBBLES L neck SS, neck soft and flat. external flap doppler signal is strong for arterial. cook doppler for venous signal is wnl. intraoral flap has exposed muscle, which has BRB on gentle manipulation. sutures intact. gauze packing to anterior FOM                           9.3    7.51  )-----------( 269      ( 09 Sep 2020 06:58 )             27.7     09-09    136  |  94<L>  |  70<H>  ----------------------------<  126<H>  4.2   |  25  |  6.09<H>    Ca    9.3      09 Sep 2020 06:58  Phos  4.8     09-09  Mg     2.6     09-09    TPro  5.6<L>  /  Alb  2.5<L>  /  TBili  0.2  /  DBili  x   /  AST  18  /  ALT  <5<L>  /  AlkPhos  85  09-08      I&O's Summary    08 Sep 2020 07:01  -  09 Sep 2020 07:00  --------------------------------------------------------  IN: 1400 mL / OUT: 3630.5 mL / NET: -2230.5 mL                                  7.8    9.46  )-----------( 247      ( 08 Sep 2020 08:00 )             24.0     09-08    134<L>  |  93<L>  |  109<H>  ----------------------------<  165<H>  4.6   |  23  |  7.89<H>    Ca    9.5      08 Sep 2020 08:00  Phos  3.8     09-08  Mg     2.8     09-08    TPro  5.6<L>  /  Alb  2.5<L>  /  TBili  0.2  /  DBili  x   /  AST  18  /  ALT  <5<L>  /  AlkPhos  85  09-08      I&O's Summary    07 Sep 2020 07:01  -  08 Sep 2020 07:00  --------------------------------------------------------  IN: 1288 mL / OUT: 320 mL / NET: 968 mL        A/P: 64M w/ pmhx of ESRD, HTN, DM and T3N1M0 left tongue SCCa now s/p left hemiglossectomy, left modified radical neck dissection, right ALT free flap and tracheotomy. s/p L hemigloss, L ND, R ALT FF, NGT, trach 8LPC. s/p RTOR on 9/5 flap debridement of devascularized tissue  - SLP will see for PMV trial today  - Intermittently febrile last few days - no other signs of fever with downtrending WBC today  - q4 nursing flap checks - visual to see redness and color of flap, plus doppler/ signals  - HD on 9/10  - ASA 81/sqh  - PT: Attempt to walk patient today  - Avoid pressors, and maintain MAP 65+  - Regular trach suctioning and cleaning of inner cannula  - Monitor PEBBLES output  - Page ENT with questions/concerns

## 2020-09-09 NOTE — SPEAKING VALVE EVALUATION - SUBJECTIVE COMPLAINTS DURING VALVE TRIAL
Sats at 95-96% prior to PMV and decreases to % with PMV in place. Pt has no subjective complaints, says he cannot tell difference in breathing with PMV, but he is not able to sustain voice with PMV and appears to air trap

## 2020-09-09 NOTE — PROGRESS NOTE ADULT - SUBJECTIVE AND OBJECTIVE BOX
no acute events overnight  s/p well tolerated HD 9/8 w UF 2.0 L  net negative fluid balance 2.2 L/ 24hr       Meds:  acetaminophen    Suspension .. 650 every 6 hours PRN  aspirin  chewable 81 daily  carvedilol 25 every 12 hours  ceFAZolin   IVPB 1000 every 24 hours  chlorhexidine 2% Cloths 1 daily  dextrose 40% Gel 15 once PRN  dextrose 5%. 1000 <Continuous>  dextrose 50% Injectable 12.5 once  dextrose 50% Injectable 25 once  dextrose 50% Injectable 25 once  doxazosin 1 at bedtime  glucagon  Injectable 1 once PRN  heparin   Injectable 5000 every 8 hours  insulin glargine Injectable (LANTUS) 10 every morning  insulin lispro (HumaLOG) corrective regimen sliding scale  Before meals and at bedtime  levothyroxine 75 daily  oxyCODONE    Solution 5 every 6 hours PRN  oxyCODONE    Solution 10 every 6 hours PRN  pantoprazole  Injectable 40 daily      T(C): , Max: 37.2 (09-09-20 @ 10:00)  T(F): , Max: 98.9 (09-09-20 @ 10:00)  HR: 78 (09-09-20 @ 12:28)  BP: 154/67 (09-09-20 @ 12:22)  BP(mean): 97 (09-09-20 @ 12:22)  RR: 18 (09-09-20 @ 12:22)  SpO2: 97% (09-09-20 @ 12:28)  Wt(kg): --    09-08 @ 07:01  -  09-09 @ 07:00  --------------------------------------------------------  IN: 1400 mL / OUT: 3630.5 mL / NET: -2230.5 mL        PHYSICAL EXAM:  GENERAL: alert, no acute distress at present  NECK: No JVD, trach in place, FiO2 at 40%  CHEST/LUNG: equal breath sounds bilaterally  HEART: normal S1S2, RRR  ABDOMEN: Soft, Nontender, +BS, No flank tenderness  EXTREMITIES: No LE edema bilateral  Neurology: AAOx3, no focal neurological deficit  SKIN: No rashes  ACCESS: LUE AVF, good thrill and bruit appreciated      LABS:                        9.3    7.51  )-----------( 269      ( 09 Sep 2020 06:58 )             27.7     09-09    136  |  94<L>  |  70<H>  ----------------------------<  126<H>  4.2   |  25  |  6.09<H>    Ca    9.3      09 Sep 2020 06:58  Phos  4.8     09-09  Mg     2.6     09-09    TPro  5.6<L>  /  Alb  2.5<L>  /  TBili  0.2  /  DBili  x   /  AST  18  /  ALT  <5<L>  /  AlkPhos  85  09-08        RADIOLOGY & ADDITIONAL STUDIES:    reviewed

## 2020-09-10 LAB
ALBUMIN SERPL ELPH-MCNC: 2.9 G/DL — LOW (ref 3.3–5)
ALP SERPL-CCNC: 86 U/L — SIGNIFICANT CHANGE UP (ref 40–120)
ALT FLD-CCNC: <5 U/L — LOW (ref 10–45)
ANION GAP SERPL CALC-SCNC: 19 MMOL/L — HIGH (ref 5–17)
AST SERPL-CCNC: 20 U/L — SIGNIFICANT CHANGE UP (ref 10–40)
BILIRUB SERPL-MCNC: <0.2 MG/DL — SIGNIFICANT CHANGE UP (ref 0.2–1.2)
BUN SERPL-MCNC: 97 MG/DL — HIGH (ref 7–23)
CALCIUM SERPL-MCNC: 9.3 MG/DL — SIGNIFICANT CHANGE UP (ref 8.4–10.5)
CHLORIDE SERPL-SCNC: 91 MMOL/L — LOW (ref 96–108)
CO2 SERPL-SCNC: 24 MMOL/L — SIGNIFICANT CHANGE UP (ref 22–31)
CREAT SERPL-MCNC: 8.17 MG/DL — HIGH (ref 0.5–1.3)
GLUCOSE BLDC GLUCOMTR-MCNC: 121 MG/DL — HIGH (ref 70–99)
GLUCOSE BLDC GLUCOMTR-MCNC: 134 MG/DL — HIGH (ref 70–99)
GLUCOSE BLDC GLUCOMTR-MCNC: 158 MG/DL — HIGH (ref 70–99)
GLUCOSE BLDC GLUCOMTR-MCNC: 172 MG/DL — HIGH (ref 70–99)
GLUCOSE BLDC GLUCOMTR-MCNC: 200 MG/DL — HIGH (ref 70–99)
GLUCOSE BLDC GLUCOMTR-MCNC: 234 MG/DL — HIGH (ref 70–99)
GLUCOSE SERPL-MCNC: 163 MG/DL — HIGH (ref 70–99)
HCT VFR BLD CALC: 26.7 % — LOW (ref 39–50)
HGB BLD-MCNC: 9 G/DL — LOW (ref 13–17)
MCHC RBC-ENTMCNC: 28.8 PG — SIGNIFICANT CHANGE UP (ref 27–34)
MCHC RBC-ENTMCNC: 33.7 GM/DL — SIGNIFICANT CHANGE UP (ref 32–36)
MCV RBC AUTO: 85.3 FL — SIGNIFICANT CHANGE UP (ref 80–100)
NRBC # BLD: 0 /100 WBCS — SIGNIFICANT CHANGE UP (ref 0–0)
PHOSPHATE SERPL-MCNC: 5.9 MG/DL — HIGH (ref 2.5–4.5)
PLATELET # BLD AUTO: 320 K/UL — SIGNIFICANT CHANGE UP (ref 150–400)
POTASSIUM SERPL-MCNC: 4.2 MMOL/L — SIGNIFICANT CHANGE UP (ref 3.5–5.3)
POTASSIUM SERPL-SCNC: 4.2 MMOL/L — SIGNIFICANT CHANGE UP (ref 3.5–5.3)
PROT SERPL-MCNC: 5.7 G/DL — LOW (ref 6–8.3)
RBC # BLD: 3.13 M/UL — LOW (ref 4.2–5.8)
RBC # FLD: 14.6 % — HIGH (ref 10.3–14.5)
SODIUM SERPL-SCNC: 134 MMOL/L — LOW (ref 135–145)
WBC # BLD: 9.56 K/UL — SIGNIFICANT CHANGE UP (ref 3.8–10.5)
WBC # FLD AUTO: 9.56 K/UL — SIGNIFICANT CHANGE UP (ref 3.8–10.5)

## 2020-09-10 PROCEDURE — 90935 HEMODIALYSIS ONE EVALUATION: CPT

## 2020-09-10 RX ORDER — ALBUMIN HUMAN 25 %
50 VIAL (ML) INTRAVENOUS
Refills: 0 | Status: COMPLETED | OUTPATIENT
Start: 2020-09-10 | End: 2020-09-10

## 2020-09-10 RX ADMIN — CARVEDILOL PHOSPHATE 25 MILLIGRAM(S): 80 CAPSULE, EXTENDED RELEASE ORAL at 05:29

## 2020-09-10 RX ADMIN — HEPARIN SODIUM 5000 UNIT(S): 5000 INJECTION INTRAVENOUS; SUBCUTANEOUS at 10:13

## 2020-09-10 RX ADMIN — Medication 81 MILLIGRAM(S): at 13:28

## 2020-09-10 RX ADMIN — Medication 75 MICROGRAM(S): at 05:29

## 2020-09-10 RX ADMIN — HEPARIN SODIUM 5000 UNIT(S): 5000 INJECTION INTRAVENOUS; SUBCUTANEOUS at 01:03

## 2020-09-10 RX ADMIN — OXYCODONE HYDROCHLORIDE 10 MILLIGRAM(S): 5 TABLET ORAL at 22:58

## 2020-09-10 RX ADMIN — CARVEDILOL PHOSPHATE 25 MILLIGRAM(S): 80 CAPSULE, EXTENDED RELEASE ORAL at 17:49

## 2020-09-10 RX ADMIN — Medication 2: at 06:16

## 2020-09-10 RX ADMIN — INSULIN GLARGINE 10 UNIT(S): 100 INJECTION, SOLUTION SUBCUTANEOUS at 07:08

## 2020-09-10 RX ADMIN — PANTOPRAZOLE SODIUM 40 MILLIGRAM(S): 20 TABLET, DELAYED RELEASE ORAL at 13:28

## 2020-09-10 RX ADMIN — HEPARIN SODIUM 5000 UNIT(S): 5000 INJECTION INTRAVENOUS; SUBCUTANEOUS at 17:48

## 2020-09-10 RX ADMIN — Medication 100 MILLIGRAM(S): at 17:49

## 2020-09-10 RX ADMIN — Medication 1 MILLIGRAM(S): at 22:03

## 2020-09-10 RX ADMIN — Medication 4: at 16:30

## 2020-09-10 NOTE — PROGRESS NOTE ADULT - ASSESSMENT
63 yo M w/ pmhx of ESRD, HTN, DM and T3N1M0 left tongue SCCa now s/p left hemiglossectomy, left modified radical neck dissection, right ALT free flap and tracheotomy. Nephrology consulted for dialysis arrangement.      # ESRD on HD TTS via LUE AVF   - getting HD today as per regular schedule  - daily weights  - renal diet  - strict I/O     # HTN  - UF with HD    # Renal bone disease  - phos, calcium noted, acceptable   - no need for binders    # Anemia  - s/p hemotransfusions  - Hb 9.0 g/dl, stable  - no DANIEL in setting of malignancy

## 2020-09-10 NOTE — PROGRESS NOTE ADULT - SUBJECTIVE AND OBJECTIVE BOX
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 s/p L hemigloss, L ND, R ALT FF, NGT, trach 8LPC     Hospital Course:  9/2: weaned off pressors in SICU. soft pressures s/p bolus of albumin and PRBC 1 unit each. otherwise stable. no sedation and no pressors during am rounds. weaned to CPAP 30%. flap doing well with strong signal. s/p 300 rectal ASA. on abx  9/3: NAEON, flap doing well. pressures stable with MAPs >65. hgb 6.7, s/p 1u PRBC > r/p hgb 8. HD tomorrow per renal. PT unable to see today b/c anemia  9/4: NAEON. flap doing well. pressures stable with MAPs >65. HD today per renal. PT today  9/5: NAEON. had flap compromise yesterday late afternoon (no venous signal, flap dusky and no bleeding pinprick). s/p RTOR with flap debridement: left neck incision opened. arterial and venous anastomosis found to be intact with good flow on doppler.  ALT flap trimmed to bleeding tissue and anterior mouth packed with gauze.  left neck incision closed with interrupted nylon suture. overnight, hgb drop 6.9 s/p transfusion > hgb 7.5. venous and arterial signals good in the am.   9/6: NAEON. Bleeding with mild scratching on flap. Otherwise doing well. Pain controlled. Breathing well. Will stepdown today. Cuff deflated yesterday.  9/7: NAEON. Bleeding with mild scratching on flap.Pain controlled. Breathing well. In stepdown today.  9/8: NAEON. Bleeding on mild scratching. Pain controlled and breathing well. PEG today with IR.  9/9: NAEON. Transfused 1PRBC with dialysis yesterday. PEG placement yesterday. Bleeding on mild scratching with strong doppler. L PEBBLES appears frothy and possible fistula tract noted on L anterior floor of mouth. Packed with 1/4in packing for now.  9/10: NAEON. Leg PEBBLES removed yesterday. Post-transfusion CBC with adequate Hg increase. Pt started on continous feeds and tolerated well. Strong doppler at bedside. Mouth was repacked bedside. Breathing well on humidified air.    PAST MEDICAL & SURGICAL HISTORY:  Hypothyroidism  Lower extremity edema  Hyperlipidemia  BPH (benign prostatic hyperplasia)  Type II diabetes mellitus  Essential hypertension  Chronic kidney disease (CKD), stage V: Hemodialysis  AV fistula: left UE  S/P appendectomy    No Known Allergies    MEDICATIONS  (STANDING):  albumin human 25% IVPB 50 milliLiter(s) IV Intermittent every 1 hour  aspirin  chewable 81 milliGRAM(s) Oral daily  carvedilol 25 milliGRAM(s) Oral every 12 hours  ceFAZolin   IVPB 1000 milliGRAM(s) IV Intermittent every 24 hours  chlorhexidine 2% Cloths 1 Application(s) Topical daily  dextrose 5%. 1000 milliLiter(s) (50 mL/Hr) IV Continuous <Continuous>  dextrose 50% Injectable 12.5 Gram(s) IV Push once  dextrose 50% Injectable 25 Gram(s) IV Push once  dextrose 50% Injectable 25 Gram(s) IV Push once  doxazosin 1 milliGRAM(s) Oral at bedtime  heparin   Injectable 5000 Unit(s) SubCutaneous every 8 hours  insulin glargine Injectable (LANTUS) 10 Unit(s) SubCutaneous every morning  insulin lispro (HumaLOG) corrective regimen sliding scale   SubCutaneous Before meals and at bedtime  levothyroxine 75 MICROGram(s) Oral daily  pantoprazole  Injectable 40 milliGRAM(s) IV Push daily    MEDICATIONS  (PRN):  acetaminophen    Suspension .. 650 milliGRAM(s) Oral every 6 hours PRN Mild Pain (1 - 3)  dextrose 40% Gel 15 Gram(s) Oral once PRN Blood Glucose LESS THAN 70 milliGRAM(s)/deciliter  glucagon  Injectable 1 milliGRAM(s) IntraMuscular once PRN Glucose LESS THAN 70 milligrams/deciliter  oxyCODONE    Solution 5 milliGRAM(s) Oral every 6 hours PRN Moderate Pain (4 - 6)  oxyCODONE    Solution 10 milliGRAM(s) Oral every 6 hours PRN Severe Pain (7 - 10)      Objective    ICU Vital Signs Last 24 Hrs  T(C): 37.6 (10 Sep 2020 05:23), Max: 37.6 (10 Sep 2020 05:23)  T(F): 99.6 (10 Sep 2020 05:23), Max: 99.6 (10 Sep 2020 05:23)  HR: 88 (10 Sep 2020 04:00) (76 - 90)  BP: 157/77 (10 Sep 2020 04:00) (147/70 - 158/77)  BP(mean): 110 (10 Sep 2020 04:00) (97 - 110)  RR: 16 (10 Sep 2020 04:00) (16 - 18)  SpO2: 100% (10 Sep 2020 04:00) (93% - 100%)      General: AAOx3  Pulm: Nonlabored breathing, no respiratory distress. on TC 40%  Extrem: WWP, no edema.  incision c/d/i on R thigh  HEENT: neck incision c/d/i, PEBBLES L neck SS, neck soft and flat. external flap doppler signal is strong for arterial. cook doppler for venous signal is wnl. intraoral flap has exposed muscle, which has BRB on gentle manipulation. sutures intact. gauze packing to anterior FOM                             9.3    7.51  )-----------( 269      ( 09 Sep 2020 06:58 )             27.7     09-09    136  |  94<L>  |  70<H>  ----------------------------<  126<H>  4.2   |  25  |  6.09<H>    Ca    9.3      09 Sep 2020 06:58  Phos  4.8     09-09  Mg     2.6     09-09        I&O's Summary    09 Sep 2020 07:01  -  10 Sep 2020 07:00  --------------------------------------------------------  IN: 480 mL / OUT: 168 mL / NET: 312 mL                           A/P: 64M w/ pmhx of ESRD, HTN, DM and T3N1M0 left tongue SCCa now s/p left hemiglossectomy, left modified radical neck dissection, right ALT free flap and tracheotomy. s/p L hemigloss, L ND, R ALT FF, NGT, trach 8LPC. s/p RTOR on 9/5 flap debridement of devascularized tissue  - SLP: PMV not tolerated yesterday  - Beginning bolus feeds at 4PM  - Possible addition of Epogen per nephro  - q4 nursing flap checks - visual to see redness and color of flap, plus doppler/ signals  - HD on 9/10  - ASA 81/sqh  - PT: Attempt to walk patient today again (was able to walk with minimal help on walker)  - Avoid pressors, and maintain MAP 65+  - Regular trach suctioning and cleaning of inner cannula  - Monitor PEBBLES output  - Page ENT with questions/concerns

## 2020-09-10 NOTE — PROGRESS NOTE ADULT - SUBJECTIVE AND OBJECTIVE BOX
Patient was seen and evaluated on dialysis  No acute events overnight      Vitals:  HR: 86 (09-10-20 @ 09:25)  BP: 160/74 (09-10-20 @ 09:25)  Wt(kg): 71.6    PHYSICAL EXAM:  GENERAL: alert, no acute distress at present  NECK: No JVD, trach in place, FiO2 at 40%  CHEST/LUNG: equal breath sounds bilaterally  HEART: normal S1S2, RRR  ABDOMEN: Soft, Nontender, +BS, No flank tenderness  EXTREMITIES: No LE edema bilateral  Neurology: AAOx3, no focal neurological deficit  SKIN: No rashes  ACCESS: LUE AVF, good thrill and bruit appreciated      Labs:  09-10    134<L>  |  91<L>  |  97<H>  ----------------------------<  163<H>  4.2   |  24  |  8.17<H>    Ca    9.3      10 Sep 2020 10:03  Phos  5.9     09-10  Mg     2.6     09-09    TPro  5.7<L>  /  Alb  2.9<L>  /  TBili  <0.2  /  DBili  x   /  AST  20  /  ALT  <5<L>  /  AlkPhos  86  09-10    Continue dialysis:   Dialyzer:   180    QB: 400  K bath: 2  Goal UF:   2.5 - 3.0    L   over       210        min  Patient is tolerating the procedure well.   Continue full treatment as prescribed.

## 2020-09-11 LAB
GLUCOSE BLDC GLUCOMTR-MCNC: 135 MG/DL — HIGH (ref 70–99)
GLUCOSE BLDC GLUCOMTR-MCNC: 146 MG/DL — HIGH (ref 70–99)
GLUCOSE BLDC GLUCOMTR-MCNC: 185 MG/DL — HIGH (ref 70–99)
GLUCOSE BLDC GLUCOMTR-MCNC: 237 MG/DL — HIGH (ref 70–99)
GLUCOSE BLDC GLUCOMTR-MCNC: 93 MG/DL — SIGNIFICANT CHANGE UP (ref 70–99)

## 2020-09-11 PROCEDURE — 99232 SBSQ HOSP IP/OBS MODERATE 35: CPT

## 2020-09-11 RX ORDER — POLYETHYLENE GLYCOL 3350 17 G/17G
17 POWDER, FOR SOLUTION ORAL DAILY
Refills: 0 | Status: DISCONTINUED | OUTPATIENT
Start: 2020-09-11 | End: 2020-09-15

## 2020-09-11 RX ORDER — SENNA PLUS 8.6 MG/1
2 TABLET ORAL AT BEDTIME
Refills: 0 | Status: DISCONTINUED | OUTPATIENT
Start: 2020-09-11 | End: 2020-09-15

## 2020-09-11 RX ADMIN — INSULIN GLARGINE 10 UNIT(S): 100 INJECTION, SOLUTION SUBCUTANEOUS at 07:46

## 2020-09-11 RX ADMIN — HEPARIN SODIUM 5000 UNIT(S): 5000 INJECTION INTRAVENOUS; SUBCUTANEOUS at 01:10

## 2020-09-11 RX ADMIN — HEPARIN SODIUM 5000 UNIT(S): 5000 INJECTION INTRAVENOUS; SUBCUTANEOUS at 09:28

## 2020-09-11 RX ADMIN — OXYCODONE HYDROCHLORIDE 5 MILLIGRAM(S): 5 TABLET ORAL at 12:15

## 2020-09-11 RX ADMIN — CARVEDILOL PHOSPHATE 25 MILLIGRAM(S): 80 CAPSULE, EXTENDED RELEASE ORAL at 05:01

## 2020-09-11 RX ADMIN — Medication 75 MICROGRAM(S): at 05:01

## 2020-09-11 RX ADMIN — OXYCODONE HYDROCHLORIDE 10 MILLIGRAM(S): 5 TABLET ORAL at 02:55

## 2020-09-11 RX ADMIN — POLYETHYLENE GLYCOL 3350 17 GRAM(S): 17 POWDER, FOR SOLUTION ORAL at 21:23

## 2020-09-11 RX ADMIN — Medication 81 MILLIGRAM(S): at 12:15

## 2020-09-11 RX ADMIN — Medication 1 MILLIGRAM(S): at 21:23

## 2020-09-11 RX ADMIN — Medication 4: at 21:37

## 2020-09-11 RX ADMIN — Medication 100 MILLIGRAM(S): at 18:21

## 2020-09-11 RX ADMIN — OXYCODONE HYDROCHLORIDE 5 MILLIGRAM(S): 5 TABLET ORAL at 12:45

## 2020-09-11 RX ADMIN — CHLORHEXIDINE GLUCONATE 1 APPLICATION(S): 213 SOLUTION TOPICAL at 05:01

## 2020-09-11 RX ADMIN — SENNA PLUS 2 TABLET(S): 8.6 TABLET ORAL at 21:23

## 2020-09-11 RX ADMIN — CARVEDILOL PHOSPHATE 25 MILLIGRAM(S): 80 CAPSULE, EXTENDED RELEASE ORAL at 18:21

## 2020-09-11 RX ADMIN — OXYCODONE HYDROCHLORIDE 5 MILLIGRAM(S): 5 TABLET ORAL at 18:55

## 2020-09-11 RX ADMIN — PANTOPRAZOLE SODIUM 40 MILLIGRAM(S): 20 TABLET, DELAYED RELEASE ORAL at 12:15

## 2020-09-11 RX ADMIN — HEPARIN SODIUM 5000 UNIT(S): 5000 INJECTION INTRAVENOUS; SUBCUTANEOUS at 18:21

## 2020-09-11 NOTE — PROGRESS NOTE ADULT - SUBJECTIVE AND OBJECTIVE BOX
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 s/p L hemigloss, L ND, R ALT FF, NGT, trach 8LPC     Hospital Course:  9/2: weaned off pressors in SICU. soft pressures s/p bolus of albumin and PRBC 1 unit each. otherwise stable. no sedation and no pressors during am rounds. weaned to CPAP 30%. flap doing well with strong signal. s/p 300 rectal ASA. on abx  9/3: NAEON, flap doing well. pressures stable with MAPs >65. hgb 6.7, s/p 1u PRBC > r/p hgb 8. HD tomorrow per renal. PT unable to see today b/c anemia  9/4: NAEON. flap doing well. pressures stable with MAPs >65. HD today per renal. PT today  9/5: NAEON. had flap compromise yesterday late afternoon (no venous signal, flap dusky and no bleeding pinprick). s/p RTOR with flap debridement: left neck incision opened. arterial and venous anastomosis found to be intact with good flow on doppler.  ALT flap trimmed to bleeding tissue and anterior mouth packed with gauze.  left neck incision closed with interrupted nylon suture. overnight, hgb drop 6.9 s/p transfusion > hgb 7.5. venous and arterial signals good in the am.   9/6: NAEON. Bleeding with mild scratching on flap. Otherwise doing well. Pain controlled. Breathing well. Will stepdown today. Cuff deflated yesterday.  9/7: NAEON. Bleeding with mild scratching on flap.Pain controlled. Breathing well. In stepdown today.  9/8: NAEON. Bleeding on mild scratching. Pain controlled and breathing well. PEG today with IR.  9/9: NAEON. Transfused 1PRBC with dialysis yesterday. PEG placement yesterday. Bleeding on mild scratching with strong doppler. L PEBBLES appears frothy and possible fistula tract noted on L anterior floor of mouth. Packed with 1/4in packing for now.  9/10: NAEON. Leg PEBBLES removed yesterday. Post-transfusion CBC with adequate Hg increase. Pt started on continous feeds and tolerated well. Strong doppler at bedside. Mouth was repacked bedside. Breathing well on humidified air.  9/11: NAEON. Breathing well on humidified air with trach collar. Strong doppler bedside. Mouth was repacked. Pt started on bolusf eeds and tolerating well. Will change to 4CFS today.    PAST MEDICAL & SURGICAL HISTORY:  Hypothyroidism  Lower extremity edema  Hyperlipidemia  BPH (benign prostatic hyperplasia)  Type II diabetes mellitus  Essential hypertension  Chronic kidney disease (CKD), stage V: Hemodialysis  AV fistula: left UE  S/P appendectomy    No Known Allergies    MEDICATIONS  (STANDING):  albumin human 25% IVPB 50 milliLiter(s) IV Intermittent every 1 hour  aspirin  chewable 81 milliGRAM(s) Oral daily  carvedilol 25 milliGRAM(s) Oral every 12 hours  ceFAZolin   IVPB 1000 milliGRAM(s) IV Intermittent every 24 hours  chlorhexidine 2% Cloths 1 Application(s) Topical daily  dextrose 5%. 1000 milliLiter(s) (50 mL/Hr) IV Continuous <Continuous>  dextrose 50% Injectable 12.5 Gram(s) IV Push once  dextrose 50% Injectable 25 Gram(s) IV Push once  dextrose 50% Injectable 25 Gram(s) IV Push once  doxazosin 1 milliGRAM(s) Oral at bedtime  heparin   Injectable 5000 Unit(s) SubCutaneous every 8 hours  insulin glargine Injectable (LANTUS) 10 Unit(s) SubCutaneous every morning  insulin lispro (HumaLOG) corrective regimen sliding scale   SubCutaneous Before meals and at bedtime  levothyroxine 75 MICROGram(s) Oral daily  pantoprazole  Injectable 40 milliGRAM(s) IV Push daily    MEDICATIONS  (PRN):  acetaminophen    Suspension .. 650 milliGRAM(s) Oral every 6 hours PRN Mild Pain (1 - 3)  dextrose 40% Gel 15 Gram(s) Oral once PRN Blood Glucose LESS THAN 70 milliGRAM(s)/deciliter  glucagon  Injectable 1 milliGRAM(s) IntraMuscular once PRN Glucose LESS THAN 70 milligrams/deciliter  oxyCODONE    Solution 5 milliGRAM(s) Oral every 6 hours PRN Moderate Pain (4 - 6)  oxyCODONE    Solution 10 milliGRAM(s) Oral every 6 hours PRN Severe Pain (7 - 10)      Objective    ICU Vital Signs Last 24 Hrs  T(C): 36.8 (11 Sep 2020 09:07), Max: 37.4 (10 Sep 2020 17:08)  T(F): 98.2 (11 Sep 2020 09:07), Max: 99.3 (10 Sep 2020 17:08)  HR: 74 (11 Sep 2020 09:26) (74 - 97)  BP: 140/65 (11 Sep 2020 04:10) (126/58 - 166/75)  BP(mean): 93 (11 Sep 2020 04:10) (84 - 108)  RR: 16 (11 Sep 2020 09:26) (16 - 18)  SpO2: 100% (11 Sep 2020 09:26) (98% - 100%)      General: AAOx3  Pulm: Nonlabored breathing, no respiratory distress. on TC 40%  Extrem: WWP, no edema.  incision c/d/i on R thigh  HEENT: neck incision c/d/i, PEBBLES L neck SS, neck soft and flat. external flap doppler signal is strong for arterial. cook doppler for venous signal is wnl. intraoral flap has exposed muscle, which has BRB on gentle manipulation. sutures intact.                           9.0    9.56  )-----------( 320      ( 10 Sep 2020 10:03 )             26.7   09-10    134<L>  |  91<L>  |  97<H>  ----------------------------<  163<H>  4.2   |  24  |  8.17<H>    Ca    9.3      10 Sep 2020 10:03  Phos  5.9     09-10    TPro  5.7<L>  /  Alb  2.9<L>  /  TBili  <0.2  /  DBili  x   /  AST  20  /  ALT  <5<L>  /  AlkPhos  86  09-10                           A/P: 64M w/ pmhx of ESRD, HTN, DM and T3N1M0 left tongue SCCa now s/p left hemiglossectomy, left modified radical neck dissection, right ALT free flap and tracheotomy. s/p L hemigloss, L ND, R ALT FF, NGT, trach 8LPC. s/p RTOR on 9/5 flap debridement of devascularized tissue  - Will change to 4CFS trach today and cut venous doppler wire to skin  - On bolus feeds - tolerating well  - SLP: PMV not tolerated  - q4 nursing flap checks - visual to see redness and color of flap, plus doppler/ signals  - HD on 9/12  - ASA 81/sqh  - PT: Attempt to walk patient today again (was able to walk with minimal help on walker)  - Avoid pressors, and maintain MAP 65+  - Regular trach suctioning and cleaning of inner cannula  - Monitor PEBBLES output  - Page ENT with questions/concerns

## 2020-09-11 NOTE — PROGRESS NOTE ADULT - ASSESSMENT
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 Nephrology consulted for dialysis arrangement.      # ESRD on HD TTS via LUE AVF   - s/p HD 9/10, well tolerated UF 3.0 L   - acceptable volume and electrolytes  - plan for HD 9/12 as per regular schedule   - daily weights  - renal diet  - strict I/O     # HTN  - UF with HD    # Renal bone disease  - phos, calcium noted, acceptable   - no need for binders    # Anemia  - s/p hemotransfusions  - no DANIEL in setting of malignancy

## 2020-09-11 NOTE — PROGRESS NOTE ADULT - SUBJECTIVE AND OBJECTIVE BOX
no acute events overnight  s/p HD 9/10, well tolerated UF 3.0 L as prescribed  1u pRBC w HD 9/11  no active complaints at present      Meds:  acetaminophen    Suspension .. 650 every 6 hours PRN  aspirin  chewable 81 daily  carvedilol 25 every 12 hours  ceFAZolin   IVPB 1000 every 24 hours  chlorhexidine 2% Cloths 1 daily  dextrose 40% Gel 15 once PRN  dextrose 5%. 1000 <Continuous>  dextrose 50% Injectable 12.5 once  dextrose 50% Injectable 25 once  dextrose 50% Injectable 25 once  doxazosin 1 at bedtime  glucagon  Injectable 1 once PRN  heparin   Injectable 5000 every 8 hours  insulin glargine Injectable (LANTUS) 10 every morning  insulin lispro (HumaLOG) corrective regimen sliding scale  Before meals and at bedtime  levothyroxine 75 daily  oxyCODONE    Solution 5 every 6 hours PRN  oxyCODONE    Solution 10 every 6 hours PRN  pantoprazole  Injectable 40 daily      T(C): , Max: 37.4 (09-10-20 @ 17:08)  T(F): , Max: 99.3 (09-10-20 @ 17:08)  HR: 74 (09-11-20 @ 10:00)  BP: 160/70 (09-11-20 @ 10:00)  BP(mean): 100 (09-11-20 @ 10:00)  RR: 16 (09-11-20 @ 10:00)  SpO2: 100% (09-11-20 @ 10:00)  Wt(kg): --    09-10 @ 07:01  -  09-11 @ 07:00  --------------------------------------------------------  IN: 1104 mL / OUT: 3770 mL / NET: -2666 mL      PHYSICAL EXAM:  GENERAL: alert, no acute distress at present  NECK: No JVD, trach in place, FiO2 at 40%  CHEST/LUNG: equal breath sounds bilaterally  HEART: normal S1S2, RRR  ABDOMEN: Soft, Nontender, +BS, No flank tenderness  EXTREMITIES: No LE edema bilateral  Neurology: AAOx3, no focal neurological deficit  SKIN: No rashes  ACCESS: LUE AVF, good thrill and bruit appreciated      LABS:                        9.0    9.56  )-----------( 320      ( 10 Sep 2020 10:03 )             26.7     09-10    134<L>  |  91<L>  |  97<H>  ----------------------------<  163<H>  4.2   |  24  |  8.17<H>    Ca    9.3      10 Sep 2020 10:03  Phos  5.9     09-10    TPro  5.7<L>  /  Alb  2.9<L>  /  TBili  <0.2  /  DBili  x   /  AST  20  /  ALT  <5<L>  /  AlkPhos  86  09-10        RADIOLOGY & ADDITIONAL STUDIES:    reviewed

## 2020-09-11 NOTE — PROGRESS NOTE ADULT - ATTENDING COMMENTS
ESRD on HD TTS via LUE AVF admitted for hemiglossectomy 2/2 cancer, doing well. 3L negative over last 24hrs s/p HD yesterday.   BP uncontrolled - can restart his home amlodipine, suggest starting at 5mg daily instead of 10mg daily and if sbp still >140 consistently after 2-3 days can increase to 10mg daily +/- adding his home hydralazine

## 2020-09-12 LAB
ANION GAP SERPL CALC-SCNC: 17 MMOL/L — SIGNIFICANT CHANGE UP (ref 5–17)
BASOPHILS # BLD AUTO: 0.03 K/UL — SIGNIFICANT CHANGE UP (ref 0–0.2)
BASOPHILS NFR BLD AUTO: 0.3 % — SIGNIFICANT CHANGE UP (ref 0–2)
BUN SERPL-MCNC: 89 MG/DL — HIGH (ref 7–23)
CALCIUM SERPL-MCNC: 9.2 MG/DL — SIGNIFICANT CHANGE UP (ref 8.4–10.5)
CHLORIDE SERPL-SCNC: 88 MMOL/L — LOW (ref 96–108)
CO2 SERPL-SCNC: 27 MMOL/L — SIGNIFICANT CHANGE UP (ref 22–31)
CREAT SERPL-MCNC: 7.41 MG/DL — HIGH (ref 0.5–1.3)
CULTURE RESULTS: SIGNIFICANT CHANGE UP
CULTURE RESULTS: SIGNIFICANT CHANGE UP
EOSINOPHIL # BLD AUTO: 0.22 K/UL — SIGNIFICANT CHANGE UP (ref 0–0.5)
EOSINOPHIL NFR BLD AUTO: 2 % — SIGNIFICANT CHANGE UP (ref 0–6)
GLUCOSE BLDC GLUCOMTR-MCNC: 152 MG/DL — HIGH (ref 70–99)
GLUCOSE BLDC GLUCOMTR-MCNC: 253 MG/DL — HIGH (ref 70–99)
GLUCOSE BLDC GLUCOMTR-MCNC: 268 MG/DL — HIGH (ref 70–99)
GLUCOSE BLDC GLUCOMTR-MCNC: 73 MG/DL — SIGNIFICANT CHANGE UP (ref 70–99)
GLUCOSE SERPL-MCNC: 155 MG/DL — HIGH (ref 70–99)
HCT VFR BLD CALC: 29.2 % — LOW (ref 39–50)
HGB BLD-MCNC: 9.7 G/DL — LOW (ref 13–17)
IMM GRANULOCYTES NFR BLD AUTO: 1 % — SIGNIFICANT CHANGE UP (ref 0–1.5)
LYMPHOCYTES # BLD AUTO: 0.64 K/UL — LOW (ref 1–3.3)
LYMPHOCYTES # BLD AUTO: 5.7 % — LOW (ref 13–44)
MAGNESIUM SERPL-MCNC: 2.6 MG/DL — SIGNIFICANT CHANGE UP (ref 1.6–2.6)
MCHC RBC-ENTMCNC: 28.9 PG — SIGNIFICANT CHANGE UP (ref 27–34)
MCHC RBC-ENTMCNC: 33.2 GM/DL — SIGNIFICANT CHANGE UP (ref 32–36)
MCV RBC AUTO: 86.9 FL — SIGNIFICANT CHANGE UP (ref 80–100)
MONOCYTES # BLD AUTO: 0.94 K/UL — HIGH (ref 0–0.9)
MONOCYTES NFR BLD AUTO: 8.4 % — SIGNIFICANT CHANGE UP (ref 2–14)
NEUTROPHILS # BLD AUTO: 9.25 K/UL — HIGH (ref 1.8–7.4)
NEUTROPHILS NFR BLD AUTO: 82.6 % — HIGH (ref 43–77)
NRBC # BLD: 0 /100 WBCS — SIGNIFICANT CHANGE UP (ref 0–0)
PHOSPHATE SERPL-MCNC: 5.1 MG/DL — HIGH (ref 2.5–4.5)
PLATELET # BLD AUTO: 347 K/UL — SIGNIFICANT CHANGE UP (ref 150–400)
POTASSIUM SERPL-MCNC: 4.5 MMOL/L — SIGNIFICANT CHANGE UP (ref 3.5–5.3)
POTASSIUM SERPL-SCNC: 4.5 MMOL/L — SIGNIFICANT CHANGE UP (ref 3.5–5.3)
RBC # BLD: 3.36 M/UL — LOW (ref 4.2–5.8)
RBC # FLD: 14.2 % — SIGNIFICANT CHANGE UP (ref 10.3–14.5)
SODIUM SERPL-SCNC: 132 MMOL/L — LOW (ref 135–145)
SPECIMEN SOURCE: SIGNIFICANT CHANGE UP
SPECIMEN SOURCE: SIGNIFICANT CHANGE UP
WBC # BLD: 11.19 K/UL — HIGH (ref 3.8–10.5)
WBC # FLD AUTO: 11.19 K/UL — HIGH (ref 3.8–10.5)

## 2020-09-12 PROCEDURE — 90935 HEMODIALYSIS ONE EVALUATION: CPT

## 2020-09-12 RX ADMIN — Medication 100 MILLIGRAM(S): at 18:54

## 2020-09-12 RX ADMIN — HEPARIN SODIUM 5000 UNIT(S): 5000 INJECTION INTRAVENOUS; SUBCUTANEOUS at 10:14

## 2020-09-12 RX ADMIN — HEPARIN SODIUM 5000 UNIT(S): 5000 INJECTION INTRAVENOUS; SUBCUTANEOUS at 01:43

## 2020-09-12 RX ADMIN — Medication 75 MICROGRAM(S): at 06:04

## 2020-09-12 RX ADMIN — CHLORHEXIDINE GLUCONATE 1 APPLICATION(S): 213 SOLUTION TOPICAL at 06:04

## 2020-09-12 RX ADMIN — HEPARIN SODIUM 5000 UNIT(S): 5000 INJECTION INTRAVENOUS; SUBCUTANEOUS at 18:55

## 2020-09-12 RX ADMIN — Medication 2: at 06:04

## 2020-09-12 RX ADMIN — CARVEDILOL PHOSPHATE 25 MILLIGRAM(S): 80 CAPSULE, EXTENDED RELEASE ORAL at 18:54

## 2020-09-12 RX ADMIN — Medication 81 MILLIGRAM(S): at 13:37

## 2020-09-12 RX ADMIN — Medication 6: at 21:35

## 2020-09-12 RX ADMIN — Medication 650 MILLIGRAM(S): at 22:30

## 2020-09-12 RX ADMIN — OXYCODONE HYDROCHLORIDE 10 MILLIGRAM(S): 5 TABLET ORAL at 19:31

## 2020-09-12 RX ADMIN — INSULIN GLARGINE 10 UNIT(S): 100 INJECTION, SOLUTION SUBCUTANEOUS at 07:47

## 2020-09-12 RX ADMIN — PANTOPRAZOLE SODIUM 40 MILLIGRAM(S): 20 TABLET, DELAYED RELEASE ORAL at 13:38

## 2020-09-12 RX ADMIN — CARVEDILOL PHOSPHATE 25 MILLIGRAM(S): 80 CAPSULE, EXTENDED RELEASE ORAL at 06:00

## 2020-09-12 RX ADMIN — Medication 650 MILLIGRAM(S): at 21:35

## 2020-09-12 RX ADMIN — Medication 6: at 12:00

## 2020-09-12 RX ADMIN — OXYCODONE HYDROCHLORIDE 10 MILLIGRAM(S): 5 TABLET ORAL at 19:57

## 2020-09-12 RX ADMIN — Medication 1 MILLIGRAM(S): at 21:36

## 2020-09-12 RX ADMIN — POLYETHYLENE GLYCOL 3350 17 GRAM(S): 17 POWDER, FOR SOLUTION ORAL at 13:37

## 2020-09-12 RX ADMIN — SENNA PLUS 2 TABLET(S): 8.6 TABLET ORAL at 21:36

## 2020-09-12 NOTE — PROGRESS NOTE ADULT - ATTENDING COMMENTS
seen on HD with Dr Peacock , tolerating rx  cont rx x 3.5 hours for clearance and UF  aim for UF 2-2.5 L if tolerates seen on HD with Dr Peacock , tolerating rx, VSS , NAD on trach  cont rx x 3.5 hours for clearance and UF  aim for UF 2-3 L as tolerates

## 2020-09-12 NOTE — PROGRESS NOTE ADULT - SUBJECTIVE AND OBJECTIVE BOX
Patient is a 64y Male seen and evaluated at bedside. BP acceptable. For hemodialysis today. NAD.       Meds:    acetaminophen    Suspension .. 650 every 6 hours PRN  aspirin  chewable 81 daily  carvedilol 25 every 12 hours  ceFAZolin   IVPB 1000 every 24 hours  chlorhexidine 2% Cloths 1 daily  dextrose 40% Gel 15 once PRN  dextrose 5%. 1000 <Continuous>  dextrose 50% Injectable 12.5 once  dextrose 50% Injectable 25 once  dextrose 50% Injectable 25 once  doxazosin 1 at bedtime  glucagon  Injectable 1 once PRN  heparin   Injectable 5000 every 8 hours  insulin glargine Injectable (LANTUS) 10 every morning  insulin lispro (HumaLOG) corrective regimen sliding scale  Before meals and at bedtime  levothyroxine 75 daily  oxyCODONE    Solution 5 every 6 hours PRN  oxyCODONE    Solution 10 every 6 hours PRN  pantoprazole  Injectable 40 daily  polyethylene glycol 3350 17 daily  senna 2 at bedtime      T(C): , Max: 37.3 (09-12-20 @ 12:25)  T(F): , Max: 99.1 (09-12-20 @ 12:25)  HR: 89 (09-12-20 @ 12:25)  BP: 161/69 (09-12-20 @ 12:25)  BP(mean): 99 (09-12-20 @ 12:25)  RR: 18 (09-12-20 @ 12:25)  SpO2: 100% (09-12-20 @ 12:25)  Wt(kg): --    09-11 @ 07:01  -  09-12 @ 07:00  --------------------------------------------------------  IN: 474 mL / OUT: 110 mL / NET: 364 mL          Review of Systems:  unable to obtain       PHYSICAL EXAM:  GENERAL: alert, no acute distress at present  NECK: No JVD, trach in place, FiO2 at 40%  CHEST/LUNG: equal breath sounds bilaterally  HEART: normal S1S2, RRR  EXTREMITIES: No LE edema bilateral  ACCESS: LUE AVF, good thrill and bruit appreciated      LABS:                        9.7    11.19 )-----------( 347      ( 12 Sep 2020 06:27 )             29.2     09-12    132<L>  |  88<L>  |  89<H>  ----------------------------<  155<H>  4.5   |  27  |  7.41<H>    Ca    9.2      12 Sep 2020 06:27  Phos  5.1     09-12  Mg     2.6     09-12                  RADIOLOGY & ADDITIONAL STUDIES:

## 2020-09-12 NOTE — PROGRESS NOTE ADULT - SUBJECTIVE AND OBJECTIVE BOX
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 s/p L hemigloss, L ND, R ALT FF, NGT, trach 8LPC     Hospital Course:  9/2: weaned off pressors in SICU. soft pressures s/p bolus of albumin and PRBC 1 unit each. otherwise stable. no sedation and no pressors during am rounds. weaned to CPAP 30%. flap doing well with strong signal. s/p 300 rectal ASA. on abx  9/3: NAEON, flap doing well. pressures stable with MAPs >65. hgb 6.7, s/p 1u PRBC > r/p hgb 8. HD tomorrow per renal. PT unable to see today b/c anemia  9/4: NAEON. flap doing well. pressures stable with MAPs >65. HD today per renal. PT today  9/5: NAEON. had flap compromise yesterday late afternoon (no venous signal, flap dusky and no bleeding pinprick). s/p RTOR with flap debridement: left neck incision opened. arterial and venous anastomosis found to be intact with good flow on doppler.  ALT flap trimmed to bleeding tissue and anterior mouth packed with gauze.  left neck incision closed with interrupted nylon suture. overnight, hgb drop 6.9 s/p transfusion > hgb 7.5. venous and arterial signals good in the am.   9/6: NAEON. Bleeding with mild scratching on flap. Otherwise doing well. Pain controlled. Breathing well. Will stepdown today. Cuff deflated yesterday.  9/7: NAEON. Bleeding with mild scratching on flap.Pain controlled. Breathing well. In stepdown today.  9/8: NAEON. Bleeding on mild scratching. Pain controlled and breathing well. PEG today with IR.  9/9: NAEON. Transfused 1PRBC with dialysis yesterday. PEG placement yesterday. Bleeding on mild scratching with strong doppler. L PEBBLES appears frothy and possible fistula tract noted on L anterior floor of mouth. Packed with 1/4in packing for now.  9/10: NAEON. Leg PEBBLES removed yesterday. Post-transfusion CBC with adequate Hg increase. Pt started on continous feeds and tolerated well. Strong doppler at bedside. Mouth was repacked bedside. Breathing well on humidified air.  9/11: NAEON. Breathing well on humidified air with trach collar. Strong doppler bedside. Mouth was repacked. Pt started on bolusf eeds and tolerating well. Will change to 4CFS today.  9/12: NAEON. Breathing well. Packing replaced.         General: AAOx3  HEENT: neck incision c/d/i, PEBBLES L neck SS, neck soft and flat. external flap doppler signal is strong for arterial. intraoral flap has exposed muscle. packing replaced FOM, debris from tongue and flap cleaned  Neck: 4CFS in place, on TC 40%, no fluctuance of neck palpable  Extrem: WWP, no edema.  incision c/d/i on R thigh  Pulm: Nonlabored breathing, no respiratory distress.                                A/P: 64M w/ pmhx of ESRD, HTN, DM and T3N1M0 left tongue SCCa now s/p left hemiglossectomy, left modified radical neck dissection, right ALT free flap and tracheotomy. s/p L hemigloss, L ND, R ALT FF, NGT, trach 8LPC. s/p RTOR on 9/5 flap debridement of devascularized tissue  -WBC 9.6 --> 11, no clinical signs of infection. afebrile  -4CFS in place  - On bolus feeds - tolerating well  - SLP: PMV not tolerated, will repeat today   - qshift nursing flap checks - visual to see redness and color of flap, plus doppler/ signals  - HD on 9/12  - ASA 81/sqh  - Walk pt daily   - Avoid pressors, and maintain MAP 65+  - Regular trach suctioning and cleaning of inner cannula  - Monitor PEBBLES output  - Page ENT with questions/concerns

## 2020-09-13 LAB
GLUCOSE BLDC GLUCOMTR-MCNC: 156 MG/DL — HIGH (ref 70–99)
GLUCOSE BLDC GLUCOMTR-MCNC: 250 MG/DL — HIGH (ref 70–99)
GLUCOSE BLDC GLUCOMTR-MCNC: 300 MG/DL — HIGH (ref 70–99)
GLUCOSE BLDC GLUCOMTR-MCNC: 324 MG/DL — HIGH (ref 70–99)

## 2020-09-13 RX ORDER — AMPICILLIN SODIUM AND SULBACTAM SODIUM 250; 125 MG/ML; MG/ML
3 INJECTION, POWDER, FOR SUSPENSION INTRAMUSCULAR; INTRAVENOUS EVERY 24 HOURS
Refills: 0 | Status: DISCONTINUED | OUTPATIENT
Start: 2020-09-13 | End: 2020-09-16

## 2020-09-13 RX ADMIN — CARVEDILOL PHOSPHATE 25 MILLIGRAM(S): 80 CAPSULE, EXTENDED RELEASE ORAL at 17:40

## 2020-09-13 RX ADMIN — Medication 75 MICROGRAM(S): at 05:58

## 2020-09-13 RX ADMIN — HEPARIN SODIUM 5000 UNIT(S): 5000 INJECTION INTRAVENOUS; SUBCUTANEOUS at 17:40

## 2020-09-13 RX ADMIN — POLYETHYLENE GLYCOL 3350 17 GRAM(S): 17 POWDER, FOR SOLUTION ORAL at 12:00

## 2020-09-13 RX ADMIN — PANTOPRAZOLE SODIUM 40 MILLIGRAM(S): 20 TABLET, DELAYED RELEASE ORAL at 12:00

## 2020-09-13 RX ADMIN — Medication 81 MILLIGRAM(S): at 12:00

## 2020-09-13 RX ADMIN — Medication 650 MILLIGRAM(S): at 22:45

## 2020-09-13 RX ADMIN — Medication 1 MILLIGRAM(S): at 21:07

## 2020-09-13 RX ADMIN — Medication 2: at 16:36

## 2020-09-13 RX ADMIN — Medication 4: at 07:23

## 2020-09-13 RX ADMIN — CHLORHEXIDINE GLUCONATE 1 APPLICATION(S): 213 SOLUTION TOPICAL at 05:58

## 2020-09-13 RX ADMIN — INSULIN GLARGINE 10 UNIT(S): 100 INJECTION, SOLUTION SUBCUTANEOUS at 07:24

## 2020-09-13 RX ADMIN — Medication 6: at 12:00

## 2020-09-13 RX ADMIN — HEPARIN SODIUM 5000 UNIT(S): 5000 INJECTION INTRAVENOUS; SUBCUTANEOUS at 05:57

## 2020-09-13 RX ADMIN — Medication 650 MILLIGRAM(S): at 21:45

## 2020-09-13 RX ADMIN — CARVEDILOL PHOSPHATE 25 MILLIGRAM(S): 80 CAPSULE, EXTENDED RELEASE ORAL at 05:59

## 2020-09-13 RX ADMIN — HEPARIN SODIUM 5000 UNIT(S): 5000 INJECTION INTRAVENOUS; SUBCUTANEOUS at 09:37

## 2020-09-13 RX ADMIN — Medication 8: at 22:13

## 2020-09-13 RX ADMIN — AMPICILLIN SODIUM AND SULBACTAM SODIUM 200 GRAM(S): 250; 125 INJECTION, POWDER, FOR SUSPENSION INTRAMUSCULAR; INTRAVENOUS at 15:14

## 2020-09-13 NOTE — PROGRESS NOTE ADULT - SUBJECTIVE AND OBJECTIVE BOX
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 s/p L hemigloss, L ND, R ALT FF, NGT, trach 8LPC     Hospital Course:  9/2: weaned off pressors in SICU. soft pressures s/p bolus of albumin and PRBC 1 unit each. otherwise stable. no sedation and no pressors during am rounds. weaned to CPAP 30%. flap doing well with strong signal. s/p 300 rectal ASA. on abx  9/3: NAEON, flap doing well. pressures stable with MAPs >65. hgb 6.7, s/p 1u PRBC > r/p hgb 8. HD tomorrow per renal. PT unable to see today b/c anemia  9/4: NAEON. flap doing well. pressures stable with MAPs >65. HD today per renal. PT today  9/5: NAEON. had flap compromise yesterday late afternoon (no venous signal, flap dusky and no bleeding pinprick). s/p RTOR with flap debridement: left neck incision opened. arterial and venous anastomosis found to be intact with good flow on doppler.  ALT flap trimmed to bleeding tissue and anterior mouth packed with gauze.  left neck incision closed with interrupted nylon suture. overnight, hgb drop 6.9 s/p transfusion > hgb 7.5. venous and arterial signals good in the am.   9/6: NAEON. Bleeding with mild scratching on flap. Otherwise doing well. Pain controlled. Breathing well. Will stepdown today. Cuff deflated yesterday.  9/7: NAEON. Bleeding with mild scratching on flap.Pain controlled. Breathing well. In stepdown today.  9/8: NAEON. Bleeding on mild scratching. Pain controlled and breathing well. PEG today with IR.  9/9: NAEON. Transfused 1PRBC with dialysis yesterday. PEG placement yesterday. Bleeding on mild scratching with strong doppler. L PEBBLES appears frothy and possible fistula tract noted on L anterior floor of mouth. Packed with 1/4in packing for now.  9/10: NAEON. Leg PEBBLES removed yesterday. Post-transfusion CBC with adequate Hg increase. Pt started on continous feeds and tolerated well. Strong doppler at bedside. Mouth was repacked bedside. Breathing well on humidified air.  9/11: NAEON. Breathing well on humidified air with trach collar. Strong doppler bedside. Mouth was repacked. Pt started on bolusf eeds and tolerating well. Will change to 4CFS today.  9/12: NAEON. Breathing well. Packing replaced.   9/13:NAEON. Tmax 100.6, Breathing well. Packing replaced.       General: AAOx3  HEENT: neck incision c/d/i, PEBBLES L neck SS, neck soft and flat. external flap doppler signal is strong for arterial. intraoral flap has exposed muscle. packing replaced FOM, debris from tongue and flap cleaned  Neck: 4CFS in place, on TC 40%, minimal fullness of neck  Extrem: WWP, no edema.  incision c/d/i on R thigh  Pulm: Nonlabored breathing, no respiratory distress.                                A/P: 64M w/ pmhx of ESRD, HTN, DM and T3N1M0 left tongue SCCa now s/p left hemiglossectomy, left modified radical neck dissection, right ALT free flap and tracheotomy. s/p L hemigloss, L ND, R ALT FF, NGT, trach 8LPC. s/p RTOR on 9/5 flap debridement of devascularized tissue  -WBC 9.6 --> 11, no clinical signs of infection. afebrile. Will f/u labs tmrw  -4CFS in place  - On bolus feeds - tolerating well  - SLP: PMV not tolerated, will repeat today   - qshift nursing flap checks - visual to see redness and color of flap, plus doppler/ signals  - HD on 9/14  - ASA 81/sqh  - Walk pt daily   - Avoid pressors, and maintain MAP 65+  - Regular trach suctioning and cleaning of inner cannula  - Monitor PEBBLES output  - Page ENT with questions/concerns

## 2020-09-14 LAB
ALBUMIN SERPL ELPH-MCNC: 3.1 G/DL — LOW (ref 3.3–5)
ALP SERPL-CCNC: 139 U/L — HIGH (ref 40–120)
ALT FLD-CCNC: <5 U/L — LOW (ref 10–45)
ANION GAP SERPL CALC-SCNC: 18 MMOL/L — HIGH (ref 5–17)
AST SERPL-CCNC: 17 U/L — SIGNIFICANT CHANGE UP (ref 10–40)
BASOPHILS # BLD AUTO: 0.05 K/UL — SIGNIFICANT CHANGE UP (ref 0–0.2)
BASOPHILS NFR BLD AUTO: 0.4 % — SIGNIFICANT CHANGE UP (ref 0–2)
BILIRUB SERPL-MCNC: 0.2 MG/DL — SIGNIFICANT CHANGE UP (ref 0.2–1.2)
BUN SERPL-MCNC: 95 MG/DL — HIGH (ref 7–23)
CALCIUM SERPL-MCNC: 9.3 MG/DL — SIGNIFICANT CHANGE UP (ref 8.4–10.5)
CHLORIDE SERPL-SCNC: 88 MMOL/L — LOW (ref 96–108)
CO2 SERPL-SCNC: 27 MMOL/L — SIGNIFICANT CHANGE UP (ref 22–31)
CREAT SERPL-MCNC: 7.51 MG/DL — HIGH (ref 0.5–1.3)
EOSINOPHIL # BLD AUTO: 0.19 K/UL — SIGNIFICANT CHANGE UP (ref 0–0.5)
EOSINOPHIL NFR BLD AUTO: 1.3 % — SIGNIFICANT CHANGE UP (ref 0–6)
GLUCOSE BLDC GLUCOMTR-MCNC: 102 MG/DL — HIGH (ref 70–99)
GLUCOSE BLDC GLUCOMTR-MCNC: 219 MG/DL — HIGH (ref 70–99)
GLUCOSE BLDC GLUCOMTR-MCNC: 236 MG/DL — HIGH (ref 70–99)
GLUCOSE BLDC GLUCOMTR-MCNC: 337 MG/DL — HIGH (ref 70–99)
GLUCOSE BLDC GLUCOMTR-MCNC: 356 MG/DL — HIGH (ref 70–99)
GLUCOSE SERPL-MCNC: 240 MG/DL — HIGH (ref 70–99)
HCT VFR BLD CALC: 28.7 % — LOW (ref 39–50)
HGB BLD-MCNC: 9.3 G/DL — LOW (ref 13–17)
IMM GRANULOCYTES NFR BLD AUTO: 2 % — HIGH (ref 0–1.5)
LYMPHOCYTES # BLD AUTO: 0.81 K/UL — LOW (ref 1–3.3)
LYMPHOCYTES # BLD AUTO: 5.7 % — LOW (ref 13–44)
MCHC RBC-ENTMCNC: 28.9 PG — SIGNIFICANT CHANGE UP (ref 27–34)
MCHC RBC-ENTMCNC: 32.4 GM/DL — SIGNIFICANT CHANGE UP (ref 32–36)
MCV RBC AUTO: 89.1 FL — SIGNIFICANT CHANGE UP (ref 80–100)
MONOCYTES # BLD AUTO: 1.24 K/UL — HIGH (ref 0–0.9)
MONOCYTES NFR BLD AUTO: 8.7 % — SIGNIFICANT CHANGE UP (ref 2–14)
NEUTROPHILS # BLD AUTO: 11.63 K/UL — HIGH (ref 1.8–7.4)
NEUTROPHILS NFR BLD AUTO: 81.9 % — HIGH (ref 43–77)
NRBC # BLD: 0 /100 WBCS — SIGNIFICANT CHANGE UP (ref 0–0)
PLATELET # BLD AUTO: 392 K/UL — SIGNIFICANT CHANGE UP (ref 150–400)
POTASSIUM SERPL-MCNC: 4.3 MMOL/L — SIGNIFICANT CHANGE UP (ref 3.5–5.3)
POTASSIUM SERPL-SCNC: 4.3 MMOL/L — SIGNIFICANT CHANGE UP (ref 3.5–5.3)
PROT SERPL-MCNC: 6.5 G/DL — SIGNIFICANT CHANGE UP (ref 6–8.3)
RBC # BLD: 3.22 M/UL — LOW (ref 4.2–5.8)
RBC # FLD: 14.5 % — SIGNIFICANT CHANGE UP (ref 10.3–14.5)
SODIUM SERPL-SCNC: 133 MMOL/L — LOW (ref 135–145)
WBC # BLD: 14.2 K/UL — HIGH (ref 3.8–10.5)
WBC # FLD AUTO: 14.2 K/UL — HIGH (ref 3.8–10.5)

## 2020-09-14 PROCEDURE — 99232 SBSQ HOSP IP/OBS MODERATE 35: CPT

## 2020-09-14 RX ADMIN — HEPARIN SODIUM 5000 UNIT(S): 5000 INJECTION INTRAVENOUS; SUBCUTANEOUS at 09:44

## 2020-09-14 RX ADMIN — CARVEDILOL PHOSPHATE 25 MILLIGRAM(S): 80 CAPSULE, EXTENDED RELEASE ORAL at 17:05

## 2020-09-14 RX ADMIN — Medication 4: at 22:26

## 2020-09-14 RX ADMIN — INSULIN GLARGINE 10 UNIT(S): 100 INJECTION, SOLUTION SUBCUTANEOUS at 07:02

## 2020-09-14 RX ADMIN — Medication 1 MILLIGRAM(S): at 21:55

## 2020-09-14 RX ADMIN — HEPARIN SODIUM 5000 UNIT(S): 5000 INJECTION INTRAVENOUS; SUBCUTANEOUS at 02:06

## 2020-09-14 RX ADMIN — POLYETHYLENE GLYCOL 3350 17 GRAM(S): 17 POWDER, FOR SOLUTION ORAL at 09:43

## 2020-09-14 RX ADMIN — OXYCODONE HYDROCHLORIDE 5 MILLIGRAM(S): 5 TABLET ORAL at 09:44

## 2020-09-14 RX ADMIN — HEPARIN SODIUM 5000 UNIT(S): 5000 INJECTION INTRAVENOUS; SUBCUTANEOUS at 17:04

## 2020-09-14 RX ADMIN — CARVEDILOL PHOSPHATE 25 MILLIGRAM(S): 80 CAPSULE, EXTENDED RELEASE ORAL at 05:02

## 2020-09-14 RX ADMIN — Medication 4: at 06:24

## 2020-09-14 RX ADMIN — AMPICILLIN SODIUM AND SULBACTAM SODIUM 200 GRAM(S): 250; 125 INJECTION, POWDER, FOR SUSPENSION INTRAMUSCULAR; INTRAVENOUS at 14:17

## 2020-09-14 RX ADMIN — Medication 75 MICROGRAM(S): at 05:02

## 2020-09-14 RX ADMIN — Medication 81 MILLIGRAM(S): at 09:44

## 2020-09-14 RX ADMIN — Medication 10: at 12:01

## 2020-09-14 RX ADMIN — PANTOPRAZOLE SODIUM 40 MILLIGRAM(S): 20 TABLET, DELAYED RELEASE ORAL at 09:44

## 2020-09-14 NOTE — PROGRESS NOTE ADULT - SUBJECTIVE AND OBJECTIVE BOX
no acute events overnight  last HD 9/12 w UF 2.6 L  sitting comfortably in the chair  no active complaints at present      Meds:  acetaminophen    Suspension .. 650 every 6 hours PRN  ampicillin/sulbactam  IVPB 3 every 24 hours  aspirin  chewable 81 daily  carvedilol 25 every 12 hours  chlorhexidine 2% Cloths 1 daily  dextrose 40% Gel 15 once PRN  dextrose 5%. 1000 <Continuous>  dextrose 50% Injectable 12.5 once  dextrose 50% Injectable 25 once  dextrose 50% Injectable 25 once  doxazosin 1 at bedtime  glucagon  Injectable 1 once PRN  heparin   Injectable 5000 every 8 hours  insulin glargine Injectable (LANTUS) 10 every morning  insulin lispro (HumaLOG) corrective regimen sliding scale  Before meals and at bedtime  levothyroxine 75 daily  oxyCODONE    Solution 5 every 6 hours PRN  oxyCODONE    Solution 10 every 6 hours PRN  pantoprazole  Injectable 40 daily  polyethylene glycol 3350 17 daily  senna 2 at bedtime      T(C): , Max: 37.8 (09-13-20 @ 19:03)  T(F): , Max: 100 (09-13-20 @ 19:03)  HR: 82 (09-14-20 @ 17:36)  BP: 160/72 (09-14-20 @ 17:36)  BP(mean): 103 (09-14-20 @ 17:36)  RR: 14 (09-14-20 @ 17:36)  SpO2: 100% (09-14-20 @ 17:36)  Wt(kg): --    09-13 @ 07:01  -  09-14 @ 07:00  --------------------------------------------------------  IN: 1522 mL / OUT: 130 mL / NET: 1392 mL    09-14 @ 07:01  -  09-14 @ 17:48  --------------------------------------------------------  IN: 474 mL / OUT: 110 mL / NET: 364 mL      PHYSICAL EXAM:  GENERAL: alert, no acute distress at present  NECK: No JVD, trach in place, FiO2 at 40%  CHEST/LUNG: equal breath sounds bilaterally  HEART: normal S1S2, RRR  EXTREMITIES: No LE edema bilateral  ACCESS: LUE AVF, good thrill and bruit appreciated      LABS:                        9.3    14.20 )-----------( 392      ( 14 Sep 2020 07:28 )             28.7     09-14    133<L>  |  88<L>  |  95<H>  ----------------------------<  240<H>  4.3   |  27  |  7.51<H>    Ca    9.3      14 Sep 2020 07:28    TPro  6.5  /  Alb  3.1<L>  /  TBili  0.2  /  DBili  x   /  AST  17  /  ALT  <5<L>  /  AlkPhos  139<H>  09-14        RADIOLOGY & ADDITIONAL STUDIES:    reviewed

## 2020-09-14 NOTE — PROGRESS NOTE ADULT - ATTENDING COMMENTS
Net negative 2.6L Sat->Sun, saturating well, no complaints of swelling or SOB. Lytes acceptable. Will plan for dialysis tomorrow per schedule.

## 2020-09-14 NOTE — CHART NOTE - NSCHARTNOTEFT_GEN_A_CORE
Admitting Diagnosis:   Patient is a 64y old  Male who presents with a chief complaint of Post surgical care (14 Sep 2020 07:49)      PAST MEDICAL & SURGICAL HISTORY:  Hypothyroidism    Lower extremity edema    Hyperlipidemia    BPH (benign prostatic hyperplasia)    Type II diabetes mellitus    Essential hypertension    Chronic kidney disease (CKD), stage V  Hemodialysis    AV fistula  left UE    S/P appendectomy        Current Nutrition Order:  NPO w/ TF: 6 cans of Nepro 1.8/day via PEG. Provides 1422ml TV, 2559kcal, 115g pro, 1033ml FW.     PO Intake: Good (%) [   ]  Fair (50-75%) [   ] Poor (<25%) [   ] N/A NPO    GI Issues:   No apparent GI distress  Had 2 BMs o/n  Ordered for miralax, senna, protonix.     Pain:  Pain being medically managed  Ordered for oxycodone, tylenol    Skin Integrity:  L AV fistula  Surgical incision  Fantasma score 19        Labs:   09-14    133<L>  |  88<L>  |  95<H>  ----------------------------<  240<H>  4.3   |  27  |  7.51<H>    Ca    9.3      14 Sep 2020 07:28    TPro  6.5  /  Alb  3.1<L>  /  TBili  0.2  /  DBili  x   /  AST  17  /  ALT  <5<L>  /  AlkPhos  139<H>  09-14    CAPILLARY BLOOD GLUCOSE      POCT Blood Glucose.: 356 mg/dL (14 Sep 2020 11:24)  POCT Blood Glucose.: 337 mg/dL (14 Sep 2020 11:22)  POCT Blood Glucose.: 236 mg/dL (14 Sep 2020 06:15)  POCT Blood Glucose.: 324 mg/dL (13 Sep 2020 22:06)  POCT Blood Glucose.: 156 mg/dL (13 Sep 2020 16:00)      Medications:  MEDICATIONS  (STANDING):  ampicillin/sulbactam  IVPB 3 Gram(s) IV Intermittent every 24 hours  aspirin  chewable 81 milliGRAM(s) Oral daily  carvedilol 25 milliGRAM(s) Oral every 12 hours  chlorhexidine 2% Cloths 1 Application(s) Topical daily  dextrose 5%. 1000 milliLiter(s) (50 mL/Hr) IV Continuous <Continuous>  dextrose 50% Injectable 12.5 Gram(s) IV Push once  dextrose 50% Injectable 25 Gram(s) IV Push once  dextrose 50% Injectable 25 Gram(s) IV Push once  doxazosin 1 milliGRAM(s) Oral at bedtime  heparin   Injectable 5000 Unit(s) SubCutaneous every 8 hours  insulin glargine Injectable (LANTUS) 10 Unit(s) SubCutaneous every morning  insulin lispro (HumaLOG) corrective regimen sliding scale   SubCutaneous Before meals and at bedtime  levothyroxine 75 MICROGram(s) Oral daily  pantoprazole  Injectable 40 milliGRAM(s) IV Push daily  polyethylene glycol 3350 17 Gram(s) Oral daily  senna 2 Tablet(s) Oral at bedtime    MEDICATIONS  (PRN):  acetaminophen    Suspension .. 650 milliGRAM(s) Oral every 6 hours PRN Mild Pain (1 - 3)  dextrose 40% Gel 15 Gram(s) Oral once PRN Blood Glucose LESS THAN 70 milliGRAM(s)/deciliter  glucagon  Injectable 1 milliGRAM(s) IntraMuscular once PRN Glucose LESS THAN 70 milligrams/deciliter  oxyCODONE    Solution 5 milliGRAM(s) Oral every 6 hours PRN Moderate Pain (4 - 6)  oxyCODONE    Solution 10 milliGRAM(s) Oral every 6 hours PRN Severe Pain (7 - 10)      Admitted Anthropometrics:  Height: 5'9" Weight: 160lbs, IBW 160lbs+/-10%, %%, BMI 27.4     Weight:  Pre/post-HD weights:  163.5, 161.3lbs (9/2)  166.6, 161.1lbs (9/4)  163.5, 158.5lbs (9/5)  163.5, 158.lbs (9/8)  157.8, 150lbs (9/10)  156.2, 150.4lbs (9/12)      Weight Change:   Dry weight appears to be trending down. Will continue to trend and assess need to increase tube feed regimen.     Estimated energy needs:   ABW (72.7kg) used for calculations as pt between % of IBW.   Nutrient needs based on [facility] standards of care for maintenance in older adults.   Needs adjusted for age, catabolic illness, post-op healing  2181-2545kcal/day (30-35kcal/kg)  101-116g pro/day (1.4-1.6g pro/kg)  Fluids per team 2/2 HD    Subjective:  64M w/ pmhx of ESRD, HTN, DM and T3N1M0 left tongue SCCa now s/p left hemiglossectomy, left modified radical neck dissection, right ALT free flap and tracheotomy. s/p L hemigloss, L ND, R ALT FF, NGT, trach 8LPC. s/p RTOR on 9/5 flap debridement of devascularized tissue. S/p PEG placement 9/8. Tolerating PMV well. Trach displaced; replaced w/ a 6CFS today. Planned for HD tomorrow.    Pt seen in room, resting in bed. Nutrition course remains the same. Tolerating feeds well. Had 2 BMs o/n, no reports of N/V. Downtrending of dry weights is concerning however pt is receiving 35kcal/kg of his actual body weight. If continues to go down will increase kcal/pro content in tubefeeding regimen. Needs are higher in the setting of age, dialysis, catabolic illness, and post-op healing.  POCT , 324, 156, 300mg/dL, Na 132, BUN 89, Cr 7.41, Phos 5.1. Consider addition of phos binders if to remain elevated on renal formula. RD to follow.     Previous Nutrition Diagnosis:  inadequate oral intake RT unable to take PO 2/2 hemiglossectomy, neck dissection, and tracheotomy.   Active [ x  ]  Resolved [   ]    If resolved, new PES:     Goal:  Pt to consistently meet % of estimated needs via most appropriate route    Recommendations:  1. Continue w/ bolus regimen of 6 cans of Nepro 1.8/day via NGT. Provides 1422ml TV, 2559kcal, 115g pro, 1033ml FW.   Pt can have 2 cans at breakfast, 2 cans at lunch, and 2 at dinner. Can be ordered as 474ml/hr Q8H.   2. Monitor for s/s intolerance. Maintain aspiration precautions at all times  3. If weight continues to decrease, recommend adding on additional prostat/day which will provide pt with a total of 2659kcal (36kcal/kg, 1.78g pro/kg)  4. Formal SLP eval prior to PO diet adv.   5. Please obtain updated DRY weights for accuracy and trending   **D/w team. Order placed for MD verification    Education:   asleep but nodded to tolerating feeds well.     Risk Level: High [ x  ] Moderate [   ] Low [   ].

## 2020-09-14 NOTE — PROGRESS NOTE ADULT - SUBJECTIVE AND OBJECTIVE BOX
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 s/p L hemigloss, L ND, R ALT FF, NGT, trach 8LPC     Hospital Course:  9/2: weaned off pressors in SICU. soft pressures s/p bolus of albumin and PRBC 1 unit each. otherwise stable. no sedation and no pressors during am rounds. weaned to CPAP 30%. flap doing well with strong signal. s/p 300 rectal ASA. on abx  9/3: NAEON, flap doing well. pressures stable with MAPs >65. hgb 6.7, s/p 1u PRBC > r/p hgb 8. HD tomorrow per renal. PT unable to see today b/c anemia  9/4: NAEON. flap doing well. pressures stable with MAPs >65. HD today per renal. PT today  9/5: NAEON. had flap compromise yesterday late afternoon (no venous signal, flap dusky and no bleeding pinprick). s/p RTOR with flap debridement: left neck incision opened. arterial and venous anastomosis found to be intact with good flow on doppler.  ALT flap trimmed to bleeding tissue and anterior mouth packed with gauze.  left neck incision closed with interrupted nylon suture. overnight, hgb drop 6.9 s/p transfusion > hgb 7.5. venous and arterial signals good in the am.   9/6: NAEON. Bleeding with mild scratching on flap. Otherwise doing well. Pain controlled. Breathing well. Will stepdown today. Cuff deflated yesterday.  9/7: NAEON. Bleeding with mild scratching on flap.Pain controlled. Breathing well. In stepdown today.  9/8: NAEON. Bleeding on mild scratching. Pain controlled and breathing well. PEG today with IR.  9/9: NAEON. Transfused 1PRBC with dialysis yesterday. PEG placement yesterday. Bleeding on mild scratching with strong doppler. L PEBBLES appears frothy and possible fistula tract noted on L anterior floor of mouth. Packed with 1/4in packing for now.  9/10: NAEON. Leg PEBBLES removed yesterday. Post-transfusion CBC with adequate Hg increase. Pt started on continous feeds and tolerated well. Strong doppler at bedside. Mouth was repacked bedside. Breathing well on humidified air.  9/11: NAEON. Breathing well on humidified air with trach collar. Strong doppler bedside. Mouth was repacked. Pt started on bolusf eeds and tolerating well. Will change to 4CFS today.  9/12: NAEON. Breathing well. Packing replaced.   9/13:NAEON. Tmax 100.6, Breathing well. Packing replaced.   9/14: NAEON. Tolerating PMV. Trach noted to be displaced; replaced with 6CFS at bedside.       General: AAOx3  HEENT: neck incision c/d/i, PEBBLES L neck SS, neck soft and flat. external flap doppler signal is strong for arterial. intraoral flap has exposed muscle. packing replaced FOM, debris from tongue and flap cleaned  Neck: 6CFS in place, on TC 40%, minimal fullness of neck  Extrem: WWP, no edema.  incision c/d/i on R thigh  Pulm: Nonlabored breathing, no respiratory distress.   Tracheoscopy: posterior wall granulation tissue, otherwise clear to jo ann; trach in appropriate position    Procedure note - trach change: Pt seated appropriately in bed for trach change. 4CFS trach removed. 6CFS trach inserted under direct visualization with FFL. Pt tolerated procedure well without complication.                               A/P: 64M w/ pmhx of ESRD, HTN, DM and T3N1M0 left tongue SCCa now s/p left hemiglossectomy, left modified radical neck dissection, right ALT free flap and tracheotomy. s/p L hemigloss, L ND, R ALT FF, NGT, trach 8LPC. s/p RTOR on 9/5 flap debridement of devascularized tissue  -WBC 9.6 --> 11, no clinical signs of infection. afebrile. Will f/u labs tmrw  -4CFS in place  - On bolus feeds - tolerating well  - SLP: PMV not tolerated, will repeat today   - qshift nursing flap checks - visual to see redness and color of flap, plus doppler/ signals  - HD on 9/14  - ASA 81/sqh  - Walk pt daily   - Avoid pressors, and maintain MAP 65+  - Regular trach suctioning and cleaning of inner cannula  - Monitor PEBBLES output  - Page ENT with questions/concerns

## 2020-09-14 NOTE — PROGRESS NOTE ADULT - ASSESSMENT
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 Nephrology consulted for dialysis arrangement.      # ESRD on HD TTS via LUE AVF   - s/p HD 9/12, well tolerated UF 2.6 L   - acceptable volume and electrolytes  - plan for HD 9/13 as per regular schedule   - daily weights  - renal diet  - strict I/O     # HTN  - UF with HD    # Renal bone disease  - phos, calcium noted, acceptable   - no need for binders    # Anemia  - s/p hemotransfusions  - no DANIEL in setting of malignancy

## 2020-09-14 NOTE — PROGRESS NOTE ADULT - ASSESSMENT
A/P: 64M w/ pmhx of ESRD, HTN, DM and T3N1M0 left tongue SCCa now s/p left hemiglossectomy, left modified radical neck dissection, right ALT free flap and tracheotomy. s/p L hemigloss, L ND, R ALT FF, NGT, trach 8LPC. s/p RTOR on 9/5 flap debridement of devascularized tissue  -WBC 9.6 --> 11, no clinical signs of infection. afebrile. Will f/u labs today  -6CFS in place  - On bolus feeds - tolerating well  - SLP: PMV tolerated  - qshift nursing flap checks - visual to see redness and color of flap, plus doppler/ signals  - HD on 9/14  - ASA 81/sqh  - Walk pt daily   - Avoid pressors, and maintain MAP 65+  - Regular trach suctioning and cleaning of inner cannula  - Monitor PEBBLES output  - Page ENT with questions/concerns

## 2020-09-14 NOTE — PROVIDER CONTACT NOTE (OTHER) - SITUATION
patient had oral mucous, began to light oral suction secretions. Noted blood to be possibly on the tongue.

## 2020-09-15 LAB
ALBUMIN SERPL ELPH-MCNC: 2.8 G/DL — LOW (ref 3.3–5)
ALP SERPL-CCNC: 151 U/L — HIGH (ref 40–120)
ALT FLD-CCNC: <5 U/L — LOW (ref 10–45)
ANION GAP SERPL CALC-SCNC: 21 MMOL/L — HIGH (ref 5–17)
AST SERPL-CCNC: 18 U/L — SIGNIFICANT CHANGE UP (ref 10–40)
BASOPHILS # BLD AUTO: 0.17 K/UL — SIGNIFICANT CHANGE UP (ref 0–0.2)
BASOPHILS NFR BLD AUTO: 1.8 % — SIGNIFICANT CHANGE UP (ref 0–2)
BILIRUB SERPL-MCNC: <0.2 MG/DL — SIGNIFICANT CHANGE UP (ref 0.2–1.2)
BUN SERPL-MCNC: 122 MG/DL — HIGH (ref 7–23)
BUN SERPL-MCNC: 39 MG/DL — HIGH (ref 7–23)
CALCIUM SERPL-MCNC: 9.8 MG/DL — SIGNIFICANT CHANGE UP (ref 8.4–10.5)
CHLORIDE SERPL-SCNC: 86 MMOL/L — LOW (ref 96–108)
CO2 SERPL-SCNC: 23 MMOL/L — SIGNIFICANT CHANGE UP (ref 22–31)
CREAT SERPL-MCNC: 9.28 MG/DL — HIGH (ref 0.5–1.3)
EOSINOPHIL # BLD AUTO: 0.26 K/UL — SIGNIFICANT CHANGE UP (ref 0–0.5)
EOSINOPHIL NFR BLD AUTO: 2.7 % — SIGNIFICANT CHANGE UP (ref 0–6)
GLUCOSE BLDC GLUCOMTR-MCNC: 221 MG/DL — HIGH (ref 70–99)
GLUCOSE BLDC GLUCOMTR-MCNC: 252 MG/DL — HIGH (ref 70–99)
GLUCOSE BLDC GLUCOMTR-MCNC: 265 MG/DL — HIGH (ref 70–99)
GLUCOSE BLDC GLUCOMTR-MCNC: 324 MG/DL — HIGH (ref 70–99)
GLUCOSE BLDC GLUCOMTR-MCNC: 87 MG/DL — SIGNIFICANT CHANGE UP (ref 70–99)
GLUCOSE SERPL-MCNC: 222 MG/DL — HIGH (ref 70–99)
HCT VFR BLD CALC: 27.7 % — LOW (ref 39–50)
HGB BLD-MCNC: 8.9 G/DL — LOW (ref 13–17)
LYMPHOCYTES # BLD AUTO: 0.78 K/UL — LOW (ref 1–3.3)
LYMPHOCYTES # BLD AUTO: 8 % — LOW (ref 13–44)
MAGNESIUM SERPL-MCNC: 2.9 MG/DL — HIGH (ref 1.6–2.6)
MCHC RBC-ENTMCNC: 28.6 PG — SIGNIFICANT CHANGE UP (ref 27–34)
MCHC RBC-ENTMCNC: 32.1 GM/DL — SIGNIFICANT CHANGE UP (ref 32–36)
MCV RBC AUTO: 89.1 FL — SIGNIFICANT CHANGE UP (ref 80–100)
MONOCYTES # BLD AUTO: 0.52 K/UL — SIGNIFICANT CHANGE UP (ref 0–0.9)
MONOCYTES NFR BLD AUTO: 5.3 % — SIGNIFICANT CHANGE UP (ref 2–14)
NEUTROPHILS # BLD AUTO: 7.46 K/UL — HIGH (ref 1.8–7.4)
NEUTROPHILS NFR BLD AUTO: 76.8 % — SIGNIFICANT CHANGE UP (ref 43–77)
PHOSPHATE SERPL-MCNC: 3.6 MG/DL — SIGNIFICANT CHANGE UP (ref 2.5–4.5)
PLATELET # BLD AUTO: 384 K/UL — SIGNIFICANT CHANGE UP (ref 150–400)
POTASSIUM SERPL-MCNC: 4.7 MMOL/L — SIGNIFICANT CHANGE UP (ref 3.5–5.3)
POTASSIUM SERPL-SCNC: 4.7 MMOL/L — SIGNIFICANT CHANGE UP (ref 3.5–5.3)
PROT SERPL-MCNC: 6.3 G/DL — SIGNIFICANT CHANGE UP (ref 6–8.3)
RBC # BLD: 3.11 M/UL — LOW (ref 4.2–5.8)
RBC # FLD: 14.6 % — HIGH (ref 10.3–14.5)
SODIUM SERPL-SCNC: 130 MMOL/L — LOW (ref 135–145)
WBC # BLD: 9.72 K/UL — SIGNIFICANT CHANGE UP (ref 3.8–10.5)
WBC # FLD AUTO: 9.72 K/UL — SIGNIFICANT CHANGE UP (ref 3.8–10.5)

## 2020-09-15 PROCEDURE — 90935 HEMODIALYSIS ONE EVALUATION: CPT

## 2020-09-15 RX ORDER — INSULIN GLARGINE 100 [IU]/ML
12 INJECTION, SOLUTION SUBCUTANEOUS EVERY MORNING
Refills: 0 | Status: DISCONTINUED | OUTPATIENT
Start: 2020-09-15 | End: 2020-09-18

## 2020-09-15 RX ADMIN — Medication 8: at 11:26

## 2020-09-15 RX ADMIN — HEPARIN SODIUM 5000 UNIT(S): 5000 INJECTION INTRAVENOUS; SUBCUTANEOUS at 01:45

## 2020-09-15 RX ADMIN — INSULIN GLARGINE 10 UNIT(S): 100 INJECTION, SOLUTION SUBCUTANEOUS at 07:41

## 2020-09-15 RX ADMIN — Medication 75 MICROGRAM(S): at 05:29

## 2020-09-15 RX ADMIN — OXYCODONE HYDROCHLORIDE 10 MILLIGRAM(S): 5 TABLET ORAL at 14:57

## 2020-09-15 RX ADMIN — Medication 4: at 18:57

## 2020-09-15 RX ADMIN — CARVEDILOL PHOSPHATE 25 MILLIGRAM(S): 80 CAPSULE, EXTENDED RELEASE ORAL at 05:29

## 2020-09-15 RX ADMIN — INSULIN GLARGINE 12 UNIT(S): 100 INJECTION, SOLUTION SUBCUTANEOUS at 11:26

## 2020-09-15 RX ADMIN — HEPARIN SODIUM 5000 UNIT(S): 5000 INJECTION INTRAVENOUS; SUBCUTANEOUS at 19:00

## 2020-09-15 RX ADMIN — Medication 650 MILLIGRAM(S): at 00:26

## 2020-09-15 RX ADMIN — Medication 650 MILLIGRAM(S): at 01:26

## 2020-09-15 RX ADMIN — CARVEDILOL PHOSPHATE 25 MILLIGRAM(S): 80 CAPSULE, EXTENDED RELEASE ORAL at 19:00

## 2020-09-15 RX ADMIN — PANTOPRAZOLE SODIUM 40 MILLIGRAM(S): 20 TABLET, DELAYED RELEASE ORAL at 11:25

## 2020-09-15 RX ADMIN — HEPARIN SODIUM 5000 UNIT(S): 5000 INJECTION INTRAVENOUS; SUBCUTANEOUS at 10:24

## 2020-09-15 RX ADMIN — Medication 81 MILLIGRAM(S): at 11:25

## 2020-09-15 RX ADMIN — AMPICILLIN SODIUM AND SULBACTAM SODIUM 200 GRAM(S): 250; 125 INJECTION, POWDER, FOR SUSPENSION INTRAMUSCULAR; INTRAVENOUS at 17:00

## 2020-09-15 RX ADMIN — Medication 6: at 06:29

## 2020-09-15 RX ADMIN — Medication 1 MILLIGRAM(S): at 23:12

## 2020-09-15 NOTE — PROGRESS NOTE ADULT - ATTENDING COMMENTS
ESRD HD TTS tolerating 2.5-3L UF with HD. Anemia - d/w team to please reach out to his oncologist to see if he is eligible for tx with an DANIEL given his active malignancy, we have tried to call but have not received a call back yet  BUN elevated - ?GIB ?tube feeds ?steroids. Doubt 2/2 clearance issue as K ok however will check URR

## 2020-09-15 NOTE — PROGRESS NOTE ADULT - SUBJECTIVE AND OBJECTIVE BOX
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 s/p L hemigloss, L ND, R ALT FF, NGT, trach 8LPC     Hospital Course:  9/2: weaned off pressors in SICU. soft pressures s/p bolus of albumin and PRBC 1 unit each. otherwise stable. no sedation and no pressors during am rounds. weaned to CPAP 30%. flap doing well with strong signal. s/p 300 rectal ASA. on abx  9/3: NAEON, flap doing well. pressures stable with MAPs >65. hgb 6.7, s/p 1u PRBC > r/p hgb 8. HD tomorrow per renal. PT unable to see today b/c anemia  9/4: NAEON. flap doing well. pressures stable with MAPs >65. HD today per renal. PT today  9/5: NAEON. had flap compromise yesterday late afternoon (no venous signal, flap dusky and no bleeding pinprick). s/p RTOR with flap debridement: left neck incision opened. arterial and venous anastomosis found to be intact with good flow on doppler.  ALT flap trimmed to bleeding tissue and anterior mouth packed with gauze.  left neck incision closed with interrupted nylon suture. overnight, hgb drop 6.9 s/p transfusion > hgb 7.5. venous and arterial signals good in the am.   9/6: NAEON. Bleeding with mild scratching on flap. Otherwise doing well. Pain controlled. Breathing well. Will stepdown today. Cuff deflated yesterday.  9/7: NAEON. Bleeding with mild scratching on flap.Pain controlled. Breathing well. In stepdown today.  9/8: NAEON. Bleeding on mild scratching. Pain controlled and breathing well. PEG today with IR.  9/9: NAEON. Transfused 1PRBC with dialysis yesterday. PEG placement yesterday. Bleeding on mild scratching with strong doppler. L PEBBLES appears frothy and possible fistula tract noted on L anterior floor of mouth. Packed with 1/4in packing for now.  9/10: NAEON. Leg PEBBLES removed yesterday. Post-transfusion CBC with adequate Hg increase. Pt started on continous feeds and tolerated well. Strong doppler at bedside. Mouth was repacked bedside. Breathing well on humidified air.  9/11: NAEON. Breathing well on humidified air with trach collar. Strong doppler bedside. Mouth was repacked. Pt started on bolusf eeds and tolerating well. Will change to 4CFS today.  9/12: NAEON. Breathing well. Packing replaced.   9/13:NAEON. Tmax 100.6, Breathing well. Packing replaced.   9/14: NAEON. Tolerating PMV. Trach noted to be displaced; replaced with 6CFS at bedside.   9/15: NAEON. Able to voice. Some watery stools      General: AAOx3  HEENT: neck incision c/d/i, PEBBLES L neck SS, neck soft and flat. external flap doppler signal is strong for arterial. intraoral flap has exposed muscle. packing removed from FOM  Neck: 6CFS in place, on TC 40%, minimal fullness of neck  Extrem: WWP, no edema.  incision c/d/i on R thigh  Pulm: Nonlabored breathing, no respiratory distress.                                A/P: 64M w/ pmhx of ESRD, HTN, DM and T3N1M0 left tongue SCCa now s/p left hemiglossectomy, left modified radical neck dissection, right ALT free flap and tracheotomy. s/p L hemigloss, L ND, R ALT FF, NGT, trach 8LPC. s/p RTOR on 9/5 flap debridement of devascularized tissue  -WBC downtrending, afebrile  -will d/c laxative, start fiber   -6CFS in place  - On bolus feeds - tolerating well  - SLP: PMV tolerated  - qshift nursing flap checks - visual to see redness and color of flap, plus doppler/ signals  -HD T/Th/Sat  - ASA 81/sqh  - Walk pt daily   - Avoid pressors, and maintain MAP 65+  - Regular trach suctioning and cleaning of inner cannula  - Monitor PEBBLES output  - Page ENT with questions/concerns

## 2020-09-15 NOTE — PROGRESS NOTE ADULT - ASSESSMENT
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 Nephrology consulted for dialysis arrangement.      # ESRD on HD TTS via LUE AVF   - s/p HD 9/12, well tolerated UF 2.6 L   - getting HD today as per regular schedule  - high BUN noted despite regular dialysis - will check URR today   - daily weights  - renal diet  - strict I/O     # HTN  - UF with HD    # Renal bone disease  - phos, calcium noted, acceptable   - no need for binders    # Anemia  - s/p hemotransfusions  - no DANIEL in setting of malignancy

## 2020-09-15 NOTE — PROGRESS NOTE ADULT - SUBJECTIVE AND OBJECTIVE BOX
Patient was seen and evaluated on dialysis  High BUN noted despite regular dialysis  Will check URR    Vitals:  HR: 76 (09-15-20 @ 12:05)  BP: 165/74 (09-15-20 @ 12:05)  Wt(kg): --    PHYSICAL EXAM:  GENERAL: alert, no acute distress at present  NECK: No JVD, trach in place, FiO2 at 40%  CHEST/LUNG: equal breath sounds bilaterally  HEART: normal S1S2, RRR  EXTREMITIES: No LE edema bilateral  ACCESS: LUE AVF, good thrill and bruit appreciated      Labs:  09-15    130<L>  |  86<L>  |  122<H>  ----------------------------<  222<H>  4.7   |  23  |  9.28<H>    Ca    9.8      15 Sep 2020 08:41  Phos  3.6     09-15  Mg     2.9     09-15    TPro  6.3  /  Alb  2.8<L>  /  TBili  <0.2  /  DBili  x   /  AST  18  /  ALT  <5<L>  /  AlkPhos  151<H>  09-15      Continue dialysis:   Dialyzer:   180    QB: 400  K bath: 2  Goal UF:   2.5 - 3.0     L   over        210       min  Patient is tolerating the procedure well.   Continue full treatment as prescribed.

## 2020-09-16 LAB
GLUCOSE BLDC GLUCOMTR-MCNC: 141 MG/DL — HIGH (ref 70–99)
GLUCOSE BLDC GLUCOMTR-MCNC: 248 MG/DL — HIGH (ref 70–99)
GLUCOSE BLDC GLUCOMTR-MCNC: 307 MG/DL — HIGH (ref 70–99)
GLUCOSE BLDC GLUCOMTR-MCNC: 73 MG/DL — SIGNIFICANT CHANGE UP (ref 70–99)

## 2020-09-16 PROCEDURE — 99232 SBSQ HOSP IP/OBS MODERATE 35: CPT

## 2020-09-16 RX ADMIN — OXYCODONE HYDROCHLORIDE 10 MILLIGRAM(S): 5 TABLET ORAL at 08:35

## 2020-09-16 RX ADMIN — CARVEDILOL PHOSPHATE 25 MILLIGRAM(S): 80 CAPSULE, EXTENDED RELEASE ORAL at 05:49

## 2020-09-16 RX ADMIN — Medication 4: at 22:16

## 2020-09-16 RX ADMIN — Medication 1 MILLIGRAM(S): at 22:16

## 2020-09-16 RX ADMIN — CHLORHEXIDINE GLUCONATE 1 APPLICATION(S): 213 SOLUTION TOPICAL at 05:50

## 2020-09-16 RX ADMIN — Medication 75 MICROGRAM(S): at 05:50

## 2020-09-16 RX ADMIN — PANTOPRAZOLE SODIUM 40 MILLIGRAM(S): 20 TABLET, DELAYED RELEASE ORAL at 11:47

## 2020-09-16 RX ADMIN — OXYCODONE HYDROCHLORIDE 10 MILLIGRAM(S): 5 TABLET ORAL at 16:39

## 2020-09-16 RX ADMIN — HEPARIN SODIUM 5000 UNIT(S): 5000 INJECTION INTRAVENOUS; SUBCUTANEOUS at 18:32

## 2020-09-16 RX ADMIN — Medication 81 MILLIGRAM(S): at 11:47

## 2020-09-16 RX ADMIN — Medication 8: at 11:40

## 2020-09-16 RX ADMIN — OXYCODONE HYDROCHLORIDE 10 MILLIGRAM(S): 5 TABLET ORAL at 09:35

## 2020-09-16 RX ADMIN — CARVEDILOL PHOSPHATE 25 MILLIGRAM(S): 80 CAPSULE, EXTENDED RELEASE ORAL at 18:33

## 2020-09-16 RX ADMIN — HEPARIN SODIUM 5000 UNIT(S): 5000 INJECTION INTRAVENOUS; SUBCUTANEOUS at 11:13

## 2020-09-16 RX ADMIN — HEPARIN SODIUM 5000 UNIT(S): 5000 INJECTION INTRAVENOUS; SUBCUTANEOUS at 01:00

## 2020-09-16 NOTE — PROGRESS NOTE ADULT - ASSESSMENT
63 yo M w/ pmhx of ESRD, HTN, DM and T3N1M0 left tongue SCCa now s/p left hemiglossectomy, left modified radical neck dissection, right ALT free flap and tracheotomy. Nephrology consulted for dialysis arrangement.    # ESRD on HD TTS via LUE AVF   - s/p HD 9/15, well tolerated UF 2.5 L   - getting HD today as per regular schedule  - daily weights  - renal diet  - strict I/O     # HTN  - UF with HD    # Renal bone disease  - phos, calcium noted, acceptable   - no need for binders    # Anemia  - s/p hemotransfusions  - no DANIEL in setting of malignancy

## 2020-09-16 NOTE — PROGRESS NOTE ADULT - SUBJECTIVE AND OBJECTIVE BOX
Patient is a 64y Male seen and evaluated at bedside. NAD, no SOB, leg swelling.    Meds:    acetaminophen    Suspension .. 650 every 6 hours PRN  aspirin  chewable 81 daily  carvedilol 25 every 12 hours  chlorhexidine 2% Cloths 1 daily  dextrose 40% Gel 15 once PRN  dextrose 5%. 1000 <Continuous>  dextrose 50% Injectable 12.5 once  dextrose 50% Injectable 25 once  dextrose 50% Injectable 25 once  doxazosin 1 at bedtime  glucagon  Injectable 1 once PRN  heparin   Injectable 5000 every 8 hours  insulin glargine Injectable (LANTUS) 12 every morning  insulin lispro (HumaLOG) corrective regimen sliding scale  Before meals and at bedtime  levothyroxine 75 daily  oxyCODONE    Solution 5 every 6 hours PRN  oxyCODONE    Solution 10 every 6 hours PRN  pantoprazole  Injectable 40 daily      T(C): , Max: 37 (09-16-20 @ 09:53)  T(F): , Max: 98.6 (09-16-20 @ 09:53)  HR: 76 (09-16-20 @ 16:48)  BP: 154/70 (09-16-20 @ 16:48)  BP(mean): 100 (09-16-20 @ 16:48)  RR: 8 (09-16-20 @ 16:48)  SpO2: 100% (09-16-20 @ 16:48)  Wt(kg): --    09-15 @ 07:01  -  09-16 @ 07:00  --------------------------------------------------------  IN: 2562 mL / OUT: 3615 mL / NET: -1053 mL    09-16 @ 07:01  -  09-16 @ 17:13  --------------------------------------------------------  IN: 474 mL / OUT: 220 mL / NET: 254 mL          Review of Systems: NO SOB, CP, leg swelling      PHYSICAL EXAM:  GENERAL: alert, no acute distress at present  NECK: No JVD, trach in place, FiO2 at 40%  CHEST/LUNG: equal breath sounds bilaterally  HEART: normal S1S2, RRR  EXTREMITIES: No LE edema bilateral  ACCESS: LUE AVF, good thrill      LABS:                        8.9    9.72  )-----------( 384      ( 15 Sep 2020 08:41 )             27.7     09-15    x   |  x   |  39<H>  ----------------------------<  x   x    |  x   |  x     Ca    9.8      15 Sep 2020 08:41  Phos  3.6     09-15  Mg     2.9     09-15    TPro  6.3  /  Alb  2.8<L>  /  TBili  <0.2  /  DBili  x   /  AST  18  /  ALT  <5<L>  /  AlkPhos  151<H>  09-15                RADIOLOGY & ADDITIONAL STUDIES:

## 2020-09-16 NOTE — PROGRESS NOTE ADULT - SUBJECTIVE AND OBJECTIVE BOX
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 s/p L hemigloss, L ND, R ALT FF, NGT, trach 8LPC     Hospital Course:  9/2: weaned off pressors in SICU. soft pressures s/p bolus of albumin and PRBC 1 unit each. otherwise stable. no sedation and no pressors during am rounds. weaned to CPAP 30%. flap doing well with strong signal. s/p 300 rectal ASA. on abx  9/3: NAEON, flap doing well. pressures stable with MAPs >65. hgb 6.7, s/p 1u PRBC > r/p hgb 8. HD tomorrow per renal. PT unable to see today b/c anemia  9/4: NAEON. flap doing well. pressures stable with MAPs >65. HD today per renal. PT today  9/5: NAEON. had flap compromise yesterday late afternoon (no venous signal, flap dusky and no bleeding pinprick). s/p RTOR with flap debridement: left neck incision opened. arterial and venous anastomosis found to be intact with good flow on doppler.  ALT flap trimmed to bleeding tissue and anterior mouth packed with gauze.  left neck incision closed with interrupted nylon suture. overnight, hgb drop 6.9 s/p transfusion > hgb 7.5. venous and arterial signals good in the am.   9/6: NAEON. Bleeding with mild scratching on flap. Otherwise doing well. Pain controlled. Breathing well. Will stepdown today. Cuff deflated yesterday.  9/7: NAEON. Bleeding with mild scratching on flap.Pain controlled. Breathing well. In stepdown today.  9/8: NAEON. Bleeding on mild scratching. Pain controlled and breathing well. PEG today with IR.  9/9: NAEON. Transfused 1PRBC with dialysis yesterday. PEG placement yesterday. Bleeding on mild scratching with strong doppler. L PEBBLES appears frothy and possible fistula tract noted on L anterior floor of mouth. Packed with 1/4in packing for now.  9/10: NAEON. Leg PEBBLES removed yesterday. Post-transfusion CBC with adequate Hg increase. Pt started on continous feeds and tolerated well. Strong doppler at bedside. Mouth was repacked bedside. Breathing well on humidified air.  9/11: NAEON. Breathing well on humidified air with trach collar. Strong doppler bedside. Mouth was repacked. Pt started on bolusf eeds and tolerating well. Will change to 4CFS today.  9/12: NAEON. Breathing well. Packing replaced.   9/13:NAEON. Tmax 100.6, Breathing well. Packing replaced.   9/14: NAEON. Tolerating PMV. Trach noted to be displaced; replaced with 6CFS at bedside.   9/15: NAEON. Able to voice. Some watery stools  9/16: NAEON. diarrhea resolved      General: AAOx3  HEENT: neck incision c/d/i, PEBBLES L neck SS, neck soft and flat. external flap doppler signal is strong for arterial. intraoral flap has exposed muscle. no packing in place  Neck: 6CFS in place, on TC 40%, minimal fullness of neck  Extrem: WWP, no edema.  incision c/d/i on R thigh  Pulm: Nonlabored breathing, no respiratory distress.                                A/P: 64M w/ pmhx of ESRD, HTN, DM and T3N1M0 left tongue SCCa now s/p left hemiglossectomy, left modified radical neck dissection, right ALT free flap and tracheotomy. s/p L hemigloss, L ND, R ALT FF, NGT, trach 8LPC. s/p RTOR on 9/5 flap debridement of devascularized tissue  -WBC downtrending, afebrile  -will d/c laxative, start fiber   -6CFS in place  - On bolus feeds - tolerating well  - SLP: PMV tolerated  - qshift nursing flap checks - visual to see redness and color of flap, plus doppler/ signals  -HD T/Th/Sat  - ASA 81/sqh  - Walk pt daily   - Avoid pressors, and maintain MAP 65+  - Regular trach suctioning and cleaning of inner cannula  - Monitor PEBBLES output  - Page ENT with questions/concerns

## 2020-09-16 NOTE — PROGRESS NOTE ADULT - ASSESSMENT
A/P: 64M w/ pmhx of ESRD, HTN, DM and T3N1M0 left tongue SCCa now s/p left hemiglossectomy, left modified radical neck dissection, right ALT free flap and tracheotomy. s/p L hemigloss, L ND, R ALT FF, NGT, trach 8LPC. s/p RTOR on 9/5 flap debridement of devascularized tissue  -d/c unasyn today  -6CFS in place  - On bolus feeds - tolerating well  - SLP: PMV tolerated  - qshift nursing flap checks - visual to see redness and color of flap, plus doppler/ signals  - HD T/Th/Sat  - ASA 81/sqh  - Walk pt daily   - Avoid pressors, and maintain MAP 65+  - Regular trach suctioning and cleaning of inner cannula  - Monitor PEBBLES output  - Page ENT with questions/concerns

## 2020-09-16 NOTE — CHART NOTE - NSCHARTNOTEFT_GEN_A_CORE
Admitting Diagnosis:   Patient is a 64y old  Male who presents with a chief complaint of Post surgical care (16 Sep 2020 12:43)      PAST MEDICAL & SURGICAL HISTORY:  Hypothyroidism    Lower extremity edema    Hyperlipidemia    BPH (benign prostatic hyperplasia)    Type II diabetes mellitus    Essential hypertension    Chronic kidney disease (CKD), stage V  Hemodialysis    AV fistula  left UE    S/P appendectomy      Current Nutrition Order:  NPO w/ TF: 6 cans of Nepro 1.8/day via PEG. Provides 1422ml TV, 2559kcal, 115g pro, 1033ml FW.     PO Intake: Good (%) [   ]  Fair (50-75%) [   ] Poor (<25%) [   ] N/A NPO    GI Issues:   Has been having persistent loose stool  Suspect it was d/t aggressive bowel regimen from prior constipation.  Bowel regimen d/c'd 9/15. Continues to have loose stool but improving in frequency and consistency. If continues throughout the day, will recommend discontinuing prostat.     Pain:  Ordered for tylenol, oxycodone  No pain reported    Skin Integrity:  L AV fistula  Surgical incision  Fantasma score 18    Labs:   09-15    x   |  x   |  39<H>  ----------------------------<  x   x    |  x   |  x     Ca    9.8      15 Sep 2020 08:41  Phos  3.6     09-15  Mg     2.9     09-15    TPro  6.3  /  Alb  2.8<L>  /  TBili  <0.2  /  DBili  x   /  AST  18  /  ALT  <5<L>  /  AlkPhos  151<H>  09-15    CAPILLARY BLOOD GLUCOSE      POCT Blood Glucose.: 307 mg/dL (16 Sep 2020 11:23)  POCT Blood Glucose.: 141 mg/dL (16 Sep 2020 06:15)  POCT Blood Glucose.: 87 mg/dL (15 Sep 2020 21:56)  POCT Blood Glucose.: 221 mg/dL (15 Sep 2020 16:41)      Medications:  MEDICATIONS  (STANDING):  aspirin  chewable 81 milliGRAM(s) Oral daily  carvedilol 25 milliGRAM(s) Oral every 12 hours  chlorhexidine 2% Cloths 1 Application(s) Topical daily  dextrose 5%. 1000 milliLiter(s) (50 mL/Hr) IV Continuous <Continuous>  dextrose 50% Injectable 12.5 Gram(s) IV Push once  dextrose 50% Injectable 25 Gram(s) IV Push once  dextrose 50% Injectable 25 Gram(s) IV Push once  doxazosin 1 milliGRAM(s) Oral at bedtime  heparin   Injectable 5000 Unit(s) SubCutaneous every 8 hours  insulin glargine Injectable (LANTUS) 12 Unit(s) SubCutaneous every morning  insulin lispro (HumaLOG) corrective regimen sliding scale   SubCutaneous Before meals and at bedtime  levothyroxine 75 MICROGram(s) Oral daily  pantoprazole  Injectable 40 milliGRAM(s) IV Push daily    MEDICATIONS  (PRN):  acetaminophen    Suspension .. 650 milliGRAM(s) Oral every 6 hours PRN Mild Pain (1 - 3)  dextrose 40% Gel 15 Gram(s) Oral once PRN Blood Glucose LESS THAN 70 milliGRAM(s)/deciliter  glucagon  Injectable 1 milliGRAM(s) IntraMuscular once PRN Glucose LESS THAN 70 milligrams/deciliter  oxyCODONE    Solution 5 milliGRAM(s) Oral every 6 hours PRN Moderate Pain (4 - 6)  oxyCODONE    Solution 10 milliGRAM(s) Oral every 6 hours PRN Severe Pain (7 - 10)      Admitted Anthropometrics:  Height: 5'9" Weight: 160lbs, IBW 160lbs+/-10%, %%, BMI 27.4     Weight:  Pre/post-HD weights:  163.5, 161.3lbs (9/2)  166.6, 161.1lbs (9/4)  163.5, 158.5lbs (9/5)  163.5, 158.lbs (9/8)  157.8, 150lbs (9/10)  156.2, 150.4lbs (9/12)  158.3, 153lbs (9/15)    Weight Change:   Dry weight appears to be trending down. Will continue to trend and assess need to increase tube feed regimen.     Estimated energy needs:   ABW (72.7kg) used for calculations as pt between % of IBW.   Nutrient needs based on [facility] standards of care for maintenance in older adults.   Needs adjusted for age, catabolic illness, post-op healing  2181-2545kcal/day (30-35kcal/kg)  101-116g pro/day (1.4-1.6g pro/kg)  Fluids per team 2/2 HD    Subjective:  64M w/ pmhx of ESRD, HTN, DM and T3N1M0 left tongue SCCa now s/p left hemiglossectomy, left modified radical neck dissection, right ALT free flap and tracheotomy. s/p L hemigloss, L ND, R ALT FF, NGT, trach 8LPC. s/p RTOR on 9/5 flap debridement of devascularized tissue. S/p PEG placement 9/8. Tolerating PMV well. Trach displaced; replaced w/ a 6CFS 9/14. Last HD 9/15.    Pt seen in room, resting in bed. Nutrition course remains the same. Now receiving a prostat w/ feeds. Of note, pt has been having diarrhea over last 2 days. Suspect it was d/t aggressive bowel regimen pt was placed on after a week of constipation. Will continue to follow as BMs appear to be getting less frequent and thicker in consistency. Followed up with RN midday and pt has not had another diarrheal episode since this morning. If it does persist, would recommend discontinuing prostat (*aged ENT to discuss). Tolerating feeds well. Had 2 BMs o/n, no reports of N/V. Downtrending of dry weights is concerning however pt is receiving 36kcal/kg of his actual body weight. If continues to go down will readjust EN regimen. Needs are higher in the setting of age, dialysis, catabolic illness, and post-op healing.  POCT , 87, 221, 324, BUN 39, Cr 9.28, Na 130, Mg 2.9, A1C 5.9. RD to follow.     Previous Nutrition Diagnosis:  inadequate oral intake RT unable to take PO 2/2 hemiglossectomy, neck dissection, and tracheotomy.   Active [ x  ]  Resolved [   ]    If resolved, new PES:     Goal:  Pt to consistently meet % of estimated needs via most appropriate route    Recommendations:  1. Continue w/ bolus regimen of 6 cans of Nepro 1.8/day +1 prostats/day via PEG. Provides 1422ml TV, 2659kcal, 130g pro, 1033ml FW. (36kcal/kg, 1.78g pro/kg)  Pt can have 2 cans at breakfast, 2 cans at lunch, 1 prostat w/ FW flush, and 2 cans at dinner. Can be ordered as 474ml/hr Q8H.   2. Monitor for s/s intolerance. Maintain aspiration precautions at all times  3. Formal SLP eval prior to PO diet adv.   4. Please obtain updated DRY weights for accuracy and trending  5. Consider metamucil 1 packet/day if watery stool persists   **D/w team. Order placed for MD verification    Education:   Pt understanding of adjustments in medication and enteral orders relative to diarrhea    Risk Level: High [ x  ] Moderate [   ] Low [   ].

## 2020-09-17 LAB
ALBUMIN SERPL ELPH-MCNC: 2.7 G/DL — LOW (ref 3.3–5)
ALP SERPL-CCNC: 148 U/L — HIGH (ref 40–120)
ALT FLD-CCNC: <5 U/L — LOW (ref 10–45)
ANION GAP SERPL CALC-SCNC: 16 MMOL/L — SIGNIFICANT CHANGE UP (ref 5–17)
AST SERPL-CCNC: 16 U/L — SIGNIFICANT CHANGE UP (ref 10–40)
BASOPHILS # BLD AUTO: 0.04 K/UL — SIGNIFICANT CHANGE UP (ref 0–0.2)
BASOPHILS NFR BLD AUTO: 0.5 % — SIGNIFICANT CHANGE UP (ref 0–2)
BILIRUB SERPL-MCNC: 0.2 MG/DL — SIGNIFICANT CHANGE UP (ref 0.2–1.2)
BUN SERPL-MCNC: 92 MG/DL — HIGH (ref 7–23)
CALCIUM SERPL-MCNC: 9.5 MG/DL — SIGNIFICANT CHANGE UP (ref 8.4–10.5)
CHLORIDE SERPL-SCNC: 88 MMOL/L — LOW (ref 96–108)
CO2 SERPL-SCNC: 29 MMOL/L — SIGNIFICANT CHANGE UP (ref 22–31)
CREAT SERPL-MCNC: 7.49 MG/DL — HIGH (ref 0.5–1.3)
EOSINOPHIL # BLD AUTO: 0.22 K/UL — SIGNIFICANT CHANGE UP (ref 0–0.5)
EOSINOPHIL NFR BLD AUTO: 3 % — SIGNIFICANT CHANGE UP (ref 0–6)
GLUCOSE BLDC GLUCOMTR-MCNC: 101 MG/DL — HIGH (ref 70–99)
GLUCOSE BLDC GLUCOMTR-MCNC: 124 MG/DL — HIGH (ref 70–99)
GLUCOSE BLDC GLUCOMTR-MCNC: 229 MG/DL — HIGH (ref 70–99)
GLUCOSE BLDC GLUCOMTR-MCNC: 266 MG/DL — HIGH (ref 70–99)
GLUCOSE SERPL-MCNC: 186 MG/DL — HIGH (ref 70–99)
HCT VFR BLD CALC: 26 % — LOW (ref 39–50)
HGB BLD-MCNC: 8.4 G/DL — LOW (ref 13–17)
IMM GRANULOCYTES NFR BLD AUTO: 5.4 % — HIGH (ref 0–1.5)
LYMPHOCYTES # BLD AUTO: 0.85 K/UL — LOW (ref 1–3.3)
LYMPHOCYTES # BLD AUTO: 11.7 % — LOW (ref 13–44)
MAGNESIUM SERPL-MCNC: 2.8 MG/DL — HIGH (ref 1.6–2.6)
MCHC RBC-ENTMCNC: 29.1 PG — SIGNIFICANT CHANGE UP (ref 27–34)
MCHC RBC-ENTMCNC: 32.3 GM/DL — SIGNIFICANT CHANGE UP (ref 32–36)
MCV RBC AUTO: 90 FL — SIGNIFICANT CHANGE UP (ref 80–100)
MONOCYTES # BLD AUTO: 1.07 K/UL — HIGH (ref 0–0.9)
MONOCYTES NFR BLD AUTO: 14.7 % — HIGH (ref 2–14)
NEUTROPHILS # BLD AUTO: 4.71 K/UL — SIGNIFICANT CHANGE UP (ref 1.8–7.4)
NEUTROPHILS NFR BLD AUTO: 64.7 % — SIGNIFICANT CHANGE UP (ref 43–77)
NRBC # BLD: 0 /100 WBCS — SIGNIFICANT CHANGE UP (ref 0–0)
PHOSPHATE SERPL-MCNC: 3.6 MG/DL — SIGNIFICANT CHANGE UP (ref 2.5–4.5)
PLATELET # BLD AUTO: 379 K/UL — SIGNIFICANT CHANGE UP (ref 150–400)
POTASSIUM SERPL-MCNC: 4.4 MMOL/L — SIGNIFICANT CHANGE UP (ref 3.5–5.3)
POTASSIUM SERPL-SCNC: 4.4 MMOL/L — SIGNIFICANT CHANGE UP (ref 3.5–5.3)
PROT SERPL-MCNC: 5.8 G/DL — LOW (ref 6–8.3)
RBC # BLD: 2.89 M/UL — LOW (ref 4.2–5.8)
RBC # FLD: 14.5 % — SIGNIFICANT CHANGE UP (ref 10.3–14.5)
SODIUM SERPL-SCNC: 133 MMOL/L — LOW (ref 135–145)
WBC # BLD: 7.28 K/UL — SIGNIFICANT CHANGE UP (ref 3.8–10.5)
WBC # FLD AUTO: 7.28 K/UL — SIGNIFICANT CHANGE UP (ref 3.8–10.5)

## 2020-09-17 PROCEDURE — 90935 HEMODIALYSIS ONE EVALUATION: CPT

## 2020-09-17 RX ADMIN — CHLORHEXIDINE GLUCONATE 1 APPLICATION(S): 213 SOLUTION TOPICAL at 05:52

## 2020-09-17 RX ADMIN — PANTOPRAZOLE SODIUM 40 MILLIGRAM(S): 20 TABLET, DELAYED RELEASE ORAL at 11:06

## 2020-09-17 RX ADMIN — Medication 75 MICROGRAM(S): at 05:50

## 2020-09-17 RX ADMIN — CARVEDILOL PHOSPHATE 25 MILLIGRAM(S): 80 CAPSULE, EXTENDED RELEASE ORAL at 17:56

## 2020-09-17 RX ADMIN — Medication 81 MILLIGRAM(S): at 11:05

## 2020-09-17 RX ADMIN — Medication 1 MILLIGRAM(S): at 22:15

## 2020-09-17 RX ADMIN — CARVEDILOL PHOSPHATE 25 MILLIGRAM(S): 80 CAPSULE, EXTENDED RELEASE ORAL at 05:51

## 2020-09-17 RX ADMIN — Medication 6: at 22:11

## 2020-09-17 RX ADMIN — HEPARIN SODIUM 5000 UNIT(S): 5000 INJECTION INTRAVENOUS; SUBCUTANEOUS at 17:56

## 2020-09-17 RX ADMIN — HEPARIN SODIUM 5000 UNIT(S): 5000 INJECTION INTRAVENOUS; SUBCUTANEOUS at 01:07

## 2020-09-17 RX ADMIN — Medication 4: at 05:49

## 2020-09-17 NOTE — PROGRESS NOTE ADULT - ASSESSMENT
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 Nephrology consulted for dialysis arrangement.    # ESRD on HD TTS via LUE AVF   - s/p HD 9/15, well tolerated UF 2.5 L   - getting HD today as per regular schedule  - daily weights  - renal diet  - strict I/O     Dialyzer: Optiflux [id number]         QB: 400 mL/min         QD: 500 mL/min      K bath: 2  Goal UF: 2.5-3L                Duration: 180 min     # HTN  - UF with HD    # Renal bone disease  - phos, calcium noted, acceptable   - no need for binders    # Anemia  - s/p hemotransfusions  - no DANIEL in setting of malignancy

## 2020-09-17 NOTE — PROGRESS NOTE ADULT - SUBJECTIVE AND OBJECTIVE BOX
Patient is a 64y Male seen and evaluated at bedside during HD. NAD, no complaints.    Meds:    acetaminophen    Suspension .. 650 every 6 hours PRN  aspirin  chewable 81 daily  carvedilol 25 every 12 hours  chlorhexidine 2% Cloths 1 daily  dextrose 40% Gel 15 once PRN  dextrose 5%. 1000 <Continuous>  dextrose 50% Injectable 12.5 once  dextrose 50% Injectable 25 once  dextrose 50% Injectable 25 once  doxazosin 1 at bedtime  glucagon  Injectable 1 once PRN  heparin   Injectable 5000 every 8 hours  insulin glargine Injectable (LANTUS) 12 every morning  insulin lispro (HumaLOG) corrective regimen sliding scale  Before meals and at bedtime  levothyroxine 75 daily  pantoprazole  Injectable 40 daily      T(C): , Max: 37.2 (09-17-20 @ 14:48)  T(F): , Max: 99 (09-17-20 @ 14:48)  HR: 80 (09-17-20 @ 13:20)  BP: 170/76 (09-17-20 @ 13:20)  BP(mean): 108 (09-17-20 @ 11:03)  RR: 11 (09-17-20 @ 13:20)  SpO2: 100% (09-17-20 @ 13:20)  Wt(kg): --    09-16 @ 07:01  -  09-17 @ 07:00  --------------------------------------------------------  IN: 1542 mL / OUT: 333 mL / NET: 1209 mL    09-17 @ 07:01  -  09-17 @ 17:07  --------------------------------------------------------  IN: 0 mL / OUT: 3162 mL / NET: -3162 mL          Review of Systems: No CP, SOB, leg swelling      PHYSICAL EXAM:  GENERAL: alert, no acute distress at present  NECK: No JVD, trach in place, room air  CHEST/LUNG: equal breath sounds bilaterally  HEART: normal S1S2, RRR  EXTREMITIES: No LE edema bilateral  ACCESS: LUE AVF, good thrill      LABS:                        8.4    7.28  )-----------( 379      ( 17 Sep 2020 06:09 )             26.0     09-17    133<L>  |  88<L>  |  92<H>  ----------------------------<  186<H>  4.4   |  29  |  7.49<H>    Ca    9.5      17 Sep 2020 06:09  Phos  3.6     09-17  Mg     2.8     09-17    TPro  5.8<L>  /  Alb  2.7<L>  /  TBili  0.2  /  DBili  x   /  AST  16  /  ALT  <5<L>  /  AlkPhos  148<H>  09-17                RADIOLOGY & ADDITIONAL STUDIES:

## 2020-09-17 NOTE — PROGRESS NOTE ADULT - SUBJECTIVE AND OBJECTIVE BOX
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 s/p L hemigloss, L ND, R ALT FF, NGT, trach 8LPC     Hospital Course:  9/2: weaned off pressors in SICU. soft pressures s/p bolus of albumin and PRBC 1 unit each. otherwise stable. no sedation and no pressors during am rounds. weaned to CPAP 30%. flap doing well with strong signal. s/p 300 rectal ASA. on abx  9/3: NAEON, flap doing well. pressures stable with MAPs >65. hgb 6.7, s/p 1u PRBC > r/p hgb 8. HD tomorrow per renal. PT unable to see today b/c anemia  9/4: NAEON. flap doing well. pressures stable with MAPs >65. HD today per renal. PT today  9/5: NAEON. had flap compromise yesterday late afternoon (no venous signal, flap dusky and no bleeding pinprick). s/p RTOR with flap debridement: left neck incision opened. arterial and venous anastomosis found to be intact with good flow on doppler.  ALT flap trimmed to bleeding tissue and anterior mouth packed with gauze.  left neck incision closed with interrupted nylon suture. overnight, hgb drop 6.9 s/p transfusion > hgb 7.5. venous and arterial signals good in the am.   9/6: NAEON. Bleeding with mild scratching on flap. Otherwise doing well. Pain controlled. Breathing well. Will stepdown today. Cuff deflated yesterday.  9/7: NAEON. Bleeding with mild scratching on flap.Pain controlled. Breathing well. In stepdown today.  9/8: NAEON. Bleeding on mild scratching. Pain controlled and breathing well. PEG today with IR.  9/9: NAEON. Transfused 1PRBC with dialysis yesterday. PEG placement yesterday. Bleeding on mild scratching with strong doppler. L PEBBLES appears frothy and possible fistula tract noted on L anterior floor of mouth. Packed with 1/4in packing for now.  9/10: NAEON. Leg PEBBLES removed yesterday. Post-transfusion CBC with adequate Hg increase. Pt started on continous feeds and tolerated well. Strong doppler at bedside. Mouth was repacked bedside. Breathing well on humidified air.  9/11: NAEON. Breathing well on humidified air with trach collar. Strong doppler bedside. Mouth was repacked. Pt started on bolusf eeds and tolerating well. Will change to 4CFS today.  9/12: NAEON. Breathing well. Packing replaced.   9/13:NAEON. Tmax 100.6, Breathing well. Packing replaced.   9/14: NAEON. Tolerating PMV. Trach noted to be displaced; replaced with 6CFS at bedside.   9/15: NAEON. Able to voice. Some watery stools  9/16: NAEON. diarrhea resolved  9/17: NAEON. 6cfs changed to 4cfs      General: AAOx3  HEENT: neck incision c/d/i, PEBBLES L neck SS, neck soft and flat. external flap doppler signal is strong for arterial. intraoral flap has exposed muscle. no packing in place  Neck: 4CFS in place, on TC 40%, minimal fullness of neck  Extrem: WWP, no edema.  incision c/d/i on R thigh  Pulm: Nonlabored breathing, no respiratory distress.   Tracheoscopy: posterior granulation tissue resolved, scope clear to jo ann after placement of 4CFS    Procedure note: Pt supine in appropriate position. 6CFS tracheostomy removed. Stoma appears wnl. 4CFS introduced without issue. See tracheoscopy above. Patient tolerated procedure well without complication. Voicing well with PMV.                                A/P: 64M w/ pmhx of ESRD, HTN, DM and T3N1M0 left tongue SCCa now s/p left hemiglossectomy, left modified radical neck dissection, right ALT free flap and tracheotomy. s/p L hemigloss, L ND, R ALT FF, NGT, trach 8LPC. s/p RTOR on 9/5 flap debridement of devascularized tissue  -WBC downtrending, afebrile  -will d/c laxative, start fiber   -4CFS in place  - On bolus feeds - tolerating well  - SLP: PMV tolerated  - qshift nursing flap checks - visual to see redness and color of flap, plus doppler/ signals  -HD T/Th/Sat  - ASA 81/sqh  - Walk pt daily   - Avoid pressors, and maintain MAP 65+  - Regular trach suctioning and cleaning of inner cannula  - Monitor PEBBLES output  - Page ENT with questions/concerns

## 2020-09-18 ENCOUNTER — TRANSCRIPTION ENCOUNTER (OUTPATIENT)
Age: 65
End: 2020-09-18

## 2020-09-18 VITALS — TEMPERATURE: 98 F

## 2020-09-18 LAB
GLUCOSE BLDC GLUCOMTR-MCNC: 151 MG/DL — HIGH (ref 70–99)
GLUCOSE BLDC GLUCOMTR-MCNC: 268 MG/DL — HIGH (ref 70–99)

## 2020-09-18 PROCEDURE — 85018 HEMOGLOBIN: CPT

## 2020-09-18 PROCEDURE — P9045: CPT

## 2020-09-18 PROCEDURE — 88331 PATH CONSLTJ SURG 1 BLK 1SPC: CPT

## 2020-09-18 PROCEDURE — 97110 THERAPEUTIC EXERCISES: CPT

## 2020-09-18 PROCEDURE — 83036 HEMOGLOBIN GLYCOSYLATED A1C: CPT

## 2020-09-18 PROCEDURE — 36415 COLL VENOUS BLD VENIPUNCTURE: CPT

## 2020-09-18 PROCEDURE — 74176 CT ABD & PELVIS W/O CONTRAST: CPT

## 2020-09-18 PROCEDURE — 84443 ASSAY THYROID STIM HORMONE: CPT

## 2020-09-18 PROCEDURE — 86803 HEPATITIS C AB TEST: CPT

## 2020-09-18 PROCEDURE — 86704 HEP B CORE ANTIBODY TOTAL: CPT

## 2020-09-18 PROCEDURE — 85027 COMPLETE CBC AUTOMATED: CPT

## 2020-09-18 PROCEDURE — 85610 PROTHROMBIN TIME: CPT

## 2020-09-18 PROCEDURE — 87040 BLOOD CULTURE FOR BACTERIA: CPT

## 2020-09-18 PROCEDURE — 80048 BASIC METABOLIC PNL TOTAL CA: CPT

## 2020-09-18 PROCEDURE — 85730 THROMBOPLASTIN TIME PARTIAL: CPT

## 2020-09-18 PROCEDURE — 86923 COMPATIBILITY TEST ELECTRIC: CPT

## 2020-09-18 PROCEDURE — 86850 RBC ANTIBODY SCREEN: CPT

## 2020-09-18 PROCEDURE — C1889: CPT

## 2020-09-18 PROCEDURE — 97116 GAIT TRAINING THERAPY: CPT

## 2020-09-18 PROCEDURE — P9016: CPT

## 2020-09-18 PROCEDURE — 86706 HEP B SURFACE ANTIBODY: CPT

## 2020-09-18 PROCEDURE — 82040 ASSAY OF SERUM ALBUMIN: CPT

## 2020-09-18 PROCEDURE — 36430 TRANSFUSION BLD/BLD COMPNT: CPT

## 2020-09-18 PROCEDURE — 83605 ASSAY OF LACTIC ACID: CPT

## 2020-09-18 PROCEDURE — 87340 HEPATITIS B SURFACE AG IA: CPT

## 2020-09-18 PROCEDURE — 84520 ASSAY OF UREA NITROGEN: CPT

## 2020-09-18 PROCEDURE — 90935 HEMODIALYSIS ONE EVALUATION: CPT

## 2020-09-18 PROCEDURE — 92507 TX SP LANG VOICE COMM INDIV: CPT

## 2020-09-18 PROCEDURE — 83735 ASSAY OF MAGNESIUM: CPT

## 2020-09-18 PROCEDURE — 84295 ASSAY OF SERUM SODIUM: CPT

## 2020-09-18 PROCEDURE — 92597 ORAL SPEECH DEVICE EVAL: CPT

## 2020-09-18 PROCEDURE — 94003 VENT MGMT INPAT SUBQ DAY: CPT

## 2020-09-18 PROCEDURE — 97161 PT EVAL LOW COMPLEX 20 MIN: CPT

## 2020-09-18 PROCEDURE — 80053 COMPREHEN METABOLIC PANEL: CPT

## 2020-09-18 PROCEDURE — 88305 TISSUE EXAM BY PATHOLOGIST: CPT

## 2020-09-18 PROCEDURE — 94002 VENT MGMT INPAT INIT DAY: CPT

## 2020-09-18 PROCEDURE — 97530 THERAPEUTIC ACTIVITIES: CPT

## 2020-09-18 PROCEDURE — 88309 TISSUE EXAM BY PATHOLOGIST: CPT

## 2020-09-18 PROCEDURE — L8699: CPT

## 2020-09-18 PROCEDURE — 82330 ASSAY OF CALCIUM: CPT

## 2020-09-18 PROCEDURE — 86901 BLOOD TYPING SEROLOGIC RH(D): CPT

## 2020-09-18 PROCEDURE — 92522 EVALUATE SPEECH PRODUCTION: CPT

## 2020-09-18 PROCEDURE — 71045 X-RAY EXAM CHEST 1 VIEW: CPT

## 2020-09-18 PROCEDURE — 84132 ASSAY OF SERUM POTASSIUM: CPT

## 2020-09-18 PROCEDURE — 84100 ASSAY OF PHOSPHORUS: CPT

## 2020-09-18 PROCEDURE — 85025 COMPLETE CBC W/AUTO DIFF WBC: CPT

## 2020-09-18 PROCEDURE — 49440 PLACE GASTROSTOMY TUBE PERC: CPT

## 2020-09-18 PROCEDURE — 82803 BLOOD GASES ANY COMBINATION: CPT

## 2020-09-18 PROCEDURE — 73551 X-RAY EXAM OF FEMUR 1: CPT

## 2020-09-18 PROCEDURE — 82962 GLUCOSE BLOOD TEST: CPT

## 2020-09-18 RX ORDER — ASPIRIN/CALCIUM CARB/MAGNESIUM 324 MG
1 TABLET ORAL
Qty: 0 | Refills: 0 | DISCHARGE
Start: 2020-09-18

## 2020-09-18 RX ADMIN — Medication 2: at 07:09

## 2020-09-18 RX ADMIN — Medication 75 MICROGRAM(S): at 05:56

## 2020-09-18 RX ADMIN — HEPARIN SODIUM 5000 UNIT(S): 5000 INJECTION INTRAVENOUS; SUBCUTANEOUS at 02:20

## 2020-09-18 RX ADMIN — HEPARIN SODIUM 5000 UNIT(S): 5000 INJECTION INTRAVENOUS; SUBCUTANEOUS at 10:38

## 2020-09-18 RX ADMIN — INSULIN GLARGINE 12 UNIT(S): 100 INJECTION, SOLUTION SUBCUTANEOUS at 07:09

## 2020-09-18 RX ADMIN — Medication 6: at 12:07

## 2020-09-18 RX ADMIN — CARVEDILOL PHOSPHATE 25 MILLIGRAM(S): 80 CAPSULE, EXTENDED RELEASE ORAL at 05:55

## 2020-09-18 RX ADMIN — PANTOPRAZOLE SODIUM 40 MILLIGRAM(S): 20 TABLET, DELAYED RELEASE ORAL at 10:39

## 2020-09-18 RX ADMIN — Medication 81 MILLIGRAM(S): at 10:38

## 2020-09-18 RX ADMIN — CHLORHEXIDINE GLUCONATE 1 APPLICATION(S): 213 SOLUTION TOPICAL at 06:18

## 2020-09-18 NOTE — DISCHARGE NOTE PROVIDER - HOSPITAL COURSE
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 s/p L hemigloss, L ND, R ALT FF, NGT, trach 8LPC     Hospital Course:  9/2: weaned off pressors in SICU. soft pressures s/p bolus of albumin and PRBC 1 unit each. otherwise stable. no sedation and no pressors during am rounds. weaned to CPAP 30%. flap doing well with strong signal. s/p 300 rectal ASA. on abx  9/3: NAEON, flap doing well. pressures stable with MAPs >65. hgb 6.7, s/p 1u PRBC > r/p hgb 8. HD tomorrow per renal. PT unable to see today b/c anemia  9/4: NAEON. flap doing well. pressures stable with MAPs >65. HD today per renal. PT today  9/5: NAEON. had flap compromise yesterday late afternoon (no venous signal, flap dusky and no bleeding pinprick). s/p RTOR with flap debridement: left neck incision opened. arterial and venous anastomosis found to be intact with good flow on doppler.  ALT flap trimmed to bleeding tissue and anterior mouth packed with gauze.  left neck incision closed with interrupted nylon suture. overnight, hgb drop 6.9 s/p transfusion > hgb 7.5. venous and arterial signals good in the am.   9/6: NAEON. Bleeding with mild scratching on flap. Otherwise doing well. Pain controlled. Breathing well. Will stepdown today. Cuff deflated yesterday.  9/7: NAEON. Bleeding with mild scratching on flap.Pain controlled. Breathing well. In stepdown today.  9/8: NAEON. Bleeding on mild scratching. Pain controlled and breathing well. PEG today with IR.  9/9: NAEON. Transfused 1PRBC with dialysis yesterday. PEG placement yesterday. Bleeding on mild scratching with strong doppler. L PEBBLES appears frothy and possible fistula tract noted on L anterior floor of mouth. Packed with 1/4in packing for now.  9/10: NAEON. Leg PEBBLES removed yesterday. Post-transfusion CBC with adequate Hg increase. Pt started on continous feeds and tolerated well. Strong doppler at bedside. Mouth was repacked bedside. Breathing well on humidified air.  9/11: NAEON. Breathing well on humidified air with trach collar. Strong doppler bedside. Mouth was repacked. Pt started on bolusf eeds and tolerating well. Will change to 4CFS today.  9/12: NAEON. Breathing well. Packing replaced.   9/13:NAEON. Tmax 100.6, Breathing well. Packing replaced.   9/14: NAEON. Tolerating PMV. Trach noted to be displaced; replaced with 6CFS at bedside.   9/15: NAEON. Able to voice. Some watery stools  9/16: NAEON. diarrhea resolved  9/17: NAEON. 6cfs changed to 4cfs  9/18: NAEON. Ambulating well. Tolerating bolus feeds. Pain controlled. Pt and wife instructed on PEG feeds, trach care, and wound care.  Safe for discharge home with home PT and walker.     General: AAOx3  HEENT: neck incision c/d/i, PEBBLES L neck SS, neck soft and flat. external flap doppler signal is strong for arterial. intraoral flap has exposed muscle. no packing in place  Neck: 4CFS in place, minimal fullness of neck  Extrem: WWP, no edema.  incision c/d/i on R thigh  Pulm: Nonlabored breathing, no respiratory distress.

## 2020-09-18 NOTE — DISCHARGE NOTE PROVIDER - INSTRUCTIONS
Continue tube feeds as in hospital. 2 cans nepro for breakfast, lunch, dinner. 1 can prostat daily. Can supplement with additional feeds as tolerated (6 cans of nepro daily is the minimum required). Continue to flush PEG tube with 250 ml of water every 6 hours; every day.

## 2020-09-18 NOTE — PROGRESS NOTE ADULT - REASON FOR ADMISSION
Post surgical care

## 2020-09-18 NOTE — DISCHARGE NOTE PROVIDER - CARE PROVIDER_API CALL
Owen Abad; DDS)  OralMaxillofacial Surgery  [address], [address]  [place] [address]  Phone: [phone]  Fax: [phone]  Established Patient  Follow Up Time: 1 week    Ez Marie  OTOLARYNGOLOGY  [address] [place] [address]  Phone: [phone]  Fax: [phone]  Established Patient  Follow Up Time: 1 week

## 2020-09-18 NOTE — DISCHARGE NOTE NURSING/CASE MANAGEMENT/SOCIAL WORK - PATIENT PORTAL LINK FT
You can access the FollowMyHealth Patient Portal offered by [facility] by registering at the following website: [email or web address]. By joining [facility]’s FollowMyHealth portal, you will also be able to view your health information using other applications (apps) compatible with our system.

## 2020-09-18 NOTE — DISCHARGE NOTE PROVIDER - NSDCFUADDINST_GEN_ALL_CORE_FT
Please follow up with Dr. Ez Marie in 1 week. Please follow up with Dr. Abad in 1 week.  Clean mouth frequently, can rinse mouth with water and suction three times per day.   Clean inner cannula of tracheostomy tube daily and as needed. Replace blue velcro tie as needed  Continue to walk daily  Continue hemodialysis at established center  Continue all home medications as previously prescribed  No antibiotics needed

## 2020-09-18 NOTE — PROGRESS NOTE ADULT - PROVIDER SPECIALTY LIST ADULT
ENT
Nephrology
SICU
Surgery
SICU

## 2020-09-18 NOTE — DISCHARGE NOTE PROVIDER - NSDCMRMEDTOKEN_GEN_ALL_CORE_FT
aspirin 81 mg oral tablet, chewable: 1 tab(s) orally once a day  Coreg 25 mg oral tablet: 1 tab(s) orally 2 times a day  Flomax 0.4 mg oral capsule: 1 cap(s) orally once a day  Glucotrol XL 5 mg oral tablet, extended release: 1 tab(s) orally 2 times a day  hydrALAZINE 100 mg oral tablet: 1 tab(s) orally 3 times a day  Januvia 50 mg oral tablet: 1 tab(s) orally once a day  Lasix 40 mg oral tablet: 1 tab(s) orally once a day  Lipitor 40 mg oral tablet: 1 tab(s) orally once a day (at bedtime)  Neporth: Neprorth, 6 cans per day, via PEG tube (two cans, three times a day).    Please hold tube feeds 1 hour before and 1 hour after PO synthyroid  Norvasc 10 mg oral tablet: 1 tab(s) orally once a day  Synthroid 75 mcg (0.075 mg) oral tablet: 1 tab(s) orally once a day  Tracheostomy supplies: Humidification device    Trach tubes (6CFS)  Suction supplies (14Fr red rubber cath, suction)  Trach collars  Spare inner cannulas  Extra PMVs

## 2020-09-18 NOTE — PROGRESS NOTE ADULT - SUBJECTIVE AND OBJECTIVE BOX
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 s/p L hemigloss, L ND, R ALT FF, NGT, trach 8LPC     Hospital Course:  9/2: weaned off pressors in SICU. soft pressures s/p bolus of albumin and PRBC 1 unit each. otherwise stable. no sedation and no pressors during am rounds. weaned to CPAP 30%. flap doing well with strong signal. s/p 300 rectal ASA. on abx  9/3: NAEON, flap doing well. pressures stable with MAPs >65. hgb 6.7, s/p 1u PRBC > r/p hgb 8. HD tomorrow per renal. PT unable to see today b/c anemia  9/4: NAEON. flap doing well. pressures stable with MAPs >65. HD today per renal. PT today  9/5: NAEON. had flap compromise yesterday late afternoon (no venous signal, flap dusky and no bleeding pinprick). s/p RTOR with flap debridement: left neck incision opened. arterial and venous anastomosis found to be intact with good flow on doppler.  ALT flap trimmed to bleeding tissue and anterior mouth packed with gauze.  left neck incision closed with interrupted nylon suture. overnight, hgb drop 6.9 s/p transfusion > hgb 7.5. venous and arterial signals good in the am.   9/6: NAEON. Bleeding with mild scratching on flap. Otherwise doing well. Pain controlled. Breathing well. Will stepdown today. Cuff deflated yesterday.  9/7: NAEON. Bleeding with mild scratching on flap.Pain controlled. Breathing well. In stepdown today.  9/8: NAEON. Bleeding on mild scratching. Pain controlled and breathing well. PEG today with IR.  9/9: NAEON. Transfused 1PRBC with dialysis yesterday. PEG placement yesterday. Bleeding on mild scratching with strong doppler. L PEBBLES appears frothy and possible fistula tract noted on L anterior floor of mouth. Packed with 1/4in packing for now.  9/10: NAEON. Leg PEBBLES removed yesterday. Post-transfusion CBC with adequate Hg increase. Pt started on continous feeds and tolerated well. Strong doppler at bedside. Mouth was repacked bedside. Breathing well on humidified air.  9/11: NAEON. Breathing well on humidified air with trach collar. Strong doppler bedside. Mouth was repacked. Pt started on bolusf eeds and tolerating well. Will change to 4CFS today.  9/12: NAEON. Breathing well. Packing replaced.   9/13:NAEON. Tmax 100.6, Breathing well. Packing replaced.   9/14: NAEON. Tolerating PMV. Trach noted to be displaced; replaced with 6CFS at bedside.   9/15: NAEON. Able to voice. Some watery stools  9/16: NAEON. diarrhea resolved  9/17: NAEON. 6cfs changed to 4cfs  9/18: NAEON      General: AAOx3  HEENT: neck incision c/d/i, PEBBLES L neck SS, neck soft and flat. external flap doppler signal is strong for arterial. intraoral flap has exposed muscle. no packing in place  Neck: 4CFS in place, on TC 40%, minimal fullness of neck  Extrem: WWP, no edema.  incision c/d/i on R thigh  Pulm: Nonlabored breathing, no respiratory distress.                                A/P: 64M w/ pmhx of ESRD, HTN, DM and T3N1M0 left tongue SCCa now s/p left hemiglossectomy, left modified radical neck dissection, right ALT free flap and tracheotomy. s/p L hemigloss, L ND, R ALT FF, NGT, trach 8LPC. s/p RTOR on 9/5 flap debridement of devascularized tissue  -WBC downtrending, afebrile  -4CFS in place  - On bolus feeds - tolerating well  - SLP: PMV tolerated  - qshift nursing flap checks - visual to see redness and color of flap, plus doppler/ signals  -HD T/Th/Sat  - ASA 81/sqh  - Walk pt daily   - Avoid pressors, and maintain MAP 65+  - Regular trach suctioning and cleaning of inner cannula  - Monitor PEBBLES output  - Page ENT with questions/concerns   dispo: pending delivery of trach supplies

## 2020-09-19 ENCOUNTER — INPATIENT (INPATIENT)
Facility: HOSPITAL | Age: 65
LOS: 1 days | Discharge: HOME HEALTH | DRG: 205 | End: 2020-09-21
Attending: OTOLARYNGOLOGY | Admitting: OTOLARYNGOLOGY
Payer: MEDICARE

## 2020-09-19 VITALS
WEIGHT: 149.91 LBS | TEMPERATURE: 98 F | HEIGHT: 69 IN | SYSTOLIC BLOOD PRESSURE: 138 MMHG | OXYGEN SATURATION: 98 % | HEART RATE: 87 BPM | DIASTOLIC BLOOD PRESSURE: 68 MMHG | RESPIRATION RATE: 16 BRPM

## 2020-09-19 DIAGNOSIS — I77.0 ARTERIOVENOUS FISTULA, ACQUIRED: Chronic | ICD-10-CM

## 2020-09-19 DIAGNOSIS — Z90.49 ACQUIRED ABSENCE OF OTHER SPECIFIED PARTS OF DIGESTIVE TRACT: Chronic | ICD-10-CM

## 2020-09-19 PROBLEM — N18.5 CHRONIC KIDNEY DISEASE, STAGE 5: Chronic | Status: ACTIVE | Noted: 2020-03-25

## 2020-09-19 LAB
ANION GAP SERPL CALC-SCNC: 16 MMOL/L — SIGNIFICANT CHANGE UP (ref 5–17)
ANISOCYTOSIS BLD QL: SLIGHT — SIGNIFICANT CHANGE UP
BASOPHILS # BLD AUTO: 0.1 K/UL — SIGNIFICANT CHANGE UP (ref 0–0.2)
BASOPHILS NFR BLD AUTO: 0.9 % — SIGNIFICANT CHANGE UP (ref 0–2)
BUN SERPL-MCNC: 85 MG/DL — HIGH (ref 7–23)
CALCIUM SERPL-MCNC: 10.1 MG/DL — SIGNIFICANT CHANGE UP (ref 8.4–10.5)
CHLORIDE SERPL-SCNC: 89 MMOL/L — LOW (ref 96–108)
CO2 SERPL-SCNC: 29 MMOL/L — SIGNIFICANT CHANGE UP (ref 22–31)
CREAT SERPL-MCNC: 7.44 MG/DL — HIGH (ref 0.5–1.3)
EOSINOPHIL # BLD AUTO: 0 K/UL — SIGNIFICANT CHANGE UP (ref 0–0.5)
EOSINOPHIL NFR BLD AUTO: 0 % — SIGNIFICANT CHANGE UP (ref 0–6)
GIANT PLATELETS BLD QL SMEAR: PRESENT — SIGNIFICANT CHANGE UP
GLUCOSE BLDC GLUCOMTR-MCNC: 135 MG/DL — HIGH (ref 70–99)
GLUCOSE BLDC GLUCOMTR-MCNC: 179 MG/DL — HIGH (ref 70–99)
GLUCOSE BLDC GLUCOMTR-MCNC: 286 MG/DL — HIGH (ref 70–99)
GLUCOSE SERPL-MCNC: 260 MG/DL — HIGH (ref 70–99)
HCT VFR BLD CALC: 27.8 % — LOW (ref 39–50)
HGB BLD-MCNC: 8.9 G/DL — LOW (ref 13–17)
LYMPHOCYTES # BLD AUTO: 0.31 K/UL — LOW (ref 1–3.3)
LYMPHOCYTES # BLD AUTO: 2.7 % — LOW (ref 13–44)
MACROCYTES BLD QL: SLIGHT — SIGNIFICANT CHANGE UP
MANUAL SMEAR VERIFICATION: SIGNIFICANT CHANGE UP
MCHC RBC-ENTMCNC: 28.9 PG — SIGNIFICANT CHANGE UP (ref 27–34)
MCHC RBC-ENTMCNC: 32 GM/DL — SIGNIFICANT CHANGE UP (ref 32–36)
MCV RBC AUTO: 90.3 FL — SIGNIFICANT CHANGE UP (ref 80–100)
METAMYELOCYTES # FLD: 1.8 % — HIGH (ref 0–0)
MICROCYTES BLD QL: SLIGHT — SIGNIFICANT CHANGE UP
MONOCYTES # BLD AUTO: 1.13 K/UL — HIGH (ref 0–0.9)
MONOCYTES NFR BLD AUTO: 9.8 % — SIGNIFICANT CHANGE UP (ref 2–14)
NEUTROPHILS # BLD AUTO: 9.77 K/UL — HIGH (ref 1.8–7.4)
NEUTROPHILS NFR BLD AUTO: 84.8 % — HIGH (ref 43–77)
OVALOCYTES BLD QL SMEAR: SLIGHT — SIGNIFICANT CHANGE UP
PLAT MORPH BLD: ABNORMAL
PLATELET # BLD AUTO: 377 K/UL — SIGNIFICANT CHANGE UP (ref 150–400)
POTASSIUM SERPL-MCNC: 4.9 MMOL/L — SIGNIFICANT CHANGE UP (ref 3.5–5.3)
POTASSIUM SERPL-SCNC: 4.9 MMOL/L — SIGNIFICANT CHANGE UP (ref 3.5–5.3)
RBC # BLD: 3.08 M/UL — LOW (ref 4.2–5.8)
RBC # FLD: 14.4 % — SIGNIFICANT CHANGE UP (ref 10.3–14.5)
RBC BLD AUTO: ABNORMAL
SARS-COV-2 RNA SPEC QL NAA+PROBE: SIGNIFICANT CHANGE UP
SODIUM SERPL-SCNC: 134 MMOL/L — LOW (ref 135–145)
WBC # BLD: 11.52 K/UL — HIGH (ref 3.8–10.5)
WBC # FLD AUTO: 11.52 K/UL — HIGH (ref 3.8–10.5)

## 2020-09-19 PROCEDURE — 99285 EMERGENCY DEPT VISIT HI MDM: CPT

## 2020-09-19 PROCEDURE — 99233 SBSQ HOSP IP/OBS HIGH 50: CPT

## 2020-09-19 RX ORDER — GLUCAGON INJECTION, SOLUTION 0.5 MG/.1ML
1 INJECTION, SOLUTION SUBCUTANEOUS ONCE
Refills: 0 | Status: DISCONTINUED | OUTPATIENT
Start: 2020-09-19 | End: 2020-09-21

## 2020-09-19 RX ORDER — DOXAZOSIN MESYLATE 4 MG
1 TABLET ORAL AT BEDTIME
Refills: 0 | Status: DISCONTINUED | OUTPATIENT
Start: 2020-09-19 | End: 2020-09-21

## 2020-09-19 RX ORDER — LABETALOL HCL 100 MG
5 TABLET ORAL ONCE
Refills: 0 | Status: COMPLETED | OUTPATIENT
Start: 2020-09-19 | End: 2020-09-19

## 2020-09-19 RX ORDER — ASPIRIN/CALCIUM CARB/MAGNESIUM 324 MG
81 TABLET ORAL DAILY
Refills: 0 | Status: DISCONTINUED | OUTPATIENT
Start: 2020-09-19 | End: 2020-09-21

## 2020-09-19 RX ORDER — DEXTROSE 50 % IN WATER 50 %
25 SYRINGE (ML) INTRAVENOUS ONCE
Refills: 0 | Status: DISCONTINUED | OUTPATIENT
Start: 2020-09-19 | End: 2020-09-21

## 2020-09-19 RX ORDER — INSULIN LISPRO 100/ML
VIAL (ML) SUBCUTANEOUS
Refills: 0 | Status: DISCONTINUED | OUTPATIENT
Start: 2020-09-19 | End: 2020-09-21

## 2020-09-19 RX ORDER — ACETAMINOPHEN 500 MG
650 TABLET ORAL EVERY 4 HOURS
Refills: 0 | Status: DISCONTINUED | OUTPATIENT
Start: 2020-09-19 | End: 2020-09-21

## 2020-09-19 RX ORDER — DEXTROSE 50 % IN WATER 50 %
12.5 SYRINGE (ML) INTRAVENOUS ONCE
Refills: 0 | Status: DISCONTINUED | OUTPATIENT
Start: 2020-09-19 | End: 2020-09-21

## 2020-09-19 RX ORDER — DEXTROSE 50 % IN WATER 50 %
15 SYRINGE (ML) INTRAVENOUS ONCE
Refills: 0 | Status: DISCONTINUED | OUTPATIENT
Start: 2020-09-19 | End: 2020-09-21

## 2020-09-19 RX ORDER — SODIUM CHLORIDE 9 MG/ML
1000 INJECTION, SOLUTION INTRAVENOUS
Refills: 0 | Status: DISCONTINUED | OUTPATIENT
Start: 2020-09-19 | End: 2020-09-21

## 2020-09-19 RX ORDER — INSULIN GLARGINE 100 [IU]/ML
12 INJECTION, SOLUTION SUBCUTANEOUS AT BEDTIME
Refills: 0 | Status: DISCONTINUED | OUTPATIENT
Start: 2020-09-19 | End: 2020-09-21

## 2020-09-19 RX ORDER — OXYCODONE AND ACETAMINOPHEN 5; 325 MG/1; MG/1
1 TABLET ORAL EVERY 6 HOURS
Refills: 0 | Status: DISCONTINUED | OUTPATIENT
Start: 2020-09-19 | End: 2020-09-21

## 2020-09-19 RX ORDER — ATORVASTATIN CALCIUM 80 MG/1
40 TABLET, FILM COATED ORAL AT BEDTIME
Refills: 0 | Status: DISCONTINUED | OUTPATIENT
Start: 2020-09-19 | End: 2020-09-21

## 2020-09-19 RX ORDER — CARVEDILOL PHOSPHATE 80 MG/1
25 CAPSULE, EXTENDED RELEASE ORAL EVERY 12 HOURS
Refills: 0 | Status: DISCONTINUED | OUTPATIENT
Start: 2020-09-19 | End: 2020-09-21

## 2020-09-19 RX ORDER — HEPARIN SODIUM 5000 [USP'U]/ML
5000 INJECTION INTRAVENOUS; SUBCUTANEOUS EVERY 8 HOURS
Refills: 0 | Status: DISCONTINUED | OUTPATIENT
Start: 2020-09-19 | End: 2020-09-21

## 2020-09-19 RX ORDER — LEVOTHYROXINE SODIUM 125 MCG
75 TABLET ORAL DAILY
Refills: 0 | Status: DISCONTINUED | OUTPATIENT
Start: 2020-09-19 | End: 2020-09-21

## 2020-09-19 RX ORDER — AMLODIPINE BESYLATE 2.5 MG/1
10 TABLET ORAL DAILY
Refills: 0 | Status: DISCONTINUED | OUTPATIENT
Start: 2020-09-19 | End: 2020-09-21

## 2020-09-19 RX ORDER — OXYCODONE AND ACETAMINOPHEN 5; 325 MG/1; MG/1
2 TABLET ORAL EVERY 6 HOURS
Refills: 0 | Status: DISCONTINUED | OUTPATIENT
Start: 2020-09-19 | End: 2020-09-21

## 2020-09-19 RX ADMIN — Medication 1 MILLIGRAM(S): at 23:01

## 2020-09-19 RX ADMIN — Medication 5 MILLIGRAM(S): at 19:25

## 2020-09-19 RX ADMIN — INSULIN GLARGINE 12 UNIT(S): 100 INJECTION, SOLUTION SUBCUTANEOUS at 22:29

## 2020-09-19 RX ADMIN — HEPARIN SODIUM 5000 UNIT(S): 5000 INJECTION INTRAVENOUS; SUBCUTANEOUS at 22:12

## 2020-09-19 RX ADMIN — Medication 6: at 22:28

## 2020-09-19 RX ADMIN — ATORVASTATIN CALCIUM 40 MILLIGRAM(S): 80 TABLET, FILM COATED ORAL at 22:12

## 2020-09-19 RX ADMIN — HEPARIN SODIUM 5000 UNIT(S): 5000 INJECTION INTRAVENOUS; SUBCUTANEOUS at 13:16

## 2020-09-19 RX ADMIN — CARVEDILOL PHOSPHATE 25 MILLIGRAM(S): 80 CAPSULE, EXTENDED RELEASE ORAL at 18:42

## 2020-09-19 NOTE — PROGRESS NOTE ADULT - SUBJECTIVE AND OBJECTIVE BOX
Patient was seen and evaluated on dialysis.   HR: 82 (09-19-20 @ 14:35)  BP: 157/77 (09-19-20 @ 14:35)  Wt(kg): --  09-19    134<L>  |  89<L>  |  85<H>  ----------------------------<  260<H>  4.9   |  29  |  7.44<H>    Ca    10.1      19 Sep 2020 09:28      Continue dialysis:   Dialyzer:   180    QB: 400  K bath: 2  Goal UF:   2.5    L   over       180        min  Patient is tolerating the procedure well.   Continue full treatment as prescribed.

## 2020-09-19 NOTE — CONSULT NOTE ADULT - ASSESSMENT
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 s/p L hemigloss, L ND, R ALT FF. Readmitted for observation during capping trial of current tracheostomy.  Nephrology consulted for inpatient dialysis arrangements.      # ESRD on HD TTS via LUE AVF   - getting HD today as per regular schedule  - daily weights  - renal diet  - strict I/O     # HTN  - UF with HD    # Renal bone disease  - check phos  - not on binders    # Anemia  - Hb 8.9 g/dl, stable  - hx hemotransfusions  - no DANIEL in setting of malignancy

## 2020-09-19 NOTE — ED ADULT NURSE NOTE - CHIEF COMPLAINT QUOTE
65 y/o male recently discharged from hospital came in to ED for Dialysis, hx tongue CA. Tracheostomy to air.

## 2020-09-19 NOTE — ED PROVIDER NOTE - CLINICAL SUMMARY MEDICAL DECISION MAKING FREE TEXT BOX
s/p tongue and neck resection for cancer. pt well appearing. vss. no complaints. case d/w renal fellow and ENT. labs and covid sent. plan for admission to ENT and dialysis

## 2020-09-19 NOTE — ED PROVIDER NOTE - ATTENDING CONTRIBUTION TO CARE
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 s/p L hemigloss, L ND, R ALT FF, NGT, trach, s/p RTOR on 9/5 flap debridement of devascularized tissue,  here today for HD and trach decannulation. VSS, incisions healing, +trach in place without blood or edema or discharge. Nephrology fellow called to arrange HD, case d/w ENT team, plan for admission to telemetry for HD and decannulation while on pulse oximetry

## 2020-09-19 NOTE — ED PROVIDER NOTE - OBJECTIVE STATEMENT
64M w/ pmhx of ESRD, HTN, DM and T3N1M0 left tongue SCCa now s/p left hemiglossectomy, left modified radical neck dissection, right ALT free flap and tracheotomy c/o here for dialysis. Pt states he was d/c from the hospital yesterday and told to come back today for dialysis. pt notes he was told the outpt center could not take him due to trach. pt feeling well. no fever or chills. no sob or cough. no cp, abd pain, n/v. Pt notes visiting Nurse did not come to clean the trach. no further complaints.

## 2020-09-19 NOTE — H&P ADULT - HISTORY OF PRESENT ILLNESS
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 s/p L hemigloss, L ND, R ALT FF, NGT, trach 8LPC    Readmitted for observation during capping trial of current tracheostomy (4CFS). Patient has been tolerating PMV well. Dialysis today and tuesday. No complaints at this time - stable, breathing well and tolerating bolus feeds through PEG.     Hospital Course:  9/2: weaned off pressors in SICU. soft pressures s/p bolus of albumin and PRBC 1 unit each. otherwise stable. no sedation and no pressors during am rounds. weaned to CPAP 30%. flap doing well with strong signal. s/p 300 rectal ASA. on abx  9/3: NAEON, flap doing well. pressures stable with MAPs >65. hgb 6.7, s/p 1u PRBC > r/p hgb 8. HD tomorrow per renal. PT unable to see today b/c anemia  9/4: NAEON. flap doing well. pressures stable with MAPs >65. HD today per renal. PT today  9/5: NAEON. had flap compromise yesterday late afternoon (no venous signal, flap dusky and no bleeding pinprick). s/p RTOR with flap debridement: left neck incision opened. arterial and venous anastomosis found to be intact with good flow on doppler.  ALT flap trimmed to bleeding tissue and anterior mouth packed with gauze.  left neck incision closed with interrupted nylon suture. overnight, hgb drop 6.9 s/p transfusion > hgb 7.5. venous and arterial signals good in the am.   9/6: NAEON. Bleeding with mild scratching on flap. Otherwise doing well. Pain controlled. Breathing well. Will stepdown today. Cuff deflated yesterday.  9/7: NAEON. Bleeding with mild scratching on flap.Pain controlled. Breathing well. In stepdown today.  9/8: NAEON. Bleeding on mild scratching. Pain controlled and breathing well. PEG today with IR.  9/9: NAEON. Transfused 1PRBC with dialysis yesterday. PEG placement yesterday. Bleeding on mild scratching with strong doppler. L PEBBLES appears frothy and possible fistula tract noted on L anterior floor of mouth. Packed with 1/4in packing for now.  9/10: NAEON. Leg PEBBLES removed yesterday. Post-transfusion CBC with adequate Hg increase. Pt started on continous feeds and tolerated well. Strong doppler at bedside. Mouth was repacked bedside. Breathing well on humidified air.  9/11: NAEON. Breathing well on humidified air with trach collar. Strong doppler bedside. Mouth was repacked. Pt started on bolusf eeds and tolerating well. Will change to 4CFS today.  9/12: NAEON. Breathing well. Packing replaced.   9/13:NAEON. Tmax 100.6, Breathing well. Packing replaced.   9/14: NAEON. Tolerating PMV. Trach noted to be displaced; replaced with 6CFS at bedside.   9/15: NAEON. Able to voice. Some watery stools  9/16: NAEON. diarrhea resolved  9/17: NAEON. 6cfs changed to 4cfs  9/18: NAEON. Ambulating well. Tolerating bolus feeds. Pain controlled. Pt and wife instructed on PEG feeds, trach care, and wound care.  Safe for discharge home with home PT and walker.     PAST MEDICAL & SURGICAL HISTORY:  Hypothyroidism    Lower extremity edema    Hyperlipidemia    BPH (benign prostatic hyperplasia)    Type II diabetes mellitus    Essential hypertension    Chronic kidney disease (CKD), stage V  Hemodialysis    AV fistula  left UE    S/P appendectomy      No Known Allergies    MEDICATIONS  (STANDING):    MEDICATIONS  (PRN):      Objective    ICU Vital Signs Last 24 Hrs  T(C): 36.8 (19 Sep 2020 10:18), Max: 36.9 (19 Sep 2020 09:39)  T(F): 98.2 (19 Sep 2020 10:18), Max: 98.5 (19 Sep 2020 09:39)  HR: 79 (19 Sep 2020 10:18) (79 - 87)  BP: 155/65 (19 Sep 2020 10:18) (138/68 - 173/94)  BP(mean): 112 (18 Sep 2020 11:30) (112 - 112)  RR: 18 (19 Sep 2020 10:18) (16 - 19)  SpO2: 98% (19 Sep 2020 10:18) (98% - 100%)      General: AAOx3  HEENT: Neck incision c/d/i, neck soft and flat. Intraoral flap appears healthy.  Neck: 4CFS in place, minimal fullness of neck  Extrem: WWP, no edema. Incision c/d/i on R thigh  Pulm: Nonlabored breathing, no respiratory distress.                           8.9    11.52 )-----------( 377      ( 19 Sep 2020 09:27 )             27.8     09-19    134<L>  |  89<L>  |  85<H>  ----------------------------<  260<H>  4.9   |  29  |  7.44<H>    Ca    10.1      19 Sep 2020 09:28        I&O's Summary             64M w/ pmhx of ESRD, HTN, DM and T3N1M0 left tongue SCCa now s/p left hemiglossectomy, left modified radical neck dissection, right ALT free flap and tracheotomy. s/p L hemigloss, L ND, R ALT FF, NGT, trach 8LPC.    Patient was discharged with a 4CFS trach with PMV and PEG tube.    Readmitted for observation during capping trial of current tracheostomy (4CFS). Patient has been tolerating PMV well. Dialysis today and tuesday. No complaints at this time - stable, breathing well and tolerating bolus feeds through PEG.     Hospital Course:  9/2: weaned off pressors in SICU. soft pressures s/p bolus of albumin and PRBC 1 unit each. otherwise stable. no sedation and no pressors during am rounds. weaned to CPAP 30%. flap doing well with strong signal. s/p 300 rectal ASA. on abx  9/3: NAEON, flap doing well. pressures stable with MAPs >65. hgb 6.7, s/p 1u PRBC > r/p hgb 8. HD tomorrow per renal. PT unable to see today b/c anemia  9/4: NAEON. flap doing well. pressures stable with MAPs >65. HD today per renal. PT today  9/5: NAEON. had flap compromise yesterday late afternoon (no venous signal, flap dusky and no bleeding pinprick). s/p RTOR with flap debridement: left neck incision opened. arterial and venous anastomosis found to be intact with good flow on doppler.  ALT flap trimmed to bleeding tissue and anterior mouth packed with gauze.  left neck incision closed with interrupted nylon suture. overnight, hgb drop 6.9 s/p transfusion > hgb 7.5. venous and arterial signals good in the am.   9/6: NAEON. Bleeding with mild scratching on flap. Otherwise doing well. Pain controlled. Breathing well. Will stepdown today. Cuff deflated yesterday.  9/7: NAEON. Bleeding with mild scratching on flap.Pain controlled. Breathing well. In stepdown today.  9/8: NAEON. Bleeding on mild scratching. Pain controlled and breathing well. PEG today with IR.  9/9: NAEON. Transfused 1PRBC with dialysis yesterday. PEG placement yesterday. Bleeding on mild scratching with strong doppler. L PEBBLES appears frothy and possible fistula tract noted on L anterior floor of mouth. Packed with 1/4in packing for now.  9/10: NAEON. Leg PEBBLES removed yesterday. Post-transfusion CBC with adequate Hg increase. Pt started on continous feeds and tolerated well. Strong doppler at bedside. Mouth was repacked bedside. Breathing well on humidified air.  9/11: NAEON. Breathing well on humidified air with trach collar. Strong doppler bedside. Mouth was repacked. Pt started on bolusf eeds and tolerating well. Will change to 4CFS today.  9/12: NAEON. Breathing well. Packing replaced.   9/13:NAEON. Tmax 100.6, Breathing well. Packing replaced.   9/14: NAEON. Tolerating PMV. Trach noted to be displaced; replaced with 6CFS at bedside.   9/15: NAEON. Able to voice. Some watery stools  9/16: NAEON. diarrhea resolved  9/17: NAEON. 6cfs changed to 4cfs  9/18: NAEON. Ambulating well. Tolerating bolus feeds. Pain controlled. Pt and wife instructed on PEG feeds, trach care, and wound care.  Safe for discharge home with home PT and walker.     PAST MEDICAL & SURGICAL HISTORY:  Hypothyroidism    Lower extremity edema    Hyperlipidemia    BPH (benign prostatic hyperplasia)    Type II diabetes mellitus    Essential hypertension    Chronic kidney disease (CKD), stage V  Hemodialysis    AV fistula  left UE    S/P appendectomy      No Known Allergies    MEDICATIONS  (STANDING):    MEDICATIONS  (PRN):      Objective    ICU Vital Signs Last 24 Hrs  T(C): 36.8 (19 Sep 2020 10:18), Max: 36.9 (19 Sep 2020 09:39)  T(F): 98.2 (19 Sep 2020 10:18), Max: 98.5 (19 Sep 2020 09:39)  HR: 79 (19 Sep 2020 10:18) (79 - 87)  BP: 155/65 (19 Sep 2020 10:18) (138/68 - 173/94)  BP(mean): 112 (18 Sep 2020 11:30) (112 - 112)  RR: 18 (19 Sep 2020 10:18) (16 - 19)  SpO2: 98% (19 Sep 2020 10:18) (98% - 100%)      General: AAOx3  HEENT: Neck incision c/d/i, neck soft and flat. Intraoral flap appears healthy.  Neck: 4CFS in place, minimal fullness of neck  Extrem: WWP, no edema. Incision c/d/i on R thigh  Pulm: Nonlabored breathing, no respiratory distress.                           8.9    11.52 )-----------( 377      ( 19 Sep 2020 09:27 )             27.8     09-19    134<L>  |  89<L>  |  85<H>  ----------------------------<  260<H>  4.9   |  29  |  7.44<H>    Ca    10.1      19 Sep 2020 09:28        I&O's Summary

## 2020-09-19 NOTE — H&P ADULT - ASSESSMENT
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 s/p L hemigloss, L ND, R ALT FF, NGT.    - Readmit to ENT for capping trial with possible decannulation early this week  - Dialysis today and Tuesday  - Restart home meds  - C/w PEG feeds  - Routine trach care with suctioning  - C/w PMV use for today    Contact ENT if any issues or questions 64M w/ pmhx of ESRD, HTN, DM and T3N1M0 left tongue SCCa now s/p left hemiglossectomy, left modified radical neck dissection, right ALT free flap and tracheotomy. s/p L hemigloss, L ND, R ALT FF.    - Readmit to ENT for capping trial with possible decannulation early this week  - Dialysis today and Tuesday  - Restart home meds  - C/w PEG feeds  - Routine trach care with suctioning  - C/w PMV use for today    Contact ENT if any issues or questions

## 2020-09-19 NOTE — ED ADULT NURSE NOTE - OBJECTIVE STATEMENT
patient alert and oriented x x3 came to have hemodialysis today , patient had recent surgery was d/c home yesterday , was told to home here today for dialysis . Noted with av fistula on left arm with bruitt and thrill noted ,.  Denies any discomfort . NO fever , chills ,chest pain nor sob . Seen and examined by ELTON Vaca , all labs sent , will continue to monitor .

## 2020-09-19 NOTE — ED PROVIDER NOTE - PMH
BPH (benign prostatic hyperplasia)    Chronic kidney disease (CKD), stage V  Hemodialysis  Essential hypertension    Hyperlipidemia    Hypothyroidism    Lower extremity edema    Type II diabetes mellitus

## 2020-09-19 NOTE — CONSULT NOTE ADULT - SUBJECTIVE AND OBJECTIVE BOX
Nephrology consulted for ESRD on HD  HD TTS via LUE AVF  last HD 9/17 at [facility]  denies ha, cp, sob, n/v/d, edema   HPI:  64M w/ pmhx of ESRD, HTN, DM and T3N1M0 left tongue SCCa now s/p left hemiglossectomy, left modified radical neck dissection, right ALT free flap and tracheotomy. s/p L hemigloss, L ND, R ALT FF, NGT, trach 8LPC.  Patient was discharged with a 4CFS trach with PMV and PEG tube.  Readmitted for observation during capping trial of current tracheostomy (4CFS). Patient has been tolerating PMV well.    PAST MEDICAL & SURGICAL HISTORY:  Hypothyroidism    Lower extremity edema    Hyperlipidemia    BPH (benign prostatic hyperplasia)    Type II diabetes mellitus    Essential hypertension    Chronic kidney disease (CKD), stage V  Hemodialysis    AV fistula  left UE    S/P appendectomy      No Known Allergies    MEDICATIONS  (STANDING):    MEDICATIONS  (PRN):      Objective    ICU Vital Signs Last 24 Hrs  T(C): 36.8 (19 Sep 2020 10:18), Max: 36.9 (19 Sep 2020 09:39)  T(F): 98.2 (19 Sep 2020 10:18), Max: 98.5 (19 Sep 2020 09:39)  HR: 79 (19 Sep 2020 10:18) (79 - 87)  BP: 155/65 (19 Sep 2020 10:18) (138/68 - 173/94)  BP(mean): 112 (18 Sep 2020 11:30) (112 - 112)  RR: 18 (19 Sep 2020 10:18) (16 - 19)  SpO2: 98% (19 Sep 2020 10:18) (98% - 100%)      I&O's Summary             (19 Sep 2020 11:24)      PAST MEDICAL & SURGICAL HISTORY:  Hypothyroidism    Lower extremity edema    Hyperlipidemia    BPH (benign prostatic hyperplasia)    Type II diabetes mellitus    Essential hypertension    Chronic kidney disease (CKD), stage V  Hemodialysis    AV fistula  left UE    S/P appendectomy        Allergies    No Known Allergies    Intolerances        FAMILY HISTORY:  not contributory    SOCIAL HISTORY:      MEDICATIONS  (STANDING):  amLODIPine   Tablet 10 milliGRAM(s) Oral daily  aspirin  chewable 81 milliGRAM(s) Oral daily  atorvastatin 40 milliGRAM(s) Oral at bedtime  carvedilol 25 milliGRAM(s) Oral every 12 hours  dextrose 5%. 1000 milliLiter(s) (50 mL/Hr) IV Continuous <Continuous>  dextrose 50% Injectable 12.5 Gram(s) IV Push once  dextrose 50% Injectable 25 Gram(s) IV Push once  dextrose 50% Injectable 25 Gram(s) IV Push once  doxazosin 1 milliGRAM(s) Oral at bedtime  heparin   Injectable 5000 Unit(s) SubCutaneous every 8 hours  insulin glargine Injectable (LANTUS) 12 Unit(s) SubCutaneous at bedtime  insulin lispro (HumaLOG) corrective regimen sliding scale   SubCutaneous Before meals and at bedtime  levothyroxine 75 MICROGram(s) Oral daily    MEDICATIONS  (PRN):  acetaminophen    Suspension .. 650 milliGRAM(s) Oral every 4 hours PRN Mild Pain (1 - 3)  dextrose 40% Gel 15 Gram(s) Oral once PRN Blood Glucose LESS THAN 70 milliGRAM(s)/deciliter  glucagon  Injectable 1 milliGRAM(s) IntraMuscular once PRN Glucose LESS THAN 70 milligrams/deciliter  oxycodone    5 mG/acetaminophen 325 mG 1 Tablet(s) Oral every 6 hours PRN Moderate Pain (4 - 6)  oxycodone    5 mG/acetaminophen 325 mG 2 Tablet(s) Oral every 6 hours PRN Severe Pain (7 - 10)      Vital Signs Last 24 Hrs  T(C): 36.7 (19 Sep 2020 14:35), Max: 36.9 (19 Sep 2020 09:39)  T(F): 98.1 (19 Sep 2020 14:35), Max: 98.5 (19 Sep 2020 09:39)  HR: 82 (19 Sep 2020 14:35) (79 - 87)  BP: 157/77 (19 Sep 2020 14:35) (138/68 - 157/77)  BP(mean): --  RR: 18 (19 Sep 2020 14:35) (16 - 18)  SpO2: 100% (19 Sep 2020 14:35) (98% - 100%)    Review of Systems: No CP, SOB, leg swelling    PHYSICAL EXAM:  GENERAL: alert, no acute distress at present  NECK: No JVD, trach in place, room air  CHEST/LUNG: equal breath sounds bilaterally  HEART: normal S1S2, RRR  EXTREMITIES: No LE edema bilateral  ACCESS: LUE AVF, good thrill    CAPILLARY BLOOD GLUCOSE      POCT Blood Glucose.: 179 mg/dL (19 Sep 2020 12:12)      I&O's Summary        LABS:                            8.9    11.52 )-----------( 377      ( 19 Sep 2020 09:27 )             27.8       09-19    134<L>  |  89<L>  |  85<H>  ----------------------------<  260<H>  4.9   |  29  |  7.44<H>    Ca    10.1      19 Sep 2020 09:28      RADIOLOGY & ADDITIONAL TESTS:    reviewed

## 2020-09-19 NOTE — ED ADULT TRIAGE NOTE - CHIEF COMPLAINT QUOTE
63 y/o male recently discharged from hospital came in to ED for Dialysis, hx tongue CA. Tracheostomy to air.

## 2020-09-20 LAB
ANION GAP SERPL CALC-SCNC: 14 MMOL/L — SIGNIFICANT CHANGE UP (ref 5–17)
BUN SERPL-MCNC: 54 MG/DL — HIGH (ref 7–23)
CALCIUM SERPL-MCNC: 9.4 MG/DL — SIGNIFICANT CHANGE UP (ref 8.4–10.5)
CHLORIDE SERPL-SCNC: 90 MMOL/L — LOW (ref 96–108)
CO2 SERPL-SCNC: 31 MMOL/L — SIGNIFICANT CHANGE UP (ref 22–31)
CREAT SERPL-MCNC: 5.19 MG/DL — HIGH (ref 0.5–1.3)
GLUCOSE BLDC GLUCOMTR-MCNC: 213 MG/DL — HIGH (ref 70–99)
GLUCOSE BLDC GLUCOMTR-MCNC: 216 MG/DL — HIGH (ref 70–99)
GLUCOSE BLDC GLUCOMTR-MCNC: 277 MG/DL — HIGH (ref 70–99)
GLUCOSE BLDC GLUCOMTR-MCNC: 83 MG/DL — SIGNIFICANT CHANGE UP (ref 70–99)
GLUCOSE SERPL-MCNC: 61 MG/DL — LOW (ref 70–99)
HCT VFR BLD CALC: 30.2 % — LOW (ref 39–50)
HGB BLD-MCNC: 10 G/DL — LOW (ref 13–17)
MAGNESIUM SERPL-MCNC: 2.5 MG/DL — SIGNIFICANT CHANGE UP (ref 1.6–2.6)
MCHC RBC-ENTMCNC: 29.4 PG — SIGNIFICANT CHANGE UP (ref 27–34)
MCHC RBC-ENTMCNC: 33.1 GM/DL — SIGNIFICANT CHANGE UP (ref 32–36)
MCV RBC AUTO: 88.8 FL — SIGNIFICANT CHANGE UP (ref 80–100)
NRBC # BLD: 0 /100 WBCS — SIGNIFICANT CHANGE UP (ref 0–0)
PHOSPHATE SERPL-MCNC: 4.5 MG/DL — SIGNIFICANT CHANGE UP (ref 2.5–4.5)
PLATELET # BLD AUTO: 328 K/UL — SIGNIFICANT CHANGE UP (ref 150–400)
POTASSIUM SERPL-MCNC: 4 MMOL/L — SIGNIFICANT CHANGE UP (ref 3.5–5.3)
POTASSIUM SERPL-SCNC: 4 MMOL/L — SIGNIFICANT CHANGE UP (ref 3.5–5.3)
RBC # BLD: 3.4 M/UL — LOW (ref 4.2–5.8)
RBC # FLD: 14.1 % — SIGNIFICANT CHANGE UP (ref 10.3–14.5)
SODIUM SERPL-SCNC: 135 MMOL/L — SIGNIFICANT CHANGE UP (ref 135–145)
WBC # BLD: 11.08 K/UL — HIGH (ref 3.8–10.5)
WBC # FLD AUTO: 11.08 K/UL — HIGH (ref 3.8–10.5)

## 2020-09-20 PROCEDURE — 99232 SBSQ HOSP IP/OBS MODERATE 35: CPT

## 2020-09-20 RX ADMIN — Medication 4: at 21:53

## 2020-09-20 RX ADMIN — Medication 6: at 11:47

## 2020-09-20 RX ADMIN — Medication 75 MICROGRAM(S): at 05:59

## 2020-09-20 RX ADMIN — CARVEDILOL PHOSPHATE 25 MILLIGRAM(S): 80 CAPSULE, EXTENDED RELEASE ORAL at 05:59

## 2020-09-20 RX ADMIN — Medication 81 MILLIGRAM(S): at 11:47

## 2020-09-20 RX ADMIN — OXYCODONE AND ACETAMINOPHEN 1 TABLET(S): 5; 325 TABLET ORAL at 07:24

## 2020-09-20 RX ADMIN — AMLODIPINE BESYLATE 10 MILLIGRAM(S): 2.5 TABLET ORAL at 05:59

## 2020-09-20 RX ADMIN — OXYCODONE AND ACETAMINOPHEN 1 TABLET(S): 5; 325 TABLET ORAL at 00:24

## 2020-09-20 RX ADMIN — OXYCODONE AND ACETAMINOPHEN 2 TABLET(S): 5; 325 TABLET ORAL at 22:10

## 2020-09-20 RX ADMIN — HEPARIN SODIUM 5000 UNIT(S): 5000 INJECTION INTRAVENOUS; SUBCUTANEOUS at 14:57

## 2020-09-20 RX ADMIN — ATORVASTATIN CALCIUM 40 MILLIGRAM(S): 80 TABLET, FILM COATED ORAL at 21:55

## 2020-09-20 RX ADMIN — HEPARIN SODIUM 5000 UNIT(S): 5000 INJECTION INTRAVENOUS; SUBCUTANEOUS at 05:59

## 2020-09-20 RX ADMIN — HEPARIN SODIUM 5000 UNIT(S): 5000 INJECTION INTRAVENOUS; SUBCUTANEOUS at 21:55

## 2020-09-20 RX ADMIN — Medication 4: at 17:26

## 2020-09-20 RX ADMIN — CARVEDILOL PHOSPHATE 25 MILLIGRAM(S): 80 CAPSULE, EXTENDED RELEASE ORAL at 17:26

## 2020-09-20 RX ADMIN — Medication 1 MILLIGRAM(S): at 21:53

## 2020-09-20 RX ADMIN — INSULIN GLARGINE 12 UNIT(S): 100 INJECTION, SOLUTION SUBCUTANEOUS at 21:55

## 2020-09-20 NOTE — PROGRESS NOTE ADULT - ASSESSMENT
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 s/p L hemigloss, L ND, R ALT FF. Readmitted for observation during capping trial of current tracheostomy.  Nephrology consulted for inpatient dialysis arrangements.      # ESRD on HD TTS via LUE AVF   - s/p HD 9/19 as per schedule, well tolerated UF 2.1 L  - acceptable volume and electrolytes  - stable ESRD, plan for HD 9/22 as per schedule   - daily weights  - renal diet  - strict I/O     # HTN  - UF with HD    # Renal bone disease  - phos 4.5  - no need for binders    # Anemia  - Hb 10.0 g/dl  - hx hemotransfusions  - no DANIEL in setting of malignancy

## 2020-09-20 NOTE — PHYSICAL THERAPY INITIAL EVALUATION ADULT - PLANNED THERAPY INTERVENTIONS, PT EVAL
neuromuscular re-education/postural re-education/ROM/stretching/gait training/strengthening/balance training/transfer training/bed mobility training/manual therapy techniques

## 2020-09-20 NOTE — PROGRESS NOTE ADULT - ASSESSMENT
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 s/p L hemigloss, L ND, R ALT FF.    - Capping trial today  - Dialysis today and Tuesday  - Restart home meds  - C/w PEG feeds  - Routine trach care with suctioning  - C/w PMV use for today    Contact ENT if any issues or questions

## 2020-09-20 NOTE — PHYSICAL THERAPY INITIAL EVALUATION ADULT - GENERAL OBSERVATIONS, REHAB EVAL
Pt received supine with HOB elevated in NAD all lines in tact, call bell in reach, cleared for PT Evaluation by RN

## 2020-09-20 NOTE — PHYSICAL THERAPY INITIAL EVALUATION ADULT - PERTINENT HX OF CURRENT PROBLEM, REHAB EVAL
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 s/p L hemigloss, L ND, R ALT FF.

## 2020-09-20 NOTE — PROGRESS NOTE ADULT - SUBJECTIVE AND OBJECTIVE BOX
no acute events overnight  stable ESRD   s/p HD 9/19 as per schedule, well tolerated UF 2.1 L   s/p trach capped, breathing on RA, sat 97%  not in any distress, denies any active complains at present        Meds:  acetaminophen    Suspension .. 650 every 4 hours PRN  amLODIPine   Tablet 10 daily  aspirin  chewable 81 daily  atorvastatin 40 at bedtime  carvedilol 25 every 12 hours  dextrose 40% Gel 15 once PRN  dextrose 5%. 1000 <Continuous>  dextrose 50% Injectable 12.5 once  dextrose 50% Injectable 25 once  dextrose 50% Injectable 25 once  doxazosin 1 at bedtime  glucagon  Injectable 1 once PRN  heparin   Injectable 5000 every 8 hours  insulin glargine Injectable (LANTUS) 12 at bedtime  insulin lispro (HumaLOG) corrective regimen sliding scale  Before meals and at bedtime  levothyroxine 75 daily  oxycodone    5 mG/acetaminophen 325 mG 1 every 6 hours PRN  oxycodone    5 mG/acetaminophen 325 mG 2 every 6 hours PRN      T(C): , Max: 36.8 (09-19-20 @ 20:51)  T(F): , Max: 98.2 (09-19-20 @ 20:51)  HR: 82 (09-20-20 @ 11:30)  BP: 154/69 (09-20-20 @ 11:30)  BP(mean): 99 (09-20-20 @ 11:30)  RR: 18 (09-20-20 @ 11:30)  SpO2: 99% (09-20-20 @ 11:30)  Wt(kg): --    09-19 @ 07:01  -  09-20 @ 07:00  --------------------------------------------------------  IN: 1468 mL / OUT: 2500 mL / NET: -1032 mL    09-20 @ 07:01  -  09-20 @ 15:44  --------------------------------------------------------  IN: 474 mL / OUT: 0 mL / NET: 474 mL        PHYSICAL EXAM:  GENERAL: alert, no acute distress at present  NECK: No JVD, trach in place, room air  CHEST/LUNG: equal breath sounds bilaterally  HEART: normal S1S2, RRR  EXTREMITIES: No LE edema bilateral  ACCESS: LUE AVF, good thrill, bruit        LABS:                        10.0   11.08 )-----------( 328      ( 20 Sep 2020 06:36 )             30.2     09-20    135  |  90<L>  |  54<H>  ----------------------------<  61<L>  4.0   |  31  |  5.19<H>    Ca    9.4      20 Sep 2020 06:36  Phos  4.5     09-20  Mg     2.5     09-20        RADIOLOGY & ADDITIONAL STUDIES:    reviewed

## 2020-09-20 NOTE — PHYSICAL THERAPY INITIAL EVALUATION ADULT - CRITERIA FOR SKILLED THERAPEUTIC INTERVENTIONS
risk reduction/prevention/predicted duration of therapy intervention/functional limitations in following categories/rehab potential/impairments found/therapy frequency/anticipated equipment needs at discharge/anticipated discharge recommendation

## 2020-09-20 NOTE — PHYSICAL THERAPY INITIAL EVALUATION ADULT - IMPAIRMENTS FOUND, PT EVAL
gross motor/posture/aerobic capacity/endurance/fine motor/integumentary integrity/joint integrity and mobility/gait, locomotion, and balance/poor safety awareness/neuromotor development and sensory integration/ROM/ergonomics and body mechanics/muscle strength

## 2020-09-20 NOTE — PROGRESS NOTE ADULT - SUBJECTIVE AND OBJECTIVE BOX
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 s/p L hemigloss, L ND, R ALT FF, NGT, trach 8LPC.    Patient was discharged with a 4CFS trach with PMV and PEG tube.    Readmitted for observation during capping trial of current tracheostomy (4CFS).   Hospital Course:  9/2: weaned off pressors in SICU. soft pressures s/p bolus of albumin and PRBC 1 unit each. otherwise stable. no sedation and no pressors during am rounds. weaned to CPAP 30%. flap doing well with strong signal. s/p 300 rectal ASA. on abx  9/3: NAEON, flap doing well. pressures stable with MAPs >65. hgb 6.7, s/p 1u PRBC > r/p hgb 8. HD tomorrow per renal. PT unable to see today b/c anemia  9/4: NAEON. flap doing well. pressures stable with MAPs >65. HD today per renal. PT today  9/5: NAEON. had flap compromise yesterday late afternoon (no venous signal, flap dusky and no bleeding pinprick). s/p RTOR with flap debridement: left neck incision opened. arterial and venous anastomosis found to be intact with good flow on doppler.  ALT flap trimmed to bleeding tissue and anterior mouth packed with gauze.  left neck incision closed with interrupted nylon suture. overnight, hgb drop 6.9 s/p transfusion > hgb 7.5. venous and arterial signals good in the am.   9/6: NAEON. Bleeding with mild scratching on flap. Otherwise doing well. Pain controlled. Breathing well. Will stepdown today. Cuff deflated yesterday.  9/7: NAEON. Bleeding with mild scratching on flap.Pain controlled. Breathing well. In stepdown today.  9/8: NAEON. Bleeding on mild scratching. Pain controlled and breathing well. PEG today with IR.  9/9: NAEON. Transfused 1PRBC with dialysis yesterday. PEG placement yesterday. Bleeding on mild scratching with strong doppler. L PEBBLES appears frothy and possible fistula tract noted on L anterior floor of mouth. Packed with 1/4in packing for now.  9/10: NAEON. Leg PEBBLES removed yesterday. Post-transfusion CBC with adequate Hg increase. Pt started on continous feeds and tolerated well. Strong doppler at bedside. Mouth was repacked bedside. Breathing well on humidified air.  9/11: NAEON. Breathing well on humidified air with trach collar. Strong doppler bedside. Mouth was repacked. Pt started on bolusf eeds and tolerating well. Will change to 4CFS today.  9/12: NAEON. Breathing well. Packing replaced.   9/13:NAEON. Tmax 100.6, Breathing well. Packing replaced.   9/14: NAEON. Tolerating PMV. Trach noted to be displaced; replaced with 6CFS at bedside.   9/15: NAEON. Able to voice. Some watery stools  9/16: NAEON. diarrhea resolved  9/17: NAEON. 6cfs changed to 4cfs  9/18: NAEON. Ambulating well. Tolerating bolus feeds. Pain controlled. Pt and wife instructed on PEG feeds, trach care, and wound care.  Safe for discharge home with home PT and walker.   ***READMITTED 9/19)  9/19: Patient has been tolerating PMV well. Dialysis today and tuesday. No complaints at this time - stable, breathing well and tolerating bolus feeds through PEG.  9/20: NAEON. Hypothermic overnight so given warming blankets. Will have capping trial today. Otherwise no complaints.    PAST MEDICAL & SURGICAL HISTORY:  Hypothyroidism    Lower extremity edema    Hyperlipidemia    BPH (benign prostatic hyperplasia)    Type II diabetes mellitus    Essential hypertension    Chronic kidney disease (CKD), stage V  Hemodialysis    AV fistula  left UE    S/P appendectomy      No Known Allergies    MEDICATIONS  (STANDING):  amLODIPine   Tablet 10 milliGRAM(s) Oral daily  aspirin  chewable 81 milliGRAM(s) Oral daily  atorvastatin 40 milliGRAM(s) Oral at bedtime  carvedilol 25 milliGRAM(s) Oral every 12 hours  dextrose 5%. 1000 milliLiter(s) (50 mL/Hr) IV Continuous <Continuous>  dextrose 50% Injectable 12.5 Gram(s) IV Push once  dextrose 50% Injectable 25 Gram(s) IV Push once  dextrose 50% Injectable 25 Gram(s) IV Push once  doxazosin 1 milliGRAM(s) Oral at bedtime  heparin   Injectable 5000 Unit(s) SubCutaneous every 8 hours  insulin glargine Injectable (LANTUS) 12 Unit(s) SubCutaneous at bedtime  insulin lispro (HumaLOG) corrective regimen sliding scale   SubCutaneous Before meals and at bedtime  levothyroxine 75 MICROGram(s) Oral daily    MEDICATIONS  (PRN):  acetaminophen    Suspension .. 650 milliGRAM(s) Oral every 4 hours PRN Mild Pain (1 - 3)  dextrose 40% Gel 15 Gram(s) Oral once PRN Blood Glucose LESS THAN 70 milliGRAM(s)/deciliter  glucagon  Injectable 1 milliGRAM(s) IntraMuscular once PRN Glucose LESS THAN 70 milligrams/deciliter  oxycodone    5 mG/acetaminophen 325 mG 1 Tablet(s) Oral every 6 hours PRN Moderate Pain (4 - 6)  oxycodone    5 mG/acetaminophen 325 mG 2 Tablet(s) Oral every 6 hours PRN Severe Pain (7 - 10)      Objective    ICU Vital Signs Last 24 Hrs  T(C): 35.9 (20 Sep 2020 08:10), Max: 36.9 (19 Sep 2020 09:39)  T(F): 96.7 (20 Sep 2020 08:10), Max: 98.5 (19 Sep 2020 09:39)  HR: 74 (20 Sep 2020 08:10) (58 - 90)  BP: 118/59 (20 Sep 2020 08:10) (118/59 - 188/88)  BP(mean): 81 (20 Sep 2020 08:10) (81 - 127)  RR: 11 (20 Sep 2020 08:10) (11 - 21)  SpO2: 96% (20 Sep 2020 08:10) (96% - 100%)      General: AAOx3  HEENT: Neck incision c/d/i, neck soft and flat. Intraoral flap appears healthy.  Neck: 4CFS in place, minimal fullness of neck  Extrem: WWP, no edema. Incision c/d/i on R thigh  Pulm: Nonlabored breathing, no respiratory distress.                           10.0   11.08 )-----------( 328      ( 20 Sep 2020 06:36 )             30.2     09-20    135  |  90<L>  |  54<H>  ----------------------------<  61<L>  4.0   |  31  |  5.19<H>    Ca    9.4      20 Sep 2020 06:36  Phos  4.5     09-20  Mg     2.5     09-20        I&O's Summary    19 Sep 2020 07:01  -  20 Sep 2020 07:00  --------------------------------------------------------  IN: 1468 mL / OUT: 2500 mL / NET: -1032 mL

## 2020-09-21 ENCOUNTER — TRANSCRIPTION ENCOUNTER (OUTPATIENT)
Age: 65
End: 2020-09-21

## 2020-09-21 VITALS — TEMPERATURE: 98 F

## 2020-09-21 LAB
ANION GAP SERPL CALC-SCNC: 17 MMOL/L — SIGNIFICANT CHANGE UP (ref 5–17)
BUN SERPL-MCNC: 86 MG/DL — HIGH (ref 7–23)
CALCIUM SERPL-MCNC: 9.3 MG/DL — SIGNIFICANT CHANGE UP (ref 8.4–10.5)
CHLORIDE SERPL-SCNC: 87 MMOL/L — LOW (ref 96–108)
CO2 SERPL-SCNC: 29 MMOL/L — SIGNIFICANT CHANGE UP (ref 22–31)
CREAT SERPL-MCNC: 7.09 MG/DL — HIGH (ref 0.5–1.3)
GLUCOSE BLDC GLUCOMTR-MCNC: 195 MG/DL — HIGH (ref 70–99)
GLUCOSE BLDC GLUCOMTR-MCNC: 221 MG/DL — HIGH (ref 70–99)
GLUCOSE SERPL-MCNC: 230 MG/DL — HIGH (ref 70–99)
HCT VFR BLD CALC: 25.8 % — LOW (ref 39–50)
HGB BLD-MCNC: 8.5 G/DL — LOW (ref 13–17)
MAGNESIUM SERPL-MCNC: 2.6 MG/DL — SIGNIFICANT CHANGE UP (ref 1.6–2.6)
MCHC RBC-ENTMCNC: 29.3 PG — SIGNIFICANT CHANGE UP (ref 27–34)
MCHC RBC-ENTMCNC: 32.9 GM/DL — SIGNIFICANT CHANGE UP (ref 32–36)
MCV RBC AUTO: 89 FL — SIGNIFICANT CHANGE UP (ref 80–100)
NRBC # BLD: 0 /100 WBCS — SIGNIFICANT CHANGE UP (ref 0–0)
PHOSPHATE SERPL-MCNC: 4.5 MG/DL — SIGNIFICANT CHANGE UP (ref 2.5–4.5)
PLATELET # BLD AUTO: 335 K/UL — SIGNIFICANT CHANGE UP (ref 150–400)
POTASSIUM SERPL-MCNC: 3.8 MMOL/L — SIGNIFICANT CHANGE UP (ref 3.5–5.3)
POTASSIUM SERPL-SCNC: 3.8 MMOL/L — SIGNIFICANT CHANGE UP (ref 3.5–5.3)
RBC # BLD: 2.9 M/UL — LOW (ref 4.2–5.8)
RBC # FLD: 14.2 % — SIGNIFICANT CHANGE UP (ref 10.3–14.5)
SARS-COV-2 IGG SERPL QL IA: NEGATIVE — SIGNIFICANT CHANGE UP
SARS-COV-2 IGM SERPL IA-ACNC: <0.2 RATIO — SIGNIFICANT CHANGE UP
SODIUM SERPL-SCNC: 133 MMOL/L — LOW (ref 135–145)
WBC # BLD: 8.63 K/UL — SIGNIFICANT CHANGE UP (ref 3.8–10.5)
WBC # FLD AUTO: 8.63 K/UL — SIGNIFICANT CHANGE UP (ref 3.8–10.5)

## 2020-09-21 PROCEDURE — 83735 ASSAY OF MAGNESIUM: CPT

## 2020-09-21 PROCEDURE — 80048 BASIC METABOLIC PNL TOTAL CA: CPT

## 2020-09-21 PROCEDURE — 99285 EMERGENCY DEPT VISIT HI MDM: CPT

## 2020-09-21 PROCEDURE — 86769 SARS-COV-2 COVID-19 ANTIBODY: CPT

## 2020-09-21 PROCEDURE — 82962 GLUCOSE BLOOD TEST: CPT

## 2020-09-21 PROCEDURE — 90935 HEMODIALYSIS ONE EVALUATION: CPT

## 2020-09-21 PROCEDURE — 85027 COMPLETE CBC AUTOMATED: CPT

## 2020-09-21 PROCEDURE — U0003: CPT

## 2020-09-21 PROCEDURE — 85025 COMPLETE CBC W/AUTO DIFF WBC: CPT

## 2020-09-21 PROCEDURE — 84100 ASSAY OF PHOSPHORUS: CPT

## 2020-09-21 PROCEDURE — 36415 COLL VENOUS BLD VENIPUNCTURE: CPT

## 2020-09-21 RX ORDER — ACETAMINOPHEN 500 MG
20.31 TABLET ORAL
Qty: 0 | Refills: 0 | DISCHARGE
Start: 2020-09-21

## 2020-09-21 RX ADMIN — CARVEDILOL PHOSPHATE 25 MILLIGRAM(S): 80 CAPSULE, EXTENDED RELEASE ORAL at 06:06

## 2020-09-21 RX ADMIN — HEPARIN SODIUM 5000 UNIT(S): 5000 INJECTION INTRAVENOUS; SUBCUTANEOUS at 06:03

## 2020-09-21 RX ADMIN — Medication 75 MICROGRAM(S): at 06:06

## 2020-09-21 RX ADMIN — Medication 81 MILLIGRAM(S): at 11:42

## 2020-09-21 RX ADMIN — AMLODIPINE BESYLATE 10 MILLIGRAM(S): 2.5 TABLET ORAL at 06:06

## 2020-09-21 RX ADMIN — Medication 4: at 07:32

## 2020-09-21 RX ADMIN — Medication 2: at 11:42

## 2020-09-21 RX ADMIN — OXYCODONE AND ACETAMINOPHEN 2 TABLET(S): 5; 325 TABLET ORAL at 01:33

## 2020-09-21 NOTE — DISCHARGE NOTE NURSING/CASE MANAGEMENT/SOCIAL WORK - PATIENT PORTAL LINK FT
You can access the FollowMyHealth Patient Portal offered by [facility] by registering at the following website: [email or web address]. By joining [employer]’s FollowMyHealth portal, you will also be able to view your health information using other applications (apps) compatible with our system.

## 2020-09-21 NOTE — PROGRESS NOTE ADULT - ASSESSMENT
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 s/p L hemigloss, L ND, R ALT FF.    - Decannulated today; Discharge later today pending outpt dialysis transportation confirmation  - Dialysis today and Tuesday  - Restart home meds  - C/w PEG feeds  - Routine trach care with suctioning    Contact ENT if any issues or questions

## 2020-09-21 NOTE — DISCHARGE NOTE PROVIDER - CARE PROVIDER_API CALL
Owen Abad; DDS)  OralMaxillofacial Surgery  [address], [address]  [place] [address]  Phone: [phone]  Fax: [phone]  Follow Up Time:     Ez Marie  OTOLARYNGOLOGY  [address] [place] [address]  Phone: [phone]  Fax: [phone]  Follow Up Time:

## 2020-09-21 NOTE — DISCHARGE NOTE PROVIDER - HOSPITAL COURSE
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 s/p L hemigloss, L ND, R ALT FF, NGT, trach 8LPC.    Patient was discharged with a 4CFS trach with PMV and PEG tube.    Readmitted for observation during capping trial of current tracheostomy (4CFS).   Hospital Course:  9/2: weaned off pressors in SICU. soft pressures s/p bolus of albumin and PRBC 1 unit each. otherwise stable. no sedation and no pressors during am rounds. weaned to CPAP 30%. flap doing well with strong signal. s/p 300 rectal ASA. on abx  9/3: NAEON, flap doing well. pressures stable with MAPs >65. hgb 6.7, s/p 1u PRBC > r/p hgb 8. HD tomorrow per renal. PT unable to see today b/c anemia  9/4: NAEON. flap doing well. pressures stable with MAPs >65. HD today per renal. PT today  9/5: NAEON. had flap compromise yesterday late afternoon (no venous signal, flap dusky and no bleeding pinprick). s/p RTOR with flap debridement: left neck incision opened. arterial and venous anastomosis found to be intact with good flow on doppler.  ALT flap trimmed to bleeding tissue and anterior mouth packed with gauze.  left neck incision closed with interrupted nylon suture. overnight, hgb drop 6.9 s/p transfusion > hgb 7.5. venous and arterial signals good in the am.   9/6: NAEON. Bleeding with mild scratching on flap. Otherwise doing well. Pain controlled. Breathing well. Will stepdown today. Cuff deflated yesterday.  9/7: NAEON. Bleeding with mild scratching on flap.Pain controlled. Breathing well. In stepdown today.  9/8: NAEON. Bleeding on mild scratching. Pain controlled and breathing well. PEG today with IR.  9/9: NAEON. Transfused 1PRBC with dialysis yesterday. PEG placement yesterday. Bleeding on mild scratching with strong doppler. L PEBBLES appears frothy and possible fistula tract noted on L anterior floor of mouth. Packed with 1/4in packing for now.  9/10: NAEON. Leg PEBBLES removed yesterday. Post-transfusion CBC with adequate Hg increase. Pt started on continous feeds and tolerated well. Strong doppler at bedside. Mouth was repacked bedside. Breathing well on humidified air.  9/11: NAEON. Breathing well on humidified air with trach collar. Strong doppler bedside. Mouth was repacked. Pt started on bolusf eeds and tolerating well. Will change to 4CFS today.  9/12: NAEON. Breathing well. Packing replaced.   9/13:NAEON. Tmax 100.6, Breathing well. Packing replaced.   9/14: NAEON. Tolerating PMV. Trach noted to be displaced; replaced with 6CFS at bedside.   9/15: NAEON. Able to voice. Some watery stools  9/16: NAEON. diarrhea resolved  9/17: NAEON. 6cfs changed to 4cfs  9/18: NAEON. Ambulating well. Tolerating bolus feeds. Pain controlled. Pt and wife instructed on PEG feeds, trach care, and wound care.  Safe for discharge home with home PT and walker.   ***READMITTED 9/19)  9/19: Patient has been tolerating PMV well. Dialysis today and tuesday. No complaints at this time - stable, breathing well and tolerating bolus feeds through PEG.  9/20: NAEON. Hypothermic overnight so given warming blankets. Will have capping trial today. Otherwise no complaints.  9/21: NAEON. Tolerated cap well overnight - decannulated this morning. Breathing well and stable.

## 2020-09-21 NOTE — DISCHARGE NOTE PROVIDER - NSDCMRMEDTOKEN_GEN_ALL_CORE_FT
acetaminophen 160 mg/5 mL oral suspension: 20.31 milliliter(s) orally every 4 hours, As needed, Mild Pain (1 - 3)  aspirin 81 mg oral tablet, chewable: 1 tab(s) orally once a day  Coreg 25 mg oral tablet: 1 tab(s) orally 2 times a day  Flomax 0.4 mg oral capsule: 1 cap(s) orally once a day  Glucotrol XL 5 mg oral tablet, extended release: 1 tab(s) orally 2 times a day  hydrALAZINE 100 mg oral tablet: 1 tab(s) orally 3 times a day  Januvia 50 mg oral tablet: 1 tab(s) orally once a day  Lasix 40 mg oral tablet: 1 tab(s) orally once a day  Lipitor 40 mg oral tablet: 1 tab(s) orally once a day (at bedtime)  Neporth: Neprorth, 6 cans per day, via PEG tube (two cans, three times a day).    Please hold tube feeds 1 hour before and 1 hour after PO synthyroid  Norvasc 10 mg oral tablet: 1 tab(s) orally once a day  Synthroid 75 mcg (0.075 mg) oral tablet: 1 tab(s) orally once a day  Tracheostomy supplies: Humidification device    Trach tubes (6CFS)  Suction supplies (14Fr red rubber cath, suction)  Trach collars  Spare inner cannulas  Extra PMVs

## 2020-09-21 NOTE — PROGRESS NOTE ADULT - SUBJECTIVE AND OBJECTIVE BOX
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 s/p L hemigloss, L ND, R ALT FF, NGT, trach 8LPC.    Patient was discharged with a 4CFS trach with PMV and PEG tube.    Readmitted for observation during capping trial of current tracheostomy (4CFS).   Hospital Course:  9/2: weaned off pressors in SICU. soft pressures s/p bolus of albumin and PRBC 1 unit each. otherwise stable. no sedation and no pressors during am rounds. weaned to CPAP 30%. flap doing well with strong signal. s/p 300 rectal ASA. on abx  9/3: NAEON, flap doing well. pressures stable with MAPs >65. hgb 6.7, s/p 1u PRBC > r/p hgb 8. HD tomorrow per renal. PT unable to see today b/c anemia  9/4: NAEON. flap doing well. pressures stable with MAPs >65. HD today per renal. PT today  9/5: NAEON. had flap compromise yesterday late afternoon (no venous signal, flap dusky and no bleeding pinprick). s/p RTOR with flap debridement: left neck incision opened. arterial and venous anastomosis found to be intact with good flow on doppler.  ALT flap trimmed to bleeding tissue and anterior mouth packed with gauze.  left neck incision closed with interrupted nylon suture. overnight, hgb drop 6.9 s/p transfusion > hgb 7.5. venous and arterial signals good in the am.   9/6: NAEON. Bleeding with mild scratching on flap. Otherwise doing well. Pain controlled. Breathing well. Will stepdown today. Cuff deflated yesterday.  9/7: NAEON. Bleeding with mild scratching on flap.Pain controlled. Breathing well. In stepdown today.  9/8: NAEON. Bleeding on mild scratching. Pain controlled and breathing well. PEG today with IR.  9/9: NAEON. Transfused 1PRBC with dialysis yesterday. PEG placement yesterday. Bleeding on mild scratching with strong doppler. L PEBBLES appears frothy and possible fistula tract noted on L anterior floor of mouth. Packed with 1/4in packing for now.  9/10: NAEON. Leg PEBBLES removed yesterday. Post-transfusion CBC with adequate Hg increase. Pt started on continous feeds and tolerated well. Strong doppler at bedside. Mouth was repacked bedside. Breathing well on humidified air.  9/11: NAEON. Breathing well on humidified air with trach collar. Strong doppler bedside. Mouth was repacked. Pt started on bolusf eeds and tolerating well. Will change to 4CFS today.  9/12: NAEON. Breathing well. Packing replaced.   9/13:NAEON. Tmax 100.6, Breathing well. Packing replaced.   9/14: NAEON. Tolerating PMV. Trach noted to be displaced; replaced with 6CFS at bedside.   9/15: NAEON. Able to voice. Some watery stools  9/16: NAEON. diarrhea resolved  9/17: NAEON. 6cfs changed to 4cfs  9/18: NAEON. Ambulating well. Tolerating bolus feeds. Pain controlled. Pt and wife instructed on PEG feeds, trach care, and wound care.  Safe for discharge home with home PT and walker.   ***READMITTED 9/19)  9/19: Patient has been tolerating PMV well. Dialysis today and tuesday. No complaints at this time - stable, breathing well and tolerating bolus feeds through PEG.  9/20: NAEON. Hypothermic overnight so given warming blankets. Will have capping trial today. Otherwise no complaints.  9/21: NAEON. Tolerated cap well overnight - decannulated this morning. Breathing well and stable.    PAST MEDICAL & SURGICAL HISTORY:  Hypothyroidism    Lower extremity edema    Hyperlipidemia    BPH (benign prostatic hyperplasia)    Type II diabetes mellitus    Essential hypertension    Chronic kidney disease (CKD), stage V  Hemodialysis    AV fistula  left UE    S/P appendectomy      No Known Allergies    MEDICATIONS  (STANDING):  amLODIPine   Tablet 10 milliGRAM(s) Oral daily  aspirin  chewable 81 milliGRAM(s) Oral daily  atorvastatin 40 milliGRAM(s) Oral at bedtime  carvedilol 25 milliGRAM(s) Oral every 12 hours  dextrose 5%. 1000 milliLiter(s) (50 mL/Hr) IV Continuous <Continuous>  dextrose 50% Injectable 12.5 Gram(s) IV Push once  dextrose 50% Injectable 25 Gram(s) IV Push once  dextrose 50% Injectable 25 Gram(s) IV Push once  doxazosin 1 milliGRAM(s) Oral at bedtime  heparin   Injectable 5000 Unit(s) SubCutaneous every 8 hours  insulin glargine Injectable (LANTUS) 12 Unit(s) SubCutaneous at bedtime  insulin lispro (HumaLOG) corrective regimen sliding scale   SubCutaneous Before meals and at bedtime  levothyroxine 75 MICROGram(s) Oral daily    MEDICATIONS  (PRN):  acetaminophen    Suspension .. 650 milliGRAM(s) Oral every 4 hours PRN Mild Pain (1 - 3)  dextrose 40% Gel 15 Gram(s) Oral once PRN Blood Glucose LESS THAN 70 milliGRAM(s)/deciliter  glucagon  Injectable 1 milliGRAM(s) IntraMuscular once PRN Glucose LESS THAN 70 milligrams/deciliter  oxycodone    5 mG/acetaminophen 325 mG 1 Tablet(s) Oral every 6 hours PRN Moderate Pain (4 - 6)  oxycodone    5 mG/acetaminophen 325 mG 2 Tablet(s) Oral every 6 hours PRN Severe Pain (7 - 10)      Objective    ICU Vital Signs Last 24 Hrs  T(C): 36.7 (21 Sep 2020 05:30), Max: 37.6 (20 Sep 2020 20:30)  T(F): 98 (21 Sep 2020 05:30), Max: 99.6 (20 Sep 2020 20:30)  HR: 88 (21 Sep 2020 03:53) (68 - 98)  BP: 158/70 (21 Sep 2020 03:53) (118/59 - 179/81)  BP(mean): 100 (21 Sep 2020 03:53) (81 - 117)  RR: 16 (21 Sep 2020 03:53) (11 - 20)  SpO2: 99% (21 Sep 2020 03:53) (96% - 100%)        General: AAOx3  HEENT: Neck incision c/d/i, neck soft and flat. Intraoral flap appears healthy.  Neck: C/d/i tracheal stoma with dressing applied on top.  Extrem: WWP, no edema. Incision c/d/i on R thigh  Pulm: Nonlabored breathing, no respiratory distress.                                      8.5    8.63  )-----------( 335      ( 21 Sep 2020 06:46 )             25.8

## 2020-09-28 ENCOUNTER — APPOINTMENT (OUTPATIENT)
Dept: OTOLARYNGOLOGY | Facility: CLINIC | Age: 65
End: 2020-09-28
Payer: MEDICARE

## 2020-09-28 VITALS
TEMPERATURE: 98.1 F | OXYGEN SATURATION: 98 % | HEART RATE: 97 BPM | WEIGHT: 145 LBS | DIASTOLIC BLOOD PRESSURE: 70 MMHG | SYSTOLIC BLOOD PRESSURE: 146 MMHG | BODY MASS INDEX: 20.76 KG/M2 | HEIGHT: 70 IN

## 2020-09-28 PROCEDURE — 99024 POSTOP FOLLOW-UP VISIT: CPT

## 2020-09-29 NOTE — REASON FOR VISIT
[Post-Operative Visit] : a post-operative visit [Spouse] : spouse [FreeTextEntry2] : s/p 9/1/2020 left extended hemiglossectomy, left neck dissection, ALT flap

## 2020-09-29 NOTE — DISCUSSION/SUMMARY
[Cancer Type / Location / Histology Subtype: ________] : Cancer Type / Location / Histology Subtype: [unfilled] [Diagnosis Date (year): ____] : Diagnosis Date (year): [unfilled] [Surgery] : Surgery: Yes [Surgery Date(s) (year): ____] : Surgery Date(s) (year): [unfilled] [Surgical Procedure / Location / Findings: _________] : Surgical Procedure / Location / Findings: [unfilled] [Need for onging (adjuvant) treatment for cancer?] : Need for onging (adjuvant) treatment for cancer? Yes [Follow up with Radiation MD in _____] : Follow up with Radiation MD in [unfilled] [Emotional and mental health] : Emotional and mental health [Physical Functioning] : Physical functioning [Fatigue] : Fatigue [Radiation] : Radiation: No [Systemic Therapy (chemotherapy, hormonal therapy, other)] : Systemic Therapy (chemotherapy, hormonal therapy, other): No [Genetic Counseling] : Genetic Counseling: No [Follow up with Surgeon in _____] : Follow up with Surgeon in [unfilled] [FreeTextEntry2] : post-operative radiation therapy. Refer to Doris Mcallister. [de-identified] : Follow-up with Dr. Abad\par Referral for physical therapy\par Follow with SLP during RT [FreeTextEntry7] : [email or web address] [FreeTextEntry8] : Ana Lilia Agee, NP [FreeTextEntry9] : 9/28/2020

## 2020-09-29 NOTE — HISTORY OF PRESENT ILLNESS
[de-identified] : 64M with [id number] left oral tongue SCC s/p 9/1/2020 left extended hemiglossectomy, resection left anterior floor of mouth, left neck dissection, and ALT flap (Jenniffer). Temporary trach and PEG were placed.  [facility] would not accept patients with tracheostomies, so he was readmitted 9/19-9/21 for decannulation and hemodialysis.  Tumor board consensus for post-op RT including contralateral neck.\par \par Surgical path: infiltrating moderately differentiated, keratinizing SCC, 3.5 cm, which extends into underlying skeletal muscle, DOI 2.7 cm, negative margins, 0/80 LNs, no LVI, no PNI.\par \par He is tolerating 6 cans Neprorth/day via PEG. Reports weight gain since surgery. Denies pain, fever, chills, dyspnea. Saw Dr. Abad this morning and will f/u in 2 months.

## 2020-09-29 NOTE — ASSESSMENT
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 Referred to [facility].\par - Recommend keeping PEG during RT and to follow with SLP.\par - Referred for physical therapy.\par - Continue f/u with Jenniffer.\par - F/u 4-6 weeks after completion of RT.\par - Call/RTC should any worrisome symptoms develop.\par - Pt verbalized understanding of above.

## 2020-09-29 NOTE — REVIEW OF SYSTEMS
[As Noted in HPI] : as noted in HPI [Negative] : Heme/Lymph [de-identified] : Distress Screening performed: Anxiety: 1, Depression: 6. Offered SW and counseling services - family deferred

## 2020-09-29 NOTE — PHYSICAL EXAM
[Normal] : orientation to person, place, and time: normal [de-identified] : healed neck dissection scar, trach stoma closed, mild lymphedema, some sutures on left lateral neck  [de-identified] : healthy lobulated ALT flap, tongue is mobile, no dehiscence, no bleeding, no purulent discharge or erythema noted.  [de-identified] : mild L CNXI weakness

## 2020-09-29 NOTE — DATA REVIEWED
[de-identified] : [id number]\par \par HENRY VERAS                     6\par \par Surgical Final Report\par \par Final Diagnosis\par \par 1. Left neck dissection 2:\par - 22 lymph nodes negative for tumor (0/22).\par \par 2. Left neck dissection level 3:\par - 17 lymph nodes negative for tumor (0/17).\par \par 3. Left neck dissection, level 4:\par - 14, lymph nodes negative for tumor (0/14).\par \par 4. Left neck dissection, level 5:\par - 13, lymph nodes negative for tumor (0/13).\par \par 5. Left neck dissection level 2b:\par - One lymph node negative for tumor (0/1).\par \par 6. Left neck dissection level 1:\par - Nine lymph nodes negative for tumor (0/9).\par - Unremarkable submandibular gland:\par \par 7. Left neck dissection, level 1a:\par - Three lymph nodes negative for tumor (0/3).\par \par 8. Left deep tongue margin, anterior:\par - Skeletal muscle, negative for tumor.\par \par 9. Left anterior mucosal margin:\par - Squamous mucosa and skeletal muscle negative for tumor.\par \par 10. Left floor of mouth mucosal margin:\par - Squamous mucosa and skeletal muscle negative for tumor.\par \par 11. Left base of tongue mucosal margin:\par - Squamous mucosa and skeletal muscle negative for tumor.\par \par 12. Left deep posterior base of tongue, margin:\par - Skeletal muscle and adipose tissue negative for tumor.\par \par 13. Left mandibular mucosal margin:\par - Squamous mucosa negative for tumor.\par \par 14. Left extended hemiglossectomy specimen:\par - Tongue with an infiltrating  moderately differentiated,\par keratinizing squamous cell carcinoma, 3.5 cm. which extends into\par underlying skeletal muscle.\par - Depth of infiltration as per gross description 2.7 cm\par - One lymph node, negative for tumor (0/1).\par \par - See synoptic report.\par \par Verified by: Matilda Peters MD\par (Electronic Signature)\par Reported on: 09/04/20 14:05 EDT, [facility], [address]\[place] [address]\par Phone: [phone]   Fax: [phone]\par _________________________________________________________________\par \par Intraoperative Consultation\par [id number]: Negative for carcinoma or high-grade dysplasia.\par /PH 09/01/20 13:35\par \par Comment\par mol 14D\par \par Synoptic Summary\par Procedure\par Extended hemiglossectomy and left, lymph node dissections (level 1-5)\par Tumor Site\par Tongue, lateral aspect\par Tumor laterality\par Left\par Tumor focality\par unifocal\par Tumor size\par 3.5 cm\par Histologic Type\par Squamous cell carcinoma, conventional\par Histologic grade\par Grade 2, moderately differentiated\par Specimen margins\par Uninvolved by invasive carcinoma\par Closest margin on main specimen, deep, 0.4 cm\par Tumor bed margin orientation\par Oriented true margin surface\par Tumor bed margins\par Uninvolved by invasive carcinoma\par Lymphovascular invasion\par Not identified\par Perineural invasion\par Not identified\par Regional lymph nodes\par 80 lymph nodes, negative for tumor (0/80).\par Pathologic stage classification, pTMN, AJCC 8th edition\par [id number]\par \par Clinical History\par Malignant neoplasm of tongue\par \par Specimen(s) Submitted\par 1.Left Neck Dissection level 2\par 2. Left Neck Dissection level 3\par 3. Left Neck Dissection level 4\par 4. Left Neck Dissection level 5\par 5. Left Neck Dissection level 2b\par 6. Left Neck Dissection level 1\par 7. Left Neck Dissection level 1a\par 8. Left deep Tongue margin anterior\par 9. Left anterior mucosal margin\par 10. Left floor of mouth mucosal magin\par 11. Left base of tounge mucosal margin\par 12. eft deep Posterior base of tounge margin\par 13. Left Mandibular mucosal margin\par 14. Left extended ilda glossectomy\par \par Gross Description\par This case is received in fourteen parts:\par \par 1. The specimen is received fresh, labeled with the patient's\par identification and "left neck dissection level 2."  It consists\par of one irregular piece of yellow-tan to brown-tan and lobulated\par to ragged fibroadipose tissue measuring 4.6 x 2.9 x 2.2 cm.\par Sectioning reveals 31 pink-tan to tan-yellow possible lymph nodes\par ranging from 0.2-1.6 cm in greatest dimension.  Bisecting the\par largest possible lymph node reveals a tan-pink to tan-yellow cut\par surface.  The possible lymph nodes are entirely submitted in\par eight cassettes as: 1A-five possible lymph nodes; 1B-five\par possible lymph nodes; 1C-five possible lymph nodes; 1D-five\par possible lymph nodes; 1E-five possible lymph nodes; 1F-three\par possible lymph nodes; 1G-two possible lymph nodes; 1H-one\par possible lymph node, bisected.\par 2. The specimen is received fresh, labeled with the patient's\par identification and "left neck dissection level 3."  It consists\par of two irregular pieces of yellow-tan to brown-tan and lobulated\par to ragged fibroadipose tissue measuring 4.3 x 2.7 x 2 cm in\par aggregate.  Sectioning reveals 20 brown-tan to tan-yellow\par possible lymph nodes ranging from 0.2-1.8 cm in greatest\par dimension.  Bisecting the largest possible lymph node reveals a\par pink brown and hemorrhagic cut surface.  The possible lymph nodes\par are entirely submitted in five cassettes as: 2A-five possible\par lymph nodes; 2B-five possible lymph nodes; 2C-five possible lymph\par nodes; 2D-four possible lymph nodes; 2E-one possible lymph node,\par bisected.\par 3. The specimen is received fresh, labeled with the patient's\par identification and "left neck dissection level 4."  It consists\par of one irregular piece of yellow-tan to brown-tan and lobulated\par to ragged fibroadipose tissue measuring 4.2 x 3.1 x 1.5 cm.\par Sectioning reveals 19 brown-tan to tan-yellow possible lymph\par nodes ranging from 0.2-1.2 cm in greatest dimension.  Bisecting\par the largest possible lymph node reveals a brown-tan to yellow-tan\par cut surface.  The possible lymph nodes are entirely submitted in\par five cassettes as: 3A-five possible lymph nodes; 3B-five possible\par lymph nodes; 3C-five possible lymph nodes; 3D-three possible\par lymph nodes; 3E-one possible lymph node, bisected.\par 4. The specimen is received fresh, labeled with the patient's\par identification and "left neck dissection level 5."  It consists\par of one irregular piece of yellow-tan to brown-tan and lobulated\par to ragged fibroadipose tissue measuring 3.5 x 3 x 1.6 cm.\par Sectioning reveals 17 brown-tan to tan-yellow possible lymph\par nodes ranging from 0.3-1.2 cm in greatest dimension.  The\par possible lymph nodes are entirely submitted in four cassettes as:\par 4A-five possible lymph nodes; 4B-five possible lymph nodes; 4C-\par four possible lymph nodes; 4D-three possible lymph nodes.\par 5. The specimen is received fresh, labeled with the patient's\par identification and "left neck dissection level 2B."  It consists\par of one irregular piece of yellow-tan to brown-tan and lobulated\par to ragged fibroadipose tissue measuring 2.5 x 2 x 0.8 cm.\par Sectioning reveals three contains a yellow-tan possible lymph\par nodes ranging from 0.5 and 0.7 cm in greatest dimension.  The\par possible lymph nodes are entirely submitted in one cassettes, 5A.\par 6. The specimen is received fresh, labeled with the patient's\par identification and "left neck dissection level 1."  It consists\par of one irregular piece of brown-tan to yellow-tan and lobulated\par to ragged fibroadipose tissue measuring 5.8 x 5.5 x 2.6 cm.\par Sectioning reveals an 11 g tan-brown fibroglandular structure\par measuring 3.8 x 3.5 x 1.8 cm, as well as 21 brown-tan to tan-\par yellow possible lymph nodes ranging from 0.3-1.2 cm in greatest\par dimension.  Sectioning the fibroglandular structure reveals a\par tan-pink to tan-brown and lobulated cut surface with a white-tan,\par slightly firm, and ill-defined focus measuring 0.4 x 0.3 x 0.3\par cm.  Bisecting the larger possible lymph nodes reveals a gray-\par white to brown-tan.  The possible lymph nodes are entirely\par submitted in the glandular structures representatively submitted\par in 10 cassettes as: 6A-five possible lymph nodes; 6B-five\par possible lymph nodes; 6C-five possible lymph nodes; 6D-four\par possible lymph nodes; 6E-one possible lymph node, bisected; 6F-\par one possible lymph node, bisected; 7Q-8S-mcalvzwnupisud structure\par (6G-white-tan and ill-defined focus).\par 7. The specimen is received fresh, labeled with the patient's\par identification and "left neck dissection level 1A."  It consists\par of one irregular piece of yellow-tan to brown-tan and lobulated to ragged fibroadipose\par tissue measuring 4.1 x 2.2 x 1.2 cm.  Sectioning reveals eight\par brown-tan to tan-yellow possible lymph nodes ranging from 0.2-0.9\par cm in greatest dimension.  Bisecting the largest possible lymph\par node reveals a gray-tan cut surface.  The possible lymph nodes\par are entirely submitted in three cassettes as: 7A-five possible\par lymph nodes; 7B-two possible lymph nodes; 7C-one possible lymph\par node, bisected.\par Beatriz Ward MS, PA([facility]) 09/02/2020 10:26\par 8. The specimen is received fresh for frozen section, labeled\par with the patient's identification and "left deep tongue margin\par anterior."  It consists of one irregular piece of red-tan muscle\par measuring 1.7 x 1.4 x 0.4 cm.  The specimen is frozen in toto and\par entirely submitted in one cassette, 8FSA.\par 9. The specimen is received fresh for frozen section, labeled\par with the patient's identification and "left anterior mucosal\par margin."  It consists of one strip of white mucosa measuring 1.6\par x 0.7 x 0.2 cm.  The specimen is frozen and entirely submitted in\par one cassette, 9FSA.\par 10. The specimen is received fresh for frozen section, labeled\par with the patient's identification and "left floor of mouth\par mucosal margin."  It consists of one strip of white mucosa\par measuring 1.2 x 0.4 x 0.2 cm.  The specimen is frozen and\par entirely submitted in one cassette, 10FSA.\par 11. The specimen is received fresh for frozen section, labeled\par with the patient's identification and "left base of tongue\par mucosal margin."  It consists of one strip of white mucosa\par measuring 1.4 x 0.7 x 0.5 cm.  The specimen is frozen and\par entirely submitted in one cassette, 11FSA.\par 12. The specimen is received fresh for frozen section, labeled\par with the patient's identification and "left deep posterior base\par of tongue margin."  It consists of one irregular piece of red-tan\par muscle measuring 1.2 x 0.8 x 0.2 cm.  The specimen is frozen in\par toto and entirely submitted in one cassette, 12FSA.\par 13. The specimen is received fresh for frozen section, labeled\par with the patient's identification and "left mandibular mucosal\par margin."  It consists of one strip of white mucosa measuring 0.9\par x 0.2 x 0.1 cm.  The specimen is frozen and entirely submitted in\par one cassette, 13FSA.\par ELTON Justin [facility] 09/01/2020 13:44\par \par 14. The specimen is received fresh, labeled with the patient's\par identification and "left extended ilda-glossectomy."  It consists\par of a left hemiglossectomy specimen measuring 8.9 x 4.5 x 4.1 cm.\par The specimen is inked by the surgeon as: blue=posterior, green=superior, orange=anterior, black=deep,\par yellow=inferior.  The superior, lateral, and anteroinferior\par aspect of the specimen is surface by tan-pink mucosa with\par apparent taste buds.  The lateral mucosa is remarkable for a 3.5\par x 2.6 cm brown-tan to white-tan, centrally ulcerated and\par peripherally heaped-up and firm lesion.  This lesion is 0.5 cm\par from the posterior margin, 2 cm from the superior margin, 2.5 cm\par from the inferior margin, 2.5 cm from the anterior margin, and 2\par cm from the deep margin.  A 3.3 x 3.2 x 2.7 cm ill-defined,\par white-tan to pink-tan, lobulated, and firm to friable mass that\par underlies the ulcerated mucosa and is surrounded by scattered\par hemorrhage.  The mass is 0.5 cm from the posterior margin, 0.1 cm\par from the superior margin, 2 cm from the anterior margin, abuts\par the deep margin, and is 1.2 cm from the inferior margin.  The\par uninvolved cut surface is tan-brown, striated, and multifocally\par hemorrhagic.  Additionally, there is a 4.1 x 3.2 x 1.6 cm\par glandular structure located lateral and inferior to the tongue,\par that shows a tan-brown, lobulated, and hemorrhagic cut surface.\par The mass is located 0.6 cm from this glandular structure.\par Seventeen tan-brown possible lymph nodes are identified, which\par range from 0.1-1 cm in greatest dimension.  Representative\par sections are submitted in 14 cassettes as: [id number] (14A and\par 14B-closest perpendicular posterior margin; 14C and 14D-ulcerated\par mucosa; 14E and 14F-paired, abutting deep and closest glandular\par structure; 14G-closest superior margin; 14H-closest inferior\par margin); 14I-closest anterior margin; 14J-uninvolved tongue with\par possible additional glandular tissue; 14K-five possible lymph\par nodes; 14L-five possible lymph nodes; 14M-five possible lymph\par nodes; 14N-two possible lymph nodes.\par Beatriz Ward MS, PA([facility]) 09/02/2020 11:46

## 2020-09-30 ENCOUNTER — OUTPATIENT (OUTPATIENT)
Dept: OUTPATIENT SERVICES | Facility: HOSPITAL | Age: 65
LOS: 1 days | Discharge: HOME | End: 2020-09-30
Payer: MEDICARE

## 2020-09-30 DIAGNOSIS — I12.0 HYPERTENSIVE CHRONIC KIDNEY DISEASE WITH STAGE 5 CHRONIC KIDNEY DISEASE OR END STAGE RENAL DISEASE: ICD-10-CM

## 2020-09-30 DIAGNOSIS — E11.22 TYPE 2 DIABETES MELLITUS WITH DIABETIC CHRONIC KIDNEY DISEASE: ICD-10-CM

## 2020-09-30 DIAGNOSIS — N40.0 BENIGN PROSTATIC HYPERPLASIA WITHOUT LOWER URINARY TRACT SYMPTOMS: ICD-10-CM

## 2020-09-30 DIAGNOSIS — Z99.2 DEPENDENCE ON RENAL DIALYSIS: ICD-10-CM

## 2020-09-30 DIAGNOSIS — Z79.890 HORMONE REPLACEMENT THERAPY: ICD-10-CM

## 2020-09-30 DIAGNOSIS — Z85.810 PERSONAL HISTORY OF MALIGNANT NEOPLASM OF TONGUE: ICD-10-CM

## 2020-09-30 DIAGNOSIS — R68.0 HYPOTHERMIA, NOT ASSOCIATED WITH LOW ENVIRONMENTAL TEMPERATURE: ICD-10-CM

## 2020-09-30 DIAGNOSIS — N25.0 RENAL OSTEODYSTROPHY: ICD-10-CM

## 2020-09-30 DIAGNOSIS — Z93.1 GASTROSTOMY STATUS: ICD-10-CM

## 2020-09-30 DIAGNOSIS — I77.0 ARTERIOVENOUS FISTULA, ACQUIRED: Chronic | ICD-10-CM

## 2020-09-30 DIAGNOSIS — Z79.82 LONG TERM (CURRENT) USE OF ASPIRIN: ICD-10-CM

## 2020-09-30 DIAGNOSIS — D64.9 ANEMIA, UNSPECIFIED: ICD-10-CM

## 2020-09-30 DIAGNOSIS — E03.9 HYPOTHYROIDISM, UNSPECIFIED: ICD-10-CM

## 2020-09-30 DIAGNOSIS — N18.6 END STAGE RENAL DISEASE: ICD-10-CM

## 2020-09-30 DIAGNOSIS — Z90.49 ACQUIRED ABSENCE OF OTHER SPECIFIED PARTS OF DIGESTIVE TRACT: Chronic | ICD-10-CM

## 2020-09-30 DIAGNOSIS — Z79.84 LONG TERM (CURRENT) USE OF ORAL HYPOGLYCEMIC DRUGS: ICD-10-CM

## 2020-09-30 DIAGNOSIS — Z43.0 ENCOUNTER FOR ATTENTION TO TRACHEOSTOMY: ICD-10-CM

## 2020-10-02 ENCOUNTER — APPOINTMENT (OUTPATIENT)
Dept: RADIATION ONCOLOGY | Facility: CLINIC | Age: 65
End: 2020-10-02
Payer: MEDICARE

## 2020-10-02 PROCEDURE — 77263 THER RADIOLOGY TX PLNG CPLX: CPT

## 2020-10-02 PROCEDURE — 99204 OFFICE O/P NEW MOD 45 MIN: CPT | Mod: 25,GC,95

## 2020-10-05 NOTE — REVIEW OF SYSTEMS
[Cough] : cough [Negative] : Allergic/Immunologic [Salivary duct inflammation: Grade 3 - Acute salivary gland necrosis; severe secretion-induced symptoms (e.g., thick saliva/oral secretions or gagging);  tube feeding or TPN indicated; limiting self care ADL; disabling] : Salivary duct inflammation: Grade 3 - Acute salivary gland necrosis; severe secretion-induced symptoms (e.g., thick saliva/oral secretions or gagging);  tube feeding or TPN indicated; limiting self care ADL; disabling [Cough: Grade 1 - Mild symptoms; nonprescription intervention indicated] : Cough: Grade 1 - Mild symptoms; nonprescription intervention indicated [Abdominal Pain] : no abdominal pain [Vomiting] : no vomiting [Constipation] : no constipation [Diarrhea] : no diarrhea [FreeTextEntry4] : Excessive oral mucus and phlegm [FreeTextEntry7] : PEG [FreeTextEntry8] : D

## 2020-10-05 NOTE — HISTORY OF PRESENT ILLNESS
[Home] : at home, [unfilled] , at the time of the visit. 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 Temporary trach and PEG were placed. [facility] would not accept patients with tracheostomies, so he was readmitted 9/19-9/21 for decannulation and hemodialysis. Tumor board consensus for post-op RT including contralateral neck.\par \par Surgical path: infiltrating moderately differentiated, keratinizing SCC, 3.5 cm, which extends into underlying skeletal muscle, DOI 2.7 cm, negative margins, 0/80 LNs, no LVI, no PNI.\par \par He is tolerating 6 cans Neprorth/day via PEG. Reports weight gain since surgery. \par \par Pre-op imaging:\par PET-CT 8/7/20:\par FDG avid left tongue mass consistent with patient's known cancer. Mild FDG uptake in a 0.9cm left level 1B lymph node which is suspicious for malignancy.\par \par MR Orbit/Face/Neck 8/7/20:\par Large enhancing malignancy involves the left mid-posterior oral tongue, where anteriorly there is small volume breach of midline with depth of invasion exceeding 2 cm, and deep and laterally there is probable involvement of extrinsic tongue musculature. There is no gross evidence of mandibular invasion, or disease at the skull base.\par \par Small but asymmetric left level 1B nodes are suspicious for metastases.\par \par Dialysis days: Tuesday, Thursday and Saturday.\par He has not been evaluated by speech and swallowing specialist\par

## 2020-10-16 ENCOUNTER — OUTPATIENT (OUTPATIENT)
Dept: OUTPATIENT SERVICES | Facility: HOSPITAL | Age: 65
LOS: 1 days | Discharge: HOME | End: 2020-10-16
Payer: MEDICARE

## 2020-10-16 ENCOUNTER — APPOINTMENT (OUTPATIENT)
Dept: RADIATION ONCOLOGY | Facility: CLINIC | Age: 65
End: 2020-10-16
Payer: MEDICARE

## 2020-10-16 ENCOUNTER — APPOINTMENT (OUTPATIENT)
Dept: HEMATOLOGY ONCOLOGY | Facility: CLINIC | Age: 65
End: 2020-10-16
Payer: MEDICARE

## 2020-10-16 VITALS
TEMPERATURE: 97.7 F | HEIGHT: 70 IN | RESPIRATION RATE: 17 BRPM | WEIGHT: 148.81 LBS | SYSTOLIC BLOOD PRESSURE: 124 MMHG | HEART RATE: 90 BPM | DIASTOLIC BLOOD PRESSURE: 60 MMHG | OXYGEN SATURATION: 99 % | BODY MASS INDEX: 21.3 KG/M2

## 2020-10-16 VITALS
BODY MASS INDEX: 21.35 KG/M2 | TEMPERATURE: 97.2 F | OXYGEN SATURATION: 99 % | WEIGHT: 148.81 LBS | DIASTOLIC BLOOD PRESSURE: 66 MMHG | SYSTOLIC BLOOD PRESSURE: 156 MMHG | HEART RATE: 91 BPM | RESPIRATION RATE: 15 BRPM

## 2020-10-16 DIAGNOSIS — C02.9 MALIGNANT NEOPLASM OF TONGUE, UNSPECIFIED: ICD-10-CM

## 2020-10-16 DIAGNOSIS — Z01.818 ENCOUNTER FOR OTHER PREPROCEDURAL EXAMINATION: ICD-10-CM

## 2020-10-16 DIAGNOSIS — Z90.49 ACQUIRED ABSENCE OF OTHER SPECIFIED PARTS OF DIGESTIVE TRACT: Chronic | ICD-10-CM

## 2020-10-16 DIAGNOSIS — I77.0 ARTERIOVENOUS FISTULA, ACQUIRED: Chronic | ICD-10-CM

## 2020-10-16 DIAGNOSIS — Z98.890 OTHER SPECIFIED POSTPROCEDURAL STATES: ICD-10-CM

## 2020-10-16 PROCEDURE — 99205 OFFICE O/P NEW HI 60 MIN: CPT

## 2020-10-16 PROCEDURE — 99214 OFFICE O/P EST MOD 30 MIN: CPT | Mod: 25,GC

## 2020-10-16 PROCEDURE — 77334 RADIATION TREATMENT AID(S): CPT | Mod: 26

## 2020-10-16 PROCEDURE — 77333 RADIATION TREATMENT AID(S): CPT | Mod: 26,59

## 2020-10-16 RX ORDER — 70%ISOPROPYL ALCOHOL 0.7 ML/ML
70 SWAB TOPICAL
Refills: 0 | Status: ACTIVE | COMMUNITY

## 2020-10-16 NOTE — ASSESSMENT
[FreeTextEntry1] : 64 yo m with resected tongue ca, here to discuss adjuvant treatment\par Reviewed images and path, margins negative and no ECS\par No indication for adjuvant chemo\par A/w adjuvant RT\par If pt needs supportive services, hydration, IV pain meds etc during tx, he knows he can reach out to us\par All questions answered\par Speech and swallow eval\par OV prn\par  [Curative] : Goals of care discussed with patient: Curative

## 2020-10-16 NOTE — CONSULT LETTER
[Dear  ___] : Dear  [unfilled], [Consult Letter:] : I had the pleasure of evaluating your patient, [unfilled]. [Consult Closing:] : Thank you very much for allowing me to participate in the care of this patient.  If you have any questions, please do not hesitate to contact me. [Please see my note below.] : Please see my note below. [Sincerely,] : Sincerely, [FreeTextEntry3] : Jimbo Hayes MD\par \par  [FreeTextEntry2] : Dr. Ez Marie [DrSil  ___] : Dr. MARTE

## 2020-10-16 NOTE — HISTORY OF PRESENT ILLNESS
[Disease: _____________________] : Disease: [unfilled] [de-identified] : Mr. Pederson is a pleasant 65M started experiencing tongue pain around March 2020. Saw PCP for this- was given mouthwash which did not help. He saw Dr. Walter who referred him to Dr. 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 Temporary trach and PEG were placed. [facility] would not accept patients with tracheostomies, so he was readmitted 9/19-9/21 for decannulation and hemodialysis. Tumor board consensus for post-op RT including contralateral neck. Surgical path: infiltrating moderately differentiated, keratinizing SCC, 3.5 cm, which extends into underlying skeletal muscle, DOI 2.7 cm, negative margins, 0/80 LNs, no LVI, no PNI. He is tolerating 6 cans Neprorth/day via PEG. Reports weight gain since surgery. \par \par Of note, pt has been on HD since April of 2020 for HTN-induced renal failure. \par \par \par \par \par  [de-identified] : squamous cell ca

## 2020-10-16 NOTE — PHYSICAL EXAM
[Normal] : affect appropriate [Fully active, able to carry on all pre-disease performance without restriction] : Status 0 - Fully active, able to carry on all pre-disease performance without restriction [de-identified] : tongue reconstructed.  [de-identified] : donor skin site healed [de-identified] : recent surgical incision looks clean, trach site closed- clean

## 2020-10-19 ENCOUNTER — RESULT REVIEW (OUTPATIENT)
Age: 65
End: 2020-10-19

## 2020-10-19 NOTE — REVIEW OF SYSTEMS
[Negative] : Allergic/Immunologic [Tinnitus - Grade 0] : Tinnitus - Grade 0 [Blurred Vision: Grade 0] : Blurred Vision: Grade 0 [Xerostomia: Grade 0] : Xerostomia: Grade 0 [Mucositis Oral: Grade 0] : Mucositis Oral: Grade 0  [Salivary duct inflammation: Grade 1 - Slightly thickened saliva; slightly altered taste (e.g., metallic)] : Salivary duct inflammation: Grade 1 - Slightly thickened saliva; slightly altered taste (e.g., metallic) [Oral Pain: Grade 0] : Oral Pain: Grade 0 [Dysgeusia: Grade 0] : Dysgeusia: Grade 0 [Hoarseness: Grade 0] : Hoarseness: Grade 0 [Cough: Grade 1 - Mild symptoms; nonprescription intervention indicated] : Cough: Grade 1 - Mild symptoms; nonprescription intervention indicated [FreeTextEntry4] : h/o surgery for tongue cancer [FreeTextEntry7] : with feeding tube [FreeTextEntry6] : feeding tube

## 2020-10-19 NOTE — HISTORY OF PRESENT ILLNESS
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 Temporary trach and PEG were placed. [facility] would not accept patients with tracheostomies, so he was readmitted 9/19-9/21 for decannulation and hemodialysis. Tumor board consensus for post-op RT including contralateral neck.\par \par Surgical path: infiltrating moderately differentiated, keratinizing SCC, 3.5 cm, which extends into underlying skeletal muscle, DOI 2.7 cm, negative margins, 0/80 LNs, no LVI, no PNI.\par \par He is tolerating 6 cans Neprorth/day via PEG. Reports weight gain since surgery. \par \par Pre-op imaging:\par PET-CT 8/7/20:\par FDG avid left tongue mass consistent with patient's known cancer. Mild FDG uptake in a 0.9cm left level 1B lymph node which is suspicious for malignancy.\par \par MR Orbit/Face/Neck 8/7/20:\par Large enhancing malignancy involves the left mid-posterior oral tongue, where anteriorly there is small volume breach of midline with depth of invasion exceeding 2 cm, and deep and laterally there is probable involvement of extrinsic tongue musculature. There is no gross evidence of mandibular invasion, or disease at the skull base.\par \par Small but asymmetric left level 1B nodes are suspicious for metastases.\par \par Dialysis days: Tuesday, Thursday and Saturday.\par \par Interval 10/16/20:\par Presents for f/u for consent signing and simulation.

## 2020-10-20 LAB — SURGICAL PATHOLOGY STUDY: SIGNIFICANT CHANGE UP

## 2020-10-22 DIAGNOSIS — Z93.1 GASTROSTOMY STATUS: ICD-10-CM

## 2020-10-23 ENCOUNTER — APPOINTMENT (OUTPATIENT)
Dept: SPEECH THERAPY | Facility: CLINIC | Age: 65
End: 2020-10-23
Payer: MEDICARE

## 2020-10-23 PROCEDURE — 99072 ADDL SUPL MATRL&STAF TM PHE: CPT

## 2020-10-23 PROCEDURE — 92610 EVALUATE SWALLOWING FUNCTION: CPT | Mod: GN

## 2020-10-27 NOTE — ASSESSMENT
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 Darrell Coley and Dr. Doris Mcallister\par Procedure Code: 92035\par   \par REASON FOR REFERRAL:  Mr. Pat Pederson is a 65 year-old male who was seen for a comprehensive Clinical Dysphagia Evaluation at the [facility] and [facility] upon the referral of his otolaryngologist/Dr. Darrell Coley and radiation oncologist/Dr. Doris Mcallister to clinically assess oral and pharyngeal stages of swallow function. Per outpatient consult with ENT/Dr. Ez Marie 20: "64M with [id number] left oral tongue SCC s/p 2020 left extended hemiglossectomy, resection left anterior floor of mouth, left neck dissection, and ALT flap. Temporary trach and PEG were placed. [facility] would not accept patients with tracheostomies, so he was readmitted - for decannulation and hemodialysis. Tumor board consensus for post-op RT including contralateral neck."\par \par HISTORY OF PRESENTING ILLNESS:  Mr. Stewart presented to today’s session accompanied by his wife who assisted in providing relevant information related to the patient's case history. According to his wife and medical documentation, the patient initially presented to ENT/Dr. Sukhdev Walter (20) with complaints of a tongue lesion for 2 months, painful and increasing in size, with biopsy (20) showing well-differentiated SCC. He was then referred to Dr. Ez Marie,which led to aforementioned surgery on 20 at [facility], with subsequent tracheostomy (now decannulated) and PEG tube placement. The patient was seen by oncology/Dr. Mcallister for radiation simulation on 10/16/20, now with plan to receive 30 fractions of adjuvant radiation to the tongue and neck region without concurrent chemotherapy. According to the patient, he has been tolerating PEG feeds (6 cans of Neprorth/day) and supplementing with small amounts of soft/pureed foods and thin liquids via oral intake, with reported weight gain since his surgery.  The patient also receives hemodialysis 3x weekly since 2020 for ESRD.\par \par CURRENT NUTRITIONAL INTAKE: Primary means of nutrition/hydration met via PEG (placed 20); patient is supplementing with small amounts of soft/pureed foods and thin liquids via oral intake.\par  	 \par MEDICAL HISTORY:\par Active Problems\par Encounter for consultation (V65.9) (Z71.9)\par Preop testing (V72.84) (Z01.818)\par Pre-op testing (V72.84) (Z01.818)\par Pre-transplant evaluation for kidney transplant (V72.83) (Z01.818)\par Squamous cell carcinoma of tongue (141.9) (C02.9)\par Status post dissection of neck (V45.89) (Z98.890)\par \par Past Medical History\par History of diabetes mellitus (V12.29) (Z86.39)\par History of end stage renal disease (V13.09) (Z87.448)\par History of hypertension (V12.59) (Z86.79)\par \par Surgical History\par History of Appendectomy\par History of Free flap\par History of Glossectomy\par History of Neck dissection modified radical\par \par Social History\par Never a smoker\par No alcohol use\par \par Current Meds\par Alcohol Prep 70 % Pad\par Aspirin TABS\par Atorvastatin Calcium 40 MG Oral Tablet; TAKE 1 TABLET AT BEDTIME\par Carvedilol 25 MG Oral Tablet; TAKE 1 TABLET TWICE DAILY\par Flomax 0.4 MG Oral Capsule; TAKE 1 CAPSULE AT BEDTIME\par Furosemide 40 MG Oral Tablet; TAKE 1 TABLET TWICE DAILY\par glipiZIDE 10 MG Oral Tablet\par hydrALAZINE HCl - 50 MG Oral Tablet; TAKE 1 TABLET 3 TIMES DAILY\par Januvia 50 MG Oral Tablet; TAKE 1 TABLET DAILY\par Levothyroxine Sodium 75 MCG Oral Tablet\par Norvasc 10 MG Oral Tablet\par Synthroid TABS\par \par Allergies\par No Known Drug Allergies\par \par CLINICAL FINDINGS\par Oral Peripheral Assessment: \par Structures:   +anatomical differences associated with s/p left hemiglossectomy and resection of left anterior floor of mouth (20)\par Symmetry:   Within Functional Limits\par Dentition:   Incomplete\par Soft Palate:   Within functional limits\par Labial:   Within functional limits\par Lingual:   Reduced lingual strength and range of motion upon protrusion, elevation and lateralization\par Mandibular:   Within functional limits\par Secretions:   Adequate\par Volitional Swallow:   Intact\par Volitional Cough:   Strong\par Voice:   Mild hoarseness and reduced intensity perceived upon informal conversational discourse\par Cognition/Communication: Within Functional Limits\par Motor Speech: Reduced articulatory precision consistent with recent surgical history aforementioned\par  \par Consistencies Administered: \par Solids:  ___ Regular   _X_ Mechanical Soft   _X_Puree	\par Liquids:  _X__ Thin   __  Nectar Thick   __Honey Thick\par _X   via single sips             __ via consecutive sips\par \par Oral Stage: Mr. Pederson demonstrated a mild to moderate oral dysphagia characterized by adequate oral acceptance and containment, slow mastication (for mech soft textures), decreased bolus collection with trace pooling in anterior sulcus, and reduced lingual motion with delayed anterior-posterior transfer for PO trials of puree, mechanical soft and thin liquids.\par  \par Pharyngeal Stage: Mr. Pederson demonstrated a mild pharyngeal dysphagia characterized by a suspected delay in pharyngeal trigger with reduced laryngeal elevation upon digital palpation and no overt, clinical signs/symptoms of laryngeal penetration/aspiration across PO trials of puree and thin liquids self-administered via small, single cup sips.\par \par IMPRESSIONS: Oropharyngeal Dysphagia\par  \par PROGNOSIS: Fair with therapeutic intervention\par  \par RECOMMENDATIONS:\par 1) Continue primary means of nutrition/hydration/medication via PEG feeds. Allow oral supplementation of puree and thin liquids via small intake amounts, as tolerated.\par 2) Maintain upright position (90 degrees) during/after oral feeding for 30 minutes; Slow rate of intake; Small amounts at a time; Allow time between swallows; No straws.\par 3) Swallow Therapy is warranted to maximize the overall swallow mechanism and prevent progression of symptoms in the setting of radiation sequelae\par 4) Follow up with physician(s) as directed\par  \par EDUCATION:  Verbal and written educational information and instruction were provided to the patient and his spouse. Full understanding was verbalized.\par \par Should you have any additional concerns, please contact this center at [phone].\par \par Yeimi Hilliard M.S., CCC-SLP\par Speech-Language Pathologist\par [facility]\par

## 2020-10-29 PROCEDURE — 77301 RADIOTHERAPY DOSE PLAN IMRT: CPT | Mod: 26

## 2020-10-29 PROCEDURE — 77300 RADIATION THERAPY DOSE PLAN: CPT | Mod: 26

## 2020-10-29 PROCEDURE — 77338 DESIGN MLC DEVICE FOR IMRT: CPT | Mod: 26

## 2020-10-30 ENCOUNTER — NON-APPOINTMENT (OUTPATIENT)
Age: 65
End: 2020-10-30

## 2020-11-02 ENCOUNTER — RESULT REVIEW (OUTPATIENT)
Age: 65
End: 2020-11-02

## 2020-11-02 PROCEDURE — 88321 CONSLTJ&REPRT SLD PREP ELSWR: CPT

## 2020-11-06 ENCOUNTER — NON-APPOINTMENT (OUTPATIENT)
Age: 65
End: 2020-11-06

## 2020-11-06 PROCEDURE — 77387B: CUSTOM | Mod: 26

## 2020-11-09 PROCEDURE — 77387B: CUSTOM | Mod: 26

## 2020-11-10 PROCEDURE — 77387B: CUSTOM | Mod: 26

## 2020-11-11 ENCOUNTER — APPOINTMENT (OUTPATIENT)
Dept: INFUSION THERAPY | Facility: HOSPITAL | Age: 65
End: 2020-11-11

## 2020-11-11 ENCOUNTER — APPOINTMENT (OUTPATIENT)
Dept: HEMATOLOGY ONCOLOGY | Facility: CLINIC | Age: 65
End: 2020-11-11
Payer: MEDICARE

## 2020-11-11 ENCOUNTER — NON-APPOINTMENT (OUTPATIENT)
Age: 65
End: 2020-11-11

## 2020-11-11 VITALS — BODY MASS INDEX: 20.76 KG/M2 | HEIGHT: 70 IN | RESPIRATION RATE: 16 BRPM | WEIGHT: 145 LBS

## 2020-11-11 DIAGNOSIS — E86.0 DEHYDRATION: ICD-10-CM

## 2020-11-11 PROCEDURE — 99213 OFFICE O/P EST LOW 20 MIN: CPT

## 2020-11-11 PROCEDURE — 99072 ADDL SUPL MATRL&STAF TM PHE: CPT

## 2020-11-11 PROCEDURE — 77387B: CUSTOM | Mod: 26

## 2020-11-11 NOTE — PHYSICAL EXAM
[Fully active, able to carry on all pre-disease performance without restriction] : Status 0 - Fully active, able to carry on all pre-disease performance without restriction [Normal] : affect appropriate [de-identified] : tongue reconstructed.  [de-identified] : recent surgical incision looks clean, trach site closed- clean [de-identified] : donor skin site healed

## 2020-11-11 NOTE — ASSESSMENT
[FreeTextEntry1] : 66 yo m with resected tongue ca receiving XRT with curative intent. Started XRT on 11/5/2020\par Reviewed images and path, margins negative and no ECS\par No indication for adjuvant chemo but will support patient throughout radiation with gentle IVF weekly. Will defer electrolyte management to Dialysis team. He receives dialysis Tu, Thurs, Sat\par Speech and swallow eval\par PEG dependent. \par OV q 2w or prn\par  [Curative] : Goals of care discussed with patient: Curative

## 2020-11-11 NOTE — HISTORY OF PRESENT ILLNESS
[Disease: _____________________] : Disease: [unfilled] [de-identified] : Mr. Pederson is a pleasant 65M started experiencing tongue pain around March 2020. Saw PCP for this- was given mouthwash which did not help. He saw Dr. Walter who referred him to Dr. Marie for  [id number] left oral tongue SCC s/p 9/1/2020 left extended hemiglossectomy, resection left anterior floor of mouth, left neck dissection, and ALT flap (Jenniffer). Temporary trach (later decannulated) and PEG were placed.64M with [id number] left oral tongue SCC s/p 9/1/2020 left extended hemiglossectomy, resection left anterior floor of mouth, left neck dissection, and ALT flap (Jenniffer). Temporary trach and PEG were placed. [facility] would not accept patients with tracheostomies, so he was readmitted 9/19-9/21 for decannulation and hemodialysis. Tumor board consensus for post-op RT including contralateral neck. Surgical path: infiltrating moderately differentiated, keratinizing SCC, 3.5 cm, which extends into underlying skeletal muscle, DOI 2.7 cm, negative margins, 0/80 LNs, no LVI, no PNI. He is tolerating 6 cans Neprorth/day via PEG. Reports weight gain since surgery. \par \par Of note, pt has been on HD since April of 2020 for HTN-induced renal failure. \par \par 11/11/2020: Pt being seen in the treatment room to establish relationship with patient. He does not require CCRT. He started XRT for SCC of oral tongue. We will be following him for supportive care and hydration. He is a dialysis patient so IVF will not exceed 500cc. Pt gets dialysis Tu, Thur, Sat. He started XRT on 11/5.\par \par \par \par \par  [de-identified] : squamous cell ca

## 2020-11-11 NOTE — PHYSICAL EXAM
[Outer Ear] : the ears and nose were normal in appearance [Normal] : supple with no thyromegaly or masses appreciated

## 2020-11-12 ENCOUNTER — OUTPATIENT (OUTPATIENT)
Dept: OUTPATIENT SERVICES | Facility: HOSPITAL | Age: 65
LOS: 1 days | Discharge: HOME | End: 2020-11-12

## 2020-11-12 ENCOUNTER — NON-APPOINTMENT (OUTPATIENT)
Age: 65
End: 2020-11-12

## 2020-11-12 DIAGNOSIS — Z90.49 ACQUIRED ABSENCE OF OTHER SPECIFIED PARTS OF DIGESTIVE TRACT: Chronic | ICD-10-CM

## 2020-11-12 DIAGNOSIS — I77.0 ARTERIOVENOUS FISTULA, ACQUIRED: Chronic | ICD-10-CM

## 2020-11-12 DIAGNOSIS — C03.9 MALIGNANT NEOPLASM OF GUM, UNSPECIFIED: ICD-10-CM

## 2020-11-12 PROCEDURE — 77427 RADIATION TX MANAGEMENT X5: CPT

## 2020-11-12 PROCEDURE — 77014: CPT | Mod: 26

## 2020-11-13 ENCOUNTER — APPOINTMENT (OUTPATIENT)
Dept: SPEECH THERAPY | Facility: CLINIC | Age: 65
End: 2020-11-13
Payer: MEDICARE

## 2020-11-13 PROCEDURE — 92526 ORAL FUNCTION THERAPY: CPT | Mod: GN

## 2020-11-13 PROCEDURE — 77387B: CUSTOM | Mod: 26

## 2020-11-16 PROCEDURE — 77387B: CUSTOM | Mod: 26

## 2020-11-17 PROCEDURE — 77387B: CUSTOM | Mod: 26

## 2020-11-18 ENCOUNTER — APPOINTMENT (OUTPATIENT)
Dept: INFUSION THERAPY | Facility: HOSPITAL | Age: 65
End: 2020-11-18

## 2020-11-18 VITALS — BODY MASS INDEX: 20.76 KG/M2 | RESPIRATION RATE: 16 BRPM | WEIGHT: 145 LBS | HEIGHT: 70 IN

## 2020-11-18 DIAGNOSIS — E86.0 DEHYDRATION: ICD-10-CM

## 2020-11-18 PROCEDURE — 77387B: CUSTOM | Mod: 26

## 2020-11-19 PROCEDURE — 77427 RADIATION TX MANAGEMENT X5: CPT

## 2020-11-19 PROCEDURE — 77014: CPT | Mod: 26

## 2020-11-20 ENCOUNTER — APPOINTMENT (OUTPATIENT)
Dept: SPEECH THERAPY | Facility: CLINIC | Age: 65
End: 2020-11-20
Payer: MEDICARE

## 2020-11-20 ENCOUNTER — NON-APPOINTMENT (OUTPATIENT)
Age: 65
End: 2020-11-20

## 2020-11-20 PROCEDURE — 99072 ADDL SUPL MATRL&STAF TM PHE: CPT

## 2020-11-20 PROCEDURE — 77387B: CUSTOM | Mod: 26

## 2020-11-20 PROCEDURE — 92526 ORAL FUNCTION THERAPY: CPT | Mod: NC

## 2020-11-22 PROCEDURE — 77387B: CUSTOM | Mod: 26

## 2020-11-23 PROCEDURE — 77387B: CUSTOM | Mod: 26

## 2020-11-24 PROCEDURE — 77387B: CUSTOM | Mod: 26

## 2020-11-25 ENCOUNTER — NON-APPOINTMENT (OUTPATIENT)
Age: 65
End: 2020-11-25

## 2020-11-25 ENCOUNTER — APPOINTMENT (OUTPATIENT)
Dept: HEMATOLOGY ONCOLOGY | Facility: CLINIC | Age: 65
End: 2020-11-25
Payer: MEDICARE

## 2020-11-25 ENCOUNTER — APPOINTMENT (OUTPATIENT)
Dept: INFUSION THERAPY | Facility: HOSPITAL | Age: 65
End: 2020-11-25

## 2020-11-25 PROCEDURE — 99213 OFFICE O/P EST LOW 20 MIN: CPT

## 2020-11-25 PROCEDURE — 77014: CPT | Mod: 26

## 2020-11-25 PROCEDURE — 99072 ADDL SUPL MATRL&STAF TM PHE: CPT

## 2020-11-25 PROCEDURE — 77427 RADIATION TX MANAGEMENT X5: CPT

## 2020-11-27 ENCOUNTER — NON-APPOINTMENT (OUTPATIENT)
Age: 65
End: 2020-11-27

## 2020-11-30 ENCOUNTER — APPOINTMENT (OUTPATIENT)
Dept: SPEECH THERAPY | Facility: CLINIC | Age: 65
End: 2020-11-30
Payer: MEDICARE

## 2020-11-30 PROCEDURE — 77387B: CUSTOM | Mod: 26

## 2020-11-30 PROCEDURE — 92526 ORAL FUNCTION THERAPY: CPT | Mod: NC

## 2020-11-30 PROCEDURE — 99072 ADDL SUPL MATRL&STAF TM PHE: CPT

## 2020-12-01 PROCEDURE — 77387B: CUSTOM | Mod: 26

## 2020-12-02 ENCOUNTER — NON-APPOINTMENT (OUTPATIENT)
Age: 65
End: 2020-12-02

## 2020-12-02 ENCOUNTER — APPOINTMENT (OUTPATIENT)
Dept: INFUSION THERAPY | Facility: HOSPITAL | Age: 65
End: 2020-12-02

## 2020-12-02 VITALS — WEIGHT: 145 LBS | BODY MASS INDEX: 20.76 KG/M2 | RESPIRATION RATE: 16 BRPM | HEIGHT: 70 IN

## 2020-12-02 PROCEDURE — 77387B: CUSTOM | Mod: 26

## 2020-12-03 ENCOUNTER — NON-APPOINTMENT (OUTPATIENT)
Age: 65
End: 2020-12-03

## 2020-12-03 PROCEDURE — 77014: CPT | Mod: 26

## 2020-12-04 ENCOUNTER — NON-APPOINTMENT (OUTPATIENT)
Age: 65
End: 2020-12-04

## 2020-12-04 PROCEDURE — 77427 RADIATION TX MANAGEMENT X5: CPT

## 2020-12-04 PROCEDURE — 77387B: CUSTOM | Mod: 26

## 2020-12-05 DIAGNOSIS — Z01.818 ENCOUNTER FOR OTHER PREPROCEDURAL EXAMINATION: ICD-10-CM

## 2020-12-06 ENCOUNTER — APPOINTMENT (OUTPATIENT)
Dept: DISASTER EMERGENCY | Facility: CLINIC | Age: 65
End: 2020-12-06

## 2020-12-06 LAB
ALBUMIN SERPL ELPH-MCNC: 4.4 G/DL
ALBUMIN SERPL ELPH-MCNC: 4.7 G/DL
ALP BLD-CCNC: 104 U/L
ALP BLD-CCNC: 112 U/L
ALT SERPL-CCNC: 13 U/L
ALT SERPL-CCNC: 33 U/L
ANION GAP SERPL CALC-SCNC: 11 MMOL/L
ANION GAP SERPL CALC-SCNC: 15 MMOL/L
AST SERPL-CCNC: 15 U/L
AST SERPL-CCNC: 20 U/L
BASOPHILS # BLD AUTO: 0.04 K/UL
BASOPHILS # BLD AUTO: 0.04 K/UL
BASOPHILS NFR BLD AUTO: 0.7 %
BASOPHILS NFR BLD AUTO: 0.7 %
BILIRUB SERPL-MCNC: 0.2 MG/DL
BILIRUB SERPL-MCNC: 0.5 MG/DL
BUN SERPL-MCNC: 27 MG/DL
BUN SERPL-MCNC: 52 MG/DL
CALCIUM SERPL-MCNC: 10.3 MG/DL
CALCIUM SERPL-MCNC: 10.7 MG/DL
CHLORIDE SERPL-SCNC: 90 MMOL/L
CHLORIDE SERPL-SCNC: 93 MMOL/L
CO2 SERPL-SCNC: 30 MMOL/L
CO2 SERPL-SCNC: 33 MMOL/L
CREAT SERPL-MCNC: 3.38 MG/DL
CREAT SERPL-MCNC: 4.72 MG/DL
EOSINOPHIL # BLD AUTO: 0.15 K/UL
EOSINOPHIL # BLD AUTO: 0.18 K/UL
EOSINOPHIL NFR BLD AUTO: 2.7 %
EOSINOPHIL NFR BLD AUTO: 3.3 %
GLUCOSE SERPL-MCNC: 139 MG/DL
GLUCOSE SERPL-MCNC: 239 MG/DL
HCT VFR BLD CALC: 39.3 %
HCT VFR BLD CALC: 44.9 %
HGB BLD-MCNC: 12.5 G/DL
HGB BLD-MCNC: 13.6 G/DL
IMM GRANULOCYTES NFR BLD AUTO: 0.4 %
IMM GRANULOCYTES NFR BLD AUTO: 0.4 %
LYMPHOCYTES # BLD AUTO: 0.62 K/UL
LYMPHOCYTES # BLD AUTO: 0.78 K/UL
LYMPHOCYTES NFR BLD AUTO: 11.1 %
LYMPHOCYTES NFR BLD AUTO: 14.2 %
MAN DIFF?: NORMAL
MAN DIFF?: NORMAL
MCHC RBC-ENTMCNC: 29.5 PG
MCHC RBC-ENTMCNC: 29.8 PG
MCHC RBC-ENTMCNC: 30.3 GM/DL
MCHC RBC-ENTMCNC: 31.8 GM/DL
MCV RBC AUTO: 93.6 FL
MCV RBC AUTO: 97.4 FL
MONOCYTES # BLD AUTO: 0.7 K/UL
MONOCYTES # BLD AUTO: 0.78 K/UL
MONOCYTES NFR BLD AUTO: 12.5 %
MONOCYTES NFR BLD AUTO: 14.2 %
NEUTROPHILS # BLD AUTO: 3.71 K/UL
NEUTROPHILS # BLD AUTO: 4.06 K/UL
NEUTROPHILS NFR BLD AUTO: 67.2 %
NEUTROPHILS NFR BLD AUTO: 72.6 %
PLATELET # BLD AUTO: 147 K/UL
PLATELET # BLD AUTO: 154 K/UL
POTASSIUM SERPL-SCNC: 4.6 MMOL/L
POTASSIUM SERPL-SCNC: 4.9 MMOL/L
PROT SERPL-MCNC: 7 G/DL
PROT SERPL-MCNC: 7.5 G/DL
RBC # BLD: 4.2 M/UL
RBC # BLD: 4.61 M/UL
RBC # FLD: 13.2 %
RBC # FLD: 15.2 %
SODIUM SERPL-SCNC: 134 MMOL/L
SODIUM SERPL-SCNC: 139 MMOL/L
WBC # FLD AUTO: 5.51 K/UL
WBC # FLD AUTO: 5.59 K/UL

## 2020-12-06 NOTE — DISEASE MANAGEMENT
[Pathological] : TNM Stage: p [III] : III [TTNM] : 3 [NTNM] : 0 [MTNM] : 0 [de-identified] : 853 [de-identified] : 60 Gy [de-identified] : Tongue / neck

## 2020-12-06 NOTE — HISTORY OF PRESENT ILLNESS
[FreeTextEntry1] : 64M with [id number] left oral tongue SCC s/p 9/1/2020 left extended hemiglossectomy, resection left anterior floor of mouth, left neck dissection, and ALT flap (Jenniffer). Temporary trach and PEG were placed. [facility] would not accept patients with tracheostomies, so he was readmitted 9/19-9/21 for decannulation and hemodialysis. Tumor board consensus for post-op RT including contralateral neck.\par \par Surgical path: infiltrating moderately differentiated, keratinizing SCC, 3.5 cm, which extends into underlying skeletal muscle, DOI 2.7 cm, negative margins, 0/80 LNs, no LVI, no PNI.\par \par 9/30 fractions of radiation for tongue cancer completed today. On hydration with Med Onc. Weight stable. On dialysis too.  Blood test will be done weekly

## 2020-12-06 NOTE — DISEASE MANAGEMENT
[Pathological] : TNM Stage: p [III] : III [TTNM] : 3 [NTNM] : 0 [MTNM] : 0 [de-identified] : 206 [de-identified] : 60 Gy [de-identified] : Tongue / neck

## 2020-12-06 NOTE — HISTORY OF PRESENT ILLNESS
[FreeTextEntry1] : 64M with [id number] left oral tongue SCC s/p 9/1/2020 left extended hemiglossectomy, resection left anterior floor of mouth, left neck dissection, and ALT flap (Jenniffer). Temporary trach and PEG were placed. [facility] would not accept patients with tracheostomies, so he was readmitted 9/19-9/21 for decannulation and hemodialysis. Tumor board consensus for post-op RT including contralateral neck.\par \par Surgical path: infiltrating moderately differentiated, keratinizing SCC, 3.5 cm, which extends into underlying skeletal muscle, DOI 2.7 cm, negative margins, 0/80 LNs, no LVI, no PNI.\par \par 4/30 fractions of radiation for tongue cancer completed today. On hydration with Med Onc. Weight stable. On dialysis too.

## 2020-12-06 NOTE — HISTORY OF PRESENT ILLNESS
[FreeTextEntry1] : 64M with [id number] left oral tongue SCC s/p 9/1/2020 left extended hemiglossectomy, resection left anterior floor of mouth, left neck dissection, and ALT flap (Jenniffer). Temporary trach and PEG were placed. [facility] would not accept patients with tracheostomies, so he was readmitted 9/19-9/21 for decannulation and hemodialysis. Tumor board consensus for post-op RT including contralateral neck.\par \par Surgical path: infiltrating moderately differentiated, keratinizing SCC, 3.5 cm, which extends into underlying skeletal muscle, DOI 2.7 cm, negative margins, 0/80 LNs, no LVI, no PNI.\par \par 15/30 fractions of radiation for tongue cancer completed today. On feeding tube. On hydration with Med Onc. Weight stable. On dialysis too.  Blood test will be done weekly

## 2020-12-06 NOTE — REVIEW OF SYSTEMS
[Hoarseness: Grade 0] : Hoarseness: Grade 0 [Alopecia: Grade 0] : Alopecia: Grade 0 [Pruritus: Grade 0] : Pruritus: Grade 0 [Skin Atrophy: Grade 0] : Skin Atrophy: Grade 0 [Skin Hyperpigmentation: Grade 0] : Skin Hyperpigmentation: Grade 0 [Skin Induration: Grade 0] : Skin Induration: Grade 0 [Dermatitis Radiation: Grade 0] : Dermatitis Radiation: Grade 0 [FreeTextEntry6] : feeding tube

## 2020-12-06 NOTE — REVIEW OF SYSTEMS
[Tinnitus - Grade 0] : Tinnitus - Grade 0 [Blurred Vision: Grade 0] : Blurred Vision: Grade 0 [Mucositis Oral: Grade 0] : Mucositis Oral: Grade 0  [Xerostomia: Grade 0] : Xerostomia: Grade 0 [Oral Pain: Grade 0] : Oral Pain: Grade 0 [Salivary duct inflammation: Grade 0] : Salivary duct inflammation: Grade 0 [Dysgeusia: Grade 1- Altered taste but no change in diet] : Dysgeusia: Grade 1 - Altered taste but no change in diet [Hoarseness: Grade 0] : Hoarseness: Grade 0 [Alopecia: Grade 0] : Alopecia: Grade 0 [Pruritus: Grade 0] : Pruritus: Grade 0 [Skin Atrophy: Grade 0] : Skin Atrophy: Grade 0 [Skin Hyperpigmentation: Grade 0] : Skin Hyperpigmentation: Grade 0 [Skin Induration: Grade 0] : Skin Induration: Grade 0 [Dermatitis Radiation: Grade 0] : Dermatitis Radiation: Grade 0 [FreeTextEntry6] : feeding tube [de-identified] : feeding tube

## 2020-12-06 NOTE — DISEASE MANAGEMENT
[Pathological] : TNM Stage: p [III] : III [TTNM] : 3 [NTNM] : 0 [MTNM] : 0 [de-identified] : 549 [de-identified] : 60 Gy [de-identified] : Tongue / neck

## 2020-12-07 LAB — SARS-COV-2 N GENE NPH QL NAA+PROBE: NOT DETECTED

## 2020-12-07 PROCEDURE — 77387B: CUSTOM | Mod: 26

## 2020-12-08 PROCEDURE — 77387B: CUSTOM | Mod: 26

## 2020-12-09 ENCOUNTER — APPOINTMENT (OUTPATIENT)
Dept: INFUSION THERAPY | Facility: HOSPITAL | Age: 65
End: 2020-12-09

## 2020-12-09 ENCOUNTER — APPOINTMENT (OUTPATIENT)
Dept: HEMATOLOGY ONCOLOGY | Facility: CLINIC | Age: 65
End: 2020-12-09
Payer: MEDICARE

## 2020-12-09 VITALS — HEIGHT: 70 IN | WEIGHT: 145 LBS | BODY MASS INDEX: 20.76 KG/M2 | RESPIRATION RATE: 16 BRPM

## 2020-12-09 PROCEDURE — 77387B: CUSTOM | Mod: 26

## 2020-12-09 PROCEDURE — 99214 OFFICE O/P EST MOD 30 MIN: CPT

## 2020-12-09 PROCEDURE — 99072 ADDL SUPL MATRL&STAF TM PHE: CPT

## 2020-12-09 NOTE — HISTORY OF PRESENT ILLNESS
[Disease: _____________________] : Disease: [unfilled] [de-identified] : Mr. Pederson is a pleasant 65M started experiencing tongue pain around March 2020. Saw PCP for this- was given mouthwash which did not help. He saw Dr. Walter who referred him to Dr. Marie for  [id number] left oral tongue SCC s/p 9/1/2020 left extended hemiglossectomy, resection left anterior floor of mouth, left neck dissection, and ALT flap (Jenniffer). Temporary trach (later decannulated) and PEG were placed.64M with [id number] left oral tongue SCC s/p 9/1/2020 left extended hemiglossectomy, resection left anterior floor of mouth, left neck dissection, and ALT flap (Jenniffer). Temporary trach and PEG were placed. [facility] would not accept patients with tracheostomies, so he was readmitted 9/19-9/21 for decannulation and hemodialysis. Tumor board consensus for post-op RT including contralateral neck. Surgical path: infiltrating moderately differentiated, keratinizing SCC, 3.5 cm, which extends into underlying skeletal muscle, DOI 2.7 cm, negative margins, 0/80 LNs, no LVI, no PNI. He is tolerating 6 cans Neprorth/day via PEG. Reports weight gain since surgery.   Of note, pt has been on HD since April of 2020 for HTN-induced renal failure. \par \par   11/11/2020: Pt being seen in the treatment room to establish relationship with patient. He does not require CCRT. He started XRT for SCC of oral tongue. We will be following him for supportive care and hydration. He is a dialysis patient so IVF will not exceed 500cc. Pt gets dialysis Tu, Thur, Sat. He started XRT on 11/5.\par \par   11/25/20: Pt receiving RT only and seems to be doing OK. He is here for hydration.   1   \par \par 12/9/2020. Pt being seen in treatment room during hydration. He was supposed to have peg replaced today in IR but there was some kind of miscommunication and it didn't happen. Pt is very upset but is refusing rescheduling. States he is tolerating liquids. No report of pain. copious thick secretions. Has 2 weeks left of radiation\par  [de-identified] : squamous cell ca

## 2020-12-09 NOTE — HISTORY OF PRESENT ILLNESS
[Disease: _____________________] : Disease: [unfilled] [de-identified] : Mr. Pederson is a pleasant 65M started experiencing tongue pain around March 2020. Saw PCP for this- was given mouthwash which did not help. He saw Dr. Walter who referred him to Dr. Marie for  [id number] left oral tongue SCC s/p 9/1/2020 left extended hemiglossectomy, resection left anterior floor of mouth, left neck dissection, and ALT flap (Jenniffer). Temporary trach (later decannulated) and PEG were placed.64M with [id number] left oral tongue SCC s/p 9/1/2020 left extended hemiglossectomy, resection left anterior floor of mouth, left neck dissection, and ALT flap (Jenniffer). Temporary trach and PEG were placed. [facility] would not accept patients with tracheostomies, so he was readmitted 9/19-9/21 for decannulation and hemodialysis. Tumor board consensus for post-op RT including contralateral neck. Surgical path: infiltrating moderately differentiated, keratinizing SCC, 3.5 cm, which extends into underlying skeletal muscle, DOI 2.7 cm, negative margins, 0/80 LNs, no LVI, no PNI. He is tolerating 6 cans Neprorth/day via PEG. Reports weight gain since surgery. \par \par Of note, pt has been on HD since April of 2020 for HTN-induced renal failure. \par \par 11/11/2020: Pt being seen in the treatment room to establish relationship with patient. He does not require CCRT. He started XRT for SCC of oral tongue. We will be following him for supportive care and hydration. He is a dialysis patient so IVF will not exceed 500cc. Pt gets dialysis Tu, Thur, Sat. He started XRT on 11/5.\par \par 11/25/20: Pt receiving RT only and seems to be doing OK. He is here for hydration. \par \par \par \par \par  [de-identified] : squamous cell ca

## 2020-12-09 NOTE — PHYSICAL EXAM
[Fully active, able to carry on all pre-disease performance without restriction] : Status 0 - Fully active, able to carry on all pre-disease performance without restriction [Normal] : affect appropriate [de-identified] : tongue reconstructed.  [de-identified] : recent surgical incision looks clean, trach site closed- clean, RT changes [de-identified] : donor skin site healed

## 2020-12-09 NOTE — ASSESSMENT
[FreeTextEntry1] : 64 yo m with resected tongue ca receiving XRT with curative intent. Started XRT on 11/5/2020\par Reviewed images and path, margins negative and no ECS\par Receiving RT only and doing well.  \par Continue with hydration as scheduled\par Supportive care\par OV 1 month\par  [Curative] : Goals of care discussed with patient: Curative

## 2020-12-09 NOTE — PHYSICAL EXAM
[Fully active, able to carry on all pre-disease performance without restriction] : Status 0 - Fully active, able to carry on all pre-disease performance without restriction [Normal] : affect appropriate [de-identified] : tongue reconstructed.  [de-identified] : recent surgical incision looks clean, trach site closed- clean, RT changes [de-identified] : donor skin site healed

## 2020-12-09 NOTE — ASSESSMENT
[Curative] : Goals of care discussed with patient: Curative [FreeTextEntry1] : 66 yo m with resected tongue ca receiving XRT with curative intent. Started XRT on 11/5/2020\par Reviewed images and path, margins negative and no ECS\par Receiving RT only and doing well.  \par Continue with hydration as scheduled\par Supportive care\par OV 1 month\par

## 2020-12-10 ENCOUNTER — NON-APPOINTMENT (OUTPATIENT)
Age: 65
End: 2020-12-10

## 2020-12-10 PROCEDURE — 77014: CPT | Mod: 26

## 2020-12-11 ENCOUNTER — OUTPATIENT (OUTPATIENT)
Dept: OUTPATIENT SERVICES | Facility: HOSPITAL | Age: 65
LOS: 1 days | Discharge: HOME | End: 2020-12-11

## 2020-12-11 DIAGNOSIS — Z90.49 ACQUIRED ABSENCE OF OTHER SPECIFIED PARTS OF DIGESTIVE TRACT: Chronic | ICD-10-CM

## 2020-12-11 DIAGNOSIS — I77.0 ARTERIOVENOUS FISTULA, ACQUIRED: Chronic | ICD-10-CM

## 2020-12-11 DIAGNOSIS — C03.9 MALIGNANT NEOPLASM OF GUM, UNSPECIFIED: ICD-10-CM

## 2020-12-11 PROCEDURE — 77387B: CUSTOM | Mod: 26

## 2020-12-11 PROCEDURE — 77427 RADIATION TX MANAGEMENT X5: CPT

## 2020-12-13 NOTE — DISEASE MANAGEMENT
[TTNM] : 3 [NTNM] : 0 [MTNM] : 0 [de-identified] : 759 [de-identified] : 60 Gy [de-identified] : Tongue / neck

## 2020-12-13 NOTE — HISTORY OF PRESENT ILLNESS
[FreeTextEntry1] : 64M with [id number] left oral tongue SCC s/p 9/1/2020 left extended hemiglossectomy, resection left anterior floor of mouth, left neck dissection, and ALT flap (Jenniffer). Temporary trach and PEG were placed. [facility] would not accept patients with tracheostomies, so he was readmitted 9/19-9/21 for decannulation and hemodialysis. Tumor board consensus for post-op RT including contralateral neck.\par \par Surgical path: infiltrating moderately differentiated, keratinizing SCC, 3.5 cm, which extends into underlying skeletal muscle, DOI 2.7 cm, negative margins, 0/80 LNs, no LVI, no PNI.\par \par 18/30 fractions of radiation for tongue cancer completed today. c/o feeding tube fell off when Pt took a shower. No abd pain, SOB, bleeding, fever, bowel movement problems. Will refer to surgeon to put feeding tube back. On hydration with Med Onc. Weight stable. On dialysis too.  Blood test will be done weekly

## 2020-12-14 PROCEDURE — 77387B: CUSTOM | Mod: 26

## 2020-12-15 PROCEDURE — 77387B: CUSTOM | Mod: 26

## 2020-12-16 ENCOUNTER — NON-APPOINTMENT (OUTPATIENT)
Age: 65
End: 2020-12-16

## 2020-12-16 ENCOUNTER — APPOINTMENT (OUTPATIENT)
Dept: INFUSION THERAPY | Facility: HOSPITAL | Age: 65
End: 2020-12-16

## 2020-12-16 VITALS — BODY MASS INDEX: 21.76 KG/M2 | HEIGHT: 70 IN | WEIGHT: 152 LBS

## 2020-12-16 DIAGNOSIS — E86.0 DEHYDRATION: ICD-10-CM

## 2020-12-16 PROCEDURE — 77387B: CUSTOM | Mod: 26

## 2020-12-16 NOTE — VITALS
[Maximal Pain Intensity: 3/10] : 3/10 [Least Pain Intensity: 0/10] : 0/10 [Pain Location: ___] : Pain Location: [unfilled] [Adjuvant (neuroleptics)] : adjuvant (neuroleptics) [80: Normal activity with effort; some signs or symptoms of disease.] : 80: Normal activity with effort; some signs or symptoms of disease.  [ECOG Performance Status: 2 - Ambulatory and capable of all self care but unable to carry out any work activities] : Performance Status: 2 - Ambulatory and capable of all self care but unable to carry out any work activities. Up and about more than 50% of waking hours

## 2020-12-16 NOTE — REVIEW OF SYSTEMS
[Dysphagia: Grade 2 - Symptomatic and altered eating/swallowing] : Dysphagia: Grade 2 - Symptomatic and altered eating/swallowing [Tinnitus - Grade 0] : Tinnitus - Grade 0 [Blurred Vision: Grade 0] : Blurred Vision: Grade 0 [Mucositis Oral: Grade 0] : Mucositis Oral: Grade 0  [Xerostomia: Grade 1 - Symptomatic (e.g., dry or thick saliva) without significant dietary alteration; unstimulated saliva flow >0.2 ml/min] : Xerostomia: Grade 1 - Symptomatic (e.g., dry or thick saliva) without significant dietary alteration; unstimulated saliva flow >0.2 ml/min [Oral Pain: Grade 1 - Mild pain] : Oral Pain: Grade 1 - Mild pain [Salivary duct inflammation: Grade 1 - Slightly thickened saliva; slightly altered taste (e.g., metallic)] : Salivary duct inflammation: Grade 1 - Slightly thickened saliva; slightly altered taste (e.g., metallic) [Dysgeusia: Grade 1- Altered taste but no change in diet] : Dysgeusia: Grade 1 - Altered taste but no change in diet [Hoarseness: Grade 0] : Hoarseness: Grade 0 [Alopecia: Grade 0] : Alopecia: Grade 0 [Pruritus: Grade 0] : Pruritus: Grade 0 [Skin Atrophy: Grade 0] : Skin Atrophy: Grade 0 [Skin Hyperpigmentation: Grade 0] : Skin Hyperpigmentation: Grade 0 [Skin Induration: Grade 0] : Skin Induration: Grade 0 [Dermatitis Radiation: Grade 0] : Dermatitis Radiation: Grade 0

## 2020-12-17 ENCOUNTER — NON-APPOINTMENT (OUTPATIENT)
Age: 65
End: 2020-12-17

## 2020-12-18 PROCEDURE — 77387B: CUSTOM | Mod: 26

## 2020-12-21 PROCEDURE — 77427 RADIATION TX MANAGEMENT X5: CPT

## 2020-12-21 PROCEDURE — 77387B: CUSTOM | Mod: 26

## 2020-12-23 ENCOUNTER — APPOINTMENT (OUTPATIENT)
Dept: HEMATOLOGY ONCOLOGY | Facility: CLINIC | Age: 65
End: 2020-12-23

## 2020-12-23 ENCOUNTER — APPOINTMENT (OUTPATIENT)
Dept: INFUSION THERAPY | Facility: HOSPITAL | Age: 65
End: 2020-12-23

## 2020-12-23 ENCOUNTER — NON-APPOINTMENT (OUTPATIENT)
Age: 65
End: 2020-12-23

## 2020-12-28 ENCOUNTER — APPOINTMENT (OUTPATIENT)
Dept: OTOLARYNGOLOGY | Facility: CLINIC | Age: 65
End: 2020-12-28

## 2021-01-04 NOTE — HISTORY OF PRESENT ILLNESS
[FreeTextEntry1] : 64M with [id number] left oral tongue SCC s/p 9/1/2020 left extended hemiglossectomy, resection left anterior floor of mouth, left neck dissection, and ALT flap (Jenniffer). Temporary trach and PEG were placed. [facility] would not accept patients with tracheostomies, so he was readmitted 9/19-9/21 for decannulation and hemodialysis. Tumor board consensus for post-op RT including contralateral neck.\par \par Surgical path: infiltrating moderately differentiated, keratinizing SCC, 3.5 cm, which extends into underlying skeletal muscle, DOI 2.7 cm, negative margins, 0/80 LNs, no LVI, no PNI.\par \par 28/30 fractions of radiation for tongue cancer completed today. c/o feeding tube fell off when Pt took a shower few weeks ago. No abd pain, SOB, bleeding, fever, bowel movement problems.  On hydration with Med Onc. Weight stable. On dialysis too.  Blood test will be done weekly. Feeding tube was scheduled today, but did not happen because of some kind of miscommunication. Encouraged to get it done ASAP.  Body weight 152 LB with clothes on today in the clinic. Weight 145 LB without clothes as per Pt.

## 2021-01-04 NOTE — DISEASE MANAGEMENT
[Pathological] : TNM Stage: p [III] : III [TTNM] : 3 [NTNM] : 0 [MTNM] : 0 [de-identified] : 690 [de-identified] : 60 Gy [de-identified] : Tongue / neck

## 2021-01-04 NOTE — REVIEW OF SYSTEMS
[Tinnitus - Grade 0] : Tinnitus - Grade 0 [Blurred Vision: Grade 0] : Blurred Vision: Grade 0 [Mucositis Oral: Grade 0] : Mucositis Oral: Grade 0  [Xerostomia: Grade 0] : Xerostomia: Grade 0 [Oral Pain: Grade 0] : Oral Pain: Grade 0 [Salivary duct inflammation: Grade 0] : Salivary duct inflammation: Grade 0 [Dysgeusia: Grade 1- Altered taste but no change in diet] : Dysgeusia: Grade 1 - Altered taste but no change in diet [Hoarseness: Grade 0] : Hoarseness: Grade 0 [Alopecia: Grade 0] : Alopecia: Grade 0 [Pruritus: Grade 0] : Pruritus: Grade 0 [Skin Atrophy: Grade 0] : Skin Atrophy: Grade 0 [Skin Hyperpigmentation: Grade 0] : Skin Hyperpigmentation: Grade 0 [Skin Induration: Grade 0] : Skin Induration: Grade 0 [Dermatitis Radiation: Grade 0] : Dermatitis Radiation: Grade 0 [de-identified] : feeding tube

## 2021-01-04 NOTE — DISEASE MANAGEMENT
[Pathological] : TNM Stage: p [III] : III [TTNM] : 3 [NTNM] : 0 [MTNM] : 0 [de-identified] : 60 Gy [de-identified] : Tongue / neck

## 2021-01-04 NOTE — HISTORY OF PRESENT ILLNESS
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 Temporary trach and PEG were placed. [facility] would not accept patients with tracheostomies, so he was readmitted 9/19-9/21 for decannulation and hemodialysis. Tumor board consensus for post-op RT including contralateral neck.\par \par Surgical path: infiltrating moderately differentiated, keratinizing SCC, 3.5 cm, which extends into underlying skeletal muscle, DOI 2.7 cm, negative margins, 0/80 LNs, no LVI, no PNI.\par \par 23/30 fractions of radiation for tongue cancer completed today. c/o feeding tube fell off when Pt took a shower few weeks ago. No abd pain, SOB, bleeding, fever, bowel movement problems.  On hydration with Med Onc. Weight stable. On dialysis too.  Blood test will be done weekly. Feeding tube was scheduled today, but did not happen because of some kind of miscommunication. Encouraged to get it done ASAP.  Body weight 152 LB with clothes on today in the clinic. Weight 145 LB without clothes as per Pt.

## 2021-01-11 ENCOUNTER — NON-APPOINTMENT (OUTPATIENT)
Age: 66
End: 2021-01-11

## 2021-01-13 ENCOUNTER — APPOINTMENT (OUTPATIENT)
Dept: OTOLARYNGOLOGY | Facility: CLINIC | Age: 66
End: 2021-01-13
Payer: MEDICARE

## 2021-01-13 VITALS
HEART RATE: 99 BPM | TEMPERATURE: 97.8 F | SYSTOLIC BLOOD PRESSURE: 162 MMHG | WEIGHT: 153 LBS | BODY MASS INDEX: 21.9 KG/M2 | HEIGHT: 70 IN | OXYGEN SATURATION: 98 % | DIASTOLIC BLOOD PRESSURE: 92 MMHG

## 2021-01-13 PROCEDURE — 99214 OFFICE O/P EST MOD 30 MIN: CPT

## 2021-01-13 PROCEDURE — 99072 ADDL SUPL MATRL&STAF TM PHE: CPT

## 2021-01-20 NOTE — PHYSICAL EXAM
[Normal] : orientation to person, place, and time: normal [de-identified] : mild lymphedema, healed neck dissection scar [de-identified] : healthy lobulated ALT flap, tongue is mobile, no dehiscence, no bleeding, no ulceration, no new masses or lesions

## 2021-01-20 NOTE — HISTORY OF PRESENT ILLNESS
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 Temporary trach and PEG were placed. [facility] would not accept patients with tracheostomies, so he was readmitted 9/19-9/21 for decannulation and hemodialysis. He completed RT in December 2020 with Parashar.\par \par Surgical path: infiltrating moderately differentiated, keratinizing SCC, 3.5 cm, which extends into underlying skeletal muscle, DOI 2.7 cm, negative margins, 0/80 LNs, no LVI, no PNI.\par \par His PEG fell out mid-November and he did not want it replaced.  He is tolerating 6 cans Neprorth, soups, smoothies. Reports weight gain since surgery. Denies pain, fever, chills, dyspnea, trismus.  His speech is mostly intelligible.\par

## 2021-01-20 NOTE — ASSESSMENT
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 ALEXA/NER on exam.\par \par - Referred for lymphedema therapy.\par - Seen with SLP today.\par - MBS for swallow evaluation.\par - post-treatment PET/CT in February for treatment response and surveillance of residual/recurrent disease.\par - Continue f/u with Jenniffer and Parashar.\par - Will f/u with pt with results.\par - Call/RTC should any worrisome symptoms develop.\par - Pt verbalized understanding of above. \par

## 2021-01-20 NOTE — REASON FOR VISIT
[Subsequent Evaluation] : a subsequent evaluation for [Spouse] : spouse [FreeTextEntry2] : tongue cancer

## 2021-01-25 ENCOUNTER — RESULT REVIEW (OUTPATIENT)
Age: 66
End: 2021-01-25

## 2021-01-25 ENCOUNTER — OUTPATIENT (OUTPATIENT)
Dept: OUTPATIENT SERVICES | Facility: HOSPITAL | Age: 66
LOS: 1 days | End: 2021-01-25
Payer: MEDICARE

## 2021-01-25 DIAGNOSIS — Z90.49 ACQUIRED ABSENCE OF OTHER SPECIFIED PARTS OF DIGESTIVE TRACT: Chronic | ICD-10-CM

## 2021-01-25 DIAGNOSIS — I77.0 ARTERIOVENOUS FISTULA, ACQUIRED: Chronic | ICD-10-CM

## 2021-01-25 LAB — GLUCOSE BLDC GLUCOMTR-MCNC: 139 MG/DL — HIGH (ref 70–99)

## 2021-01-25 PROCEDURE — 82962 GLUCOSE BLOOD TEST: CPT

## 2021-01-25 PROCEDURE — 78815 PET IMAGE W/CT SKULL-THIGH: CPT

## 2021-01-25 PROCEDURE — 78815 PET IMAGE W/CT SKULL-THIGH: CPT | Mod: 26

## 2021-01-25 PROCEDURE — A9552: CPT

## 2021-01-26 ENCOUNTER — APPOINTMENT (OUTPATIENT)
Dept: RADIATION ONCOLOGY | Facility: CLINIC | Age: 66
End: 2021-01-26
Payer: MEDICARE

## 2021-01-26 PROCEDURE — 99442: CPT

## 2021-01-26 NOTE — REVIEW OF SYSTEMS
[Anal Pain: Grade 0] : Anal Pain: Grade 0 [Constipation: Grade 0] : Constipation: Grade 0 [Diarrhea: Grade 0] : Diarrhea: Grade 0 [Dyspepsia: Grade 0] : Dyspepsia: Grade 0 [Dysphagia: Grade 1 - Symptomatic, able to eat regular diet] : Dysphagia: Grade 1 - Symptomatic, able to eat regular diet [Esophagitis: Grade 0] : Esophagitis: Grade 0 [Fecal Incontinence: Grade 0] : Fecal Incontinence: Grade 0 [Gastroparesis: Grade 0] : Gastroparesis: Grade 0 [Nausea: Grade 0] : Nausea: Grade 0 [Proctitis: Grade 0] : Proctitis: Grade 0 [Rectal Pain: Grade 0] : Rectal Pain: Grade 0 [Small Intestinal Obstruction: Grade 0] : Small Intestinal Obstruction: Grade 0 [Vomiting: Grade 0] : Vomiting: Grade 0 [Tinnitus - Grade 0] : Tinnitus - Grade 0 [Blurred Vision: Grade 0] : Blurred Vision: Grade 0 [Mucositis Oral: Grade 0] : Mucositis Oral: Grade 0  [Xerostomia: Grade 1 - Symptomatic (e.g., dry or thick saliva) without significant dietary alteration; unstimulated saliva flow >0.2 ml/min] : Xerostomia: Grade 1 - Symptomatic (e.g., dry or thick saliva) without significant dietary alteration; unstimulated saliva flow >0.2 ml/min [Oral Pain: Grade 1 - Mild pain] : Oral Pain: Grade 1 - Mild pain [Salivary duct inflammation: Grade 1 - Slightly thickened saliva; slightly altered taste (e.g., metallic)] : Salivary duct inflammation: Grade 1 - Slightly thickened saliva; slightly altered taste (e.g., metallic) [Dysgeusia: Grade 1- Altered taste but no change in diet] : Dysgeusia: Grade 1 - Altered taste but no change in diet [Alopecia: Grade 0] : Alopecia: Grade 0 [Pruritus: Grade 0] : Pruritus: Grade 0 [Skin Atrophy: Grade 0] : Skin Atrophy: Grade 0 [Skin Hyperpigmentation: Grade 1 - Hyperpigmentation covering <10% BSA; no psychosocial impact] : Skin Hyperpigmentation: Grade 1 - Hyperpigmentation covering <10% BSA; no psychosocial impact [Skin Induration: Grade 0] : Skin Induration: Grade 0 [Dermatitis Radiation: Grade 1 - Faint erythema or dry desquamation] : Dermatitis Radiation: Grade 1 - Faint erythema or dry desquamation

## 2021-01-26 NOTE — REASON FOR VISIT
[Home] : at home, [unfilled] , at the time of the visit. [Medical Office: ([facility])___] : at the medical office located in  [Spouse] : spouse [Verbal consent obtained from patient] : the patient, [unfilled] [Post-Treatment Evaluation] : post-treatment evaluation for [Head and Neck Cancer] : head and neck cancer

## 2021-01-26 NOTE — VITALS
[Maximal Pain Intensity: 2/10] : 2/10 [Least Pain Intensity: 0/10] : 0/10 [Pain Description/Quality: ___] : Pain description/quality: [unfilled] [Pain Duration: ___] : Pain duration: [unfilled] [Pain Location: ___] : Pain Location: [unfilled]

## 2021-01-30 NOTE — PATIENT PROFILE ADULT - NSPROGENSOURCEINFO_GEN_A_NUR
SUBJECTIVE:  HPI (per nursing notes) reviewed, confirmed with patient/parent.  Brought in by mother for f/u croup, bronchiolitis, BOM diagnoses from earlier this week.    Note Kve was diagnosed with croup on 1/27/2021 and received decadron PO. His barky cough improved with this medication.     Mom noted a blue spell on 1/28/2021 late afternoon. Kev was laying supine on the bed while mom was taking off her coat. She noticed that his hands and feet were purple in color. His face was not cyanotic, but eyes were very large/looked fearful. Mom describes that purple discoloration was starting to move 1/2 way up lower leg. He seemed to be working hard to breathe. She picked him up and gave him a pacifier; she notes that he quickly returned to normal pink color.    MD spoke with mom re: this event and advised for evaluation. He was evaluated at [facility] on 1/28/2021 due to this blue spell. Dx ROM, nasal congestion. He required NP suctioning in the office. He continued to have abnormal sounds in the right lung field post suctioning but no obvious crackles; parents were educated on s/sx concerning for pneumonia. Acrocyanosis was thought to be due to mucus plugging vs vasomotor instability.     Since this evaluation on 1/28/2021, Kev has improved. He remains afebrile. No further cyanotic episodes. He is eating well with normal UOP/stools. He is tolerating amoxicilin well. He has been playful, smiling.    Mom has questions about starting solids for Kev. He is taking EBM 5 oz q 2.5 hours. He was noted to have a drift in growth percentiles at 2-4 mo WCE, but currently weight, length, HC are improving.  To bed 7pm, sleeps usually until 3-4am. Eats, then sleeps utnil 7 am.    Current Outpatient Medications   Medication Sig Dispense Refill   • amoxicillin (AMOXIL) 400 MG/5ML suspension Take 280 mg by mouth.       No current facility-administered medications for this visit.       ALLERGIES:  No Known Allergies     Past  Medical History:   Diagnosis Date   • Murmur     Consistent with PPS. Noted at 2 week WCE   • Slow weight gain of          PHYSICAL EXAM   Temperature 98.3 °F (36.8 °C), temperature source Rectal, resp. rate 48, weight 6.027 kg.    General: Well appearing male, no acute distress  Derm: No lesions or rashes noted  Head: NCAT, AFOSF  HEENT: PERRL, EOMI, no conjunctival injection.  No scleral icterus. Oropharynx with moist mucous membranes,.   Right TM- hazy effusion, no erythema, no light reflex   Left TM - slightly thick effusion, no erythema, no light reflex  Neck: Supple  Lungs: Clear to auscultation.  No wheezes, rales, rhonchi.  Normal work of breathing. Occasional upper airway transmitted noise that clears.  Cardiac: Regular rate and rhythm, normal S1S2. Very quiet I/VI SATNAM LLSB, low pitched. This radiates to left axilla.   Abdomen: Soft, nontender, nondistended.  Normoactive bowel sounds.  No hepatosplenomegaly or masses.  : Robert I male with testes descended bilaterally  Extremities: Full ROM UE/LE.  Warm, well perfused.   Neuro: CN 2-12 grossly intact     ASSESSMENT:  1. Croup, resolving  2. ROM, under treatment, resolving  3. Bronchiolitis by history  4. Murmur, consistent with PPS.  5. History of blue spell hands/feet thought to be secondary to mucus plug. Unlikely to have been a TET spell as murmur not consistent with pulmonary stenosis/TOF      PLAN:  --Nasal saline, Nose Salma  --Complete amoxicillin  --Discussed warning signs of respiratory distress and when to seek further care.   --Pediatric Cardiology evaluation. Unlikely to have been a TET spell as murmur not consistent with pulmonary stenosis/TOF  --Introduction of solids discussed and handouts provided   patient

## 2021-02-02 NOTE — HISTORY OF PRESENT ILLNESS
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 Temporary trach and PEG were placed. [facility] would not accept patients with tracheostomies, so he was readmitted 9/19-9/21 for decannulation and hemodialysis. Tumor board consensus for post-op RT including contralateral neck.\par \par Surgical path: infiltrating moderately differentiated, keratinizing SCC, 3.5 cm, which extends into underlying skeletal muscle, DOI 2.7 cm, negative margins, 0/80 LNs, no LVI, no PNI.\par \par He completed 60 Gy / 30 Fx of radiation in 12/2020. Saw ENT service 2 wks ago. His PEG tube in 11/2020 and did not want it replaced during radiation treatment course. All side effects are getting better.

## 2021-02-24 ENCOUNTER — OUTPATIENT (OUTPATIENT)
Dept: OUTPATIENT SERVICES | Facility: HOSPITAL | Age: 66
LOS: 1 days | End: 2021-02-24
Payer: MEDICARE

## 2021-02-24 ENCOUNTER — RESULT REVIEW (OUTPATIENT)
Age: 66
End: 2021-02-24

## 2021-02-24 ENCOUNTER — APPOINTMENT (OUTPATIENT)
Dept: RADIOLOGY | Facility: HOSPITAL | Age: 66
End: 2021-02-24
Payer: MEDICARE

## 2021-02-24 DIAGNOSIS — Z90.49 ACQUIRED ABSENCE OF OTHER SPECIFIED PARTS OF DIGESTIVE TRACT: Chronic | ICD-10-CM

## 2021-02-24 DIAGNOSIS — I77.0 ARTERIOVENOUS FISTULA, ACQUIRED: Chronic | ICD-10-CM

## 2021-02-24 PROCEDURE — 74230 X-RAY XM SWLNG FUNCJ C+: CPT | Mod: 26

## 2021-02-24 PROCEDURE — 74230 X-RAY XM SWLNG FUNCJ C+: CPT

## 2021-02-24 PROCEDURE — 92611 MOTION FLUOROSCOPY/SWALLOW: CPT | Mod: GN

## 2021-03-09 NOTE — PROGRESS NOTE ADULT - PROBLEM SELECTOR PLAN 4
Patient notified via [facility] message.   SBP 150s  -C/w home meds, amlodipine 10mg, Hydralazine 100mg TID, Coreg 25mg BID, and Clonidine .1 BID w/holding parameters  -per nephrology, switching Lasix to PO  -Monitor BP and adjust meds as needed

## 2021-03-24 ENCOUNTER — APPOINTMENT (OUTPATIENT)
Dept: OTOLARYNGOLOGY | Facility: CLINIC | Age: 66
End: 2021-03-24
Payer: MEDICARE

## 2021-03-24 VITALS
HEART RATE: 100 BPM | HEIGHT: 70 IN | BODY MASS INDEX: 21.47 KG/M2 | TEMPERATURE: 97.8 F | DIASTOLIC BLOOD PRESSURE: 80 MMHG | WEIGHT: 150 LBS | SYSTOLIC BLOOD PRESSURE: 145 MMHG | OXYGEN SATURATION: 98 %

## 2021-03-24 PROCEDURE — 99072 ADDL SUPL MATRL&STAF TM PHE: CPT

## 2021-03-24 PROCEDURE — 99213 OFFICE O/P EST LOW 20 MIN: CPT

## 2021-03-29 ENCOUNTER — NON-APPOINTMENT (OUTPATIENT)
Age: 66
End: 2021-03-29

## 2021-04-09 ENCOUNTER — APPOINTMENT (OUTPATIENT)
Dept: RADIATION ONCOLOGY | Facility: CLINIC | Age: 66
End: 2021-04-09
Payer: MEDICARE

## 2021-04-09 VITALS
WEIGHT: 158.07 LBS | BODY MASS INDEX: 22.63 KG/M2 | HEIGHT: 70 IN | RESPIRATION RATE: 17 BRPM | HEART RATE: 89 BPM | OXYGEN SATURATION: 100 % | DIASTOLIC BLOOD PRESSURE: 79 MMHG | SYSTOLIC BLOOD PRESSURE: 185 MMHG | TEMPERATURE: 97.9 F

## 2021-04-09 PROCEDURE — 99214 OFFICE O/P EST MOD 30 MIN: CPT

## 2021-04-09 PROCEDURE — 99072 ADDL SUPL MATRL&STAF TM PHE: CPT

## 2021-04-09 NOTE — REVIEW OF SYSTEMS
[Anal Pain: Grade 0] : Anal Pain: Grade 0 [Constipation: Grade 0] : Constipation: Grade 0 [Diarrhea: Grade 0] : Diarrhea: Grade 0 [Dyspepsia: Grade 0] : Dyspepsia: Grade 0 [Dysphagia: Grade 1 - Symptomatic, able to eat regular diet] : Dysphagia: Grade 1 - Symptomatic, able to eat regular diet [Esophagitis: Grade 0] : Esophagitis: Grade 0 [Fecal Incontinence: Grade 0] : Fecal Incontinence: Grade 0 [Gastroparesis: Grade 0] : Gastroparesis: Grade 0 [Nausea: Grade 0] : Nausea: Grade 0 [Proctitis: Grade 0] : Proctitis: Grade 0 [Rectal Pain: Grade 0] : Rectal Pain: Grade 0 [Small Intestinal Obstruction: Grade 0] : Small Intestinal Obstruction: Grade 0 [Vomiting: Grade 0] : Vomiting: Grade 0 [Tinnitus - Grade 0] : Tinnitus - Grade 0 [Blurred Vision: Grade 0] : Blurred Vision: Grade 0 [Mucositis Oral: Grade 0] : Mucositis Oral: Grade 0  [Xerostomia: Grade 1 - Symptomatic (e.g., dry or thick saliva) without significant dietary alteration; unstimulated saliva flow >0.2 ml/min] : Xerostomia: Grade 1 - Symptomatic (e.g., dry or thick saliva) without significant dietary alteration; unstimulated saliva flow >0.2 ml/min [Oral Pain: Grade 1 - Mild pain] : Oral Pain: Grade 1 - Mild pain [Salivary duct inflammation: Grade 1 - Slightly thickened saliva; slightly altered taste (e.g., metallic)] : Salivary duct inflammation: Grade 1 - Slightly thickened saliva; slightly altered taste (e.g., metallic) [Alopecia: Grade 0] : Alopecia: Grade 0 [Pruritus: Grade 0] : Pruritus: Grade 0 [Skin Atrophy: Grade 0] : Skin Atrophy: Grade 0 [Skin Hyperpigmentation: Grade 1 - Hyperpigmentation covering <10% BSA; no psychosocial impact] : Skin Hyperpigmentation: Grade 1 - Hyperpigmentation covering <10% BSA; no psychosocial impact [Skin Induration: Grade 0] : Skin Induration: Grade 0 [Dermatitis Radiation: Grade 1 - Faint erythema or dry desquamation] : Dermatitis Radiation: Grade 1 - Faint erythema or dry desquamation [Dysgeusia: Grade 0] : Dysgeusia: Grade 0

## 2021-04-09 NOTE — VITALS
[Maximal Pain Intensity: 2/10] : 2/10 [Least Pain Intensity: 0/10] : 0/10 [Pain Description/Quality: ___] : Pain description/quality: [unfilled] [Pain Duration: ___] : Pain duration: [unfilled] [Pain Location: ___] : Pain Location: [unfilled] [80: Normal activity with effort; some signs or symptoms of disease.] : 80: Normal activity with effort; some signs or symptoms of disease.  [ECOG Performance Status: 1 - Restricted in physically strenuous activity but ambulatory and able to carry out work of a light or sedentary nature] : Performance Status: 1 - Restricted in physically strenuous activity but ambulatory and able to carry out work of a light or sedentary nature, e.g., light house work, office work [NoTreatment Scheduled] : no treatment scheduled

## 2021-04-13 NOTE — HISTORY OF PRESENT ILLNESS
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 Temporary trach and PEG were placed. [facility] would not accept patients with tracheostomies, so he was readmitted 9/19-9/21 for decannulation and hemodialysis. Tumor board consensus for post-op RT including contralateral neck.\par \par Surgical path: infiltrating moderately differentiated, keratinizing SCC, 3.5 cm, which extends into underlying skeletal muscle, DOI 2.7 cm, negative margins, 0/80 LNs, no LVI, no PNI.\par \par He completed 60 Gy / 30 Fx of radiation in 12/2020.  His PEG tube fell out in 11/2020 and did not want it replaced during radiation treatment course. \par \par PET scan in 1/2021 Impression:  Focal FDG avidity anterior to the midline mandible, left anterior aspect of the tongue without CT correlation. Findings can be nonspecific inflammatory findings. Clinical correlation is recommended.\par \par Today in clinic. c/o mild pain over left mouth/neck. Eating soft foods and gaining weight. Encouraged to continue f/u with ENT service. Will refer to PMR for left neck pain

## 2021-04-14 NOTE — PHYSICAL EXAM
[Normal] : orientation to person, place, and time: normal [de-identified] : mild lymphedema, healed neck dissection scar [de-identified] : healthy lobulated ALT flap, tongue is mobile, no dehiscence, no bleeding, no ulceration, no new masses or lesions

## 2021-04-14 NOTE — REASON FOR VISIT
[Subsequent Evaluation] : a subsequent evaluation for [Spouse] : spouse [FreeTextEntry2] : surveillance of tongue cancer

## 2021-04-14 NOTE — ASSESSMENT
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 ALEXA/NER on exam.\par \par - Seen with SLP today.\par - f/u Parashar.\par - f/u in 3 months.\par - Pt interested in renal transplant - advised him there are specific criteria for transplant; he has to talk to his nephrologist.\par - His wife expressed that he feels down at times from the dialysis and cancer - referred him to our online support group run by [employer].\par - Call/RTC sooner should any worrisome symptoms develop.\par - Pt verbalized understanding of above. \par

## 2021-04-14 NOTE — HISTORY OF PRESENT ILLNESS
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 Temporary trach and PEG were placed. [facility] would not accept patients with tracheostomies, so he was readmitted 9/19-9/21 for decannulation and hemodialysis. He completed RT in December 2020 with Parashar.\par \par Surgical path: infiltrating moderately differentiated, keratinizing SCC, 3.5 cm, which extends into underlying skeletal muscle, DOI 2.7 cm, negative margins, 0/80 LNs, no LVI, no PNI.\par \par His PEG fell out mid-November and he did not want it replaced. He is tolerating 6 cans Neprorth, soups, smoothies. Reports weight gain since surgery. Denies pain, fever, chills, dyspnea, trismus. His speech is mostly intelligible.\par \par 1/25/21 PET/CT: focal FDG avidity anterior to midline mandible, left anterior aspect of tongue without CT correlation, can be nonspecific inflammatory findings.\par \par 2/24/21 MBS: moderate oral and mild pharyngeal dysphagia, recommendation: smooth runny puree and thin liquids, small sips, head tilt.\par \par He had several lymphedema sessions, then stopped.\par \par Pt returns for surveillance, stable.

## 2021-04-14 NOTE — ADDENDUM
[FreeTextEntry1] : Speech Pathology:\par \par Pt seen today in conjunction with Dr. 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 Temporary trach and PEG placed.  Pt was readmitted 9/19-9/21 for decannulation and hemodialysis. He completed RT in December 2020.\par \par Pt was seen for dysphagia therapy at [facility] in 2020.  During his follow-up visit with Dr. Marie on 1/13/21, pt has been on a full liquid diet.  PEG was not replaced after it was dislodged in Nov 2020.  Pt noted to have soft signs of airway protection deficits at that visit and a repeat MBSS was recommended.  MBSS completed in Feb 2021 and pt recommended a thinned puree diet with thin liquids.  \par \par During today,s visit, pt reported that he has been on a pureed diet with thin liquid diet and has been drinking 5 Nepro per day since MBSS in February.  His current weigh is 150 lbs, which is mildly improved from previous months.  He demonstrated moderate dysarthria with reduced articulatory precision but with fair intelligibility.  Pt trialed on thin liquid, puree, and moist solid consistencies today.  (+) partially-increased oral processing/transport as well as mild to moderate oral residue with moist solid consistency, cleared with liquid wash.  No overt s/sx of aspiration noted across trials.\par \par Pt recommended to slowly transition to a mechanical soft diet with thin liquids and to try to eat/drink more "real" foods instead of relying on Nepro supplement.  Pt also instructed to continue performing head/neck stretching and swallow exercises he was instructed on in previous visits.  Pt agreed.\par \par Mario Owen MS, CCC-SLP, BCS-S\par Supervisor, Speech Pathology

## 2021-04-21 ENCOUNTER — APPOINTMENT (OUTPATIENT)
Dept: PHYSICAL MEDICINE AND REHAB | Facility: CLINIC | Age: 66
End: 2021-04-21
Payer: MEDICARE

## 2021-04-21 PROCEDURE — 99205 OFFICE O/P NEW HI 60 MIN: CPT

## 2021-04-21 PROCEDURE — 99072 ADDL SUPL MATRL&STAF TM PHE: CPT

## 2021-04-21 NOTE — ASSESSMENT
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 \par - Follow up in 6 weeks. \par

## 2021-04-21 NOTE — HISTORY OF PRESENT ILLNESS
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 Temporary trach and PEG were placed. 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 His PEG tube fell out in 11/2020 and did not want it replaced during radiation treatment course. \par \par PET scan in 1/2021 Impression: Focal FDG avidity anterior to the midline mandible, left anterior aspect of the tongue without CT correlation. Findings can be nonspecific inflammatory findings. Clinical correlation is recommended.\par \par Patient presents to the clinic with firmness of his jaw. Patient with left sided lump on the neck since his RT and follow sup with ENT Dr. Marie. States that the lump gets better after dialysis which he has been getting for the past year. Patient finished with speech therapy 11/2020. Current tolerating PO diet but does not have solid food. Diet consist of ensure and other shakes. Endorses to increasing weight. Patient denies neck pain. No fevers, chills, night sweats. \par \par

## 2021-04-21 NOTE — PHYSICAL EXAM
[FreeTextEntry1] : \par GEN: AAOx3, NAD\par HEENT:  + submandibular lymphedema measuring 11cm across, mouth opening > than 3.5cm \par PSYCH: Normal mood and affect. Responds appropriately to commands \par EYES: Sclerae Anicteric. No discharge. EOMI.\par RESP: Breathing unlabored \par CV: DP pulses 2+ and equal. No varicosities noted \par EXT: No C/C/E\par SKIN: No lesions noted \par STRENGTH: 5/5 for bilateral UE and LE \par TONE: Normal, No clonus.\par PALP: No tenderness on cervical paraspinals\par cervical ROM intact\par \par \par

## 2021-05-17 ENCOUNTER — APPOINTMENT (OUTPATIENT)
Dept: TRANSPLANT | Facility: CLINIC | Age: 66
End: 2021-05-17
Payer: MEDICARE

## 2021-05-17 PROCEDURE — 99001T: CUSTOM

## 2021-05-29 NOTE — ED PROVIDER NOTE - NS_EDPROVIDERDISPOUSERTYPE_ED_A_ED
[facility]  Exam    ASSESSMENT & PLAN  Julio Howell is a 9 days who has normal growth and normal development.    Diagnoses and all orders for this visit:    Health supervision for  under 8 days old        Vitamin D discussed, Lactation Referral and Return to clinic at 2 months or sooner as needed.    ANTICIPATORY GUIDANCE  I have reviewed age appropriate anticipatory guidance.    HEALTH HISTORY   Do you have any concerns that you'd like to discuss today?: No concerns       Roomed by: Tanice    Accompanied by Parents sister   Refills needed? No    Do you have any forms that need to be filled out? No        Do you have any significant health concerns in your family history?: No  Family History   Problem Relation Age of Onset     No Medical Problems Brother         Copied from mother's family history at birth     No Medical Problems Sister         Copied from mother's family history at birth     No Medical Problems Maternal Grandmother         Copied from mother's family history at birth     No Medical Problems Maternal Grandfather         Copied from mother's family history at birth     Has a lack of transportation kept you from medical appointments?: No    Who lives in your home?:  Parents siblings and auntt  Social History     Social History Narrative    Parents: Adriana Howell.    Siblings: Nicholas 2013, Lakeisha 2014, Francisco 2017.     Do you have any concerns about losing your housing?: No  Is your housing safe and comfortable?: Yes    Maternal depression screening: Doing well    Does your child eat:  Formula: similac advance   2 oz every 4 hours  Is your child spitting up?: No  Have you been worried that you don't have enough food?: No    Sleep:  How many times does your child wake in the night?: 2-3   In what position does your baby sleep:  back  Where does your baby sleep?:  bassinet    Elimination:  Do you have any concerns with your child's bowels or bladder (peeing, pooping,  "constipation?):  No  How many dirty diapers does your child have a day?:  3  How many wet diapers does your child have a day?:  3-5    TB Risk Assessment:  The patient and/or parent/guardian answer positive to:  parents born outside of the [place]    DEVELOPMENT  Do parents have any concerns regarding development?  No  Do parents have any concerns regarding hearing?  No  Do parents have any concerns regarding vision?  No     SCREENING RESULTS:  [place] Hearing Screen:   Hearing Screening Results - Right Ear: Pass   Hearing Screening Results - Left Ear: Pass     CCHD Screen:   Right upper extremity -  Oxygen Saturation in Blood Preductal by Pulse Oximetry: 99 %   Lower extremity -  Oxygen Saturation in Blood Postductal by Pulse Oximetry: 99 %   CCHD Interpretation - pass     Transcutaneous Bilirubin:   Transcutaneous Bili: 10 (CAMERON BRENNAN ) (2019  6:00 AM)     Metabolic Screen:   Has the initial  metabolic screen been completed?: Yes     Screening Results     [place] metabolic       Hearing         Patient Active Problem List   Diagnosis     Term , current hospitalization     Group B Streptococcus exposure with inadequate intrapartum antibiotic prophylaxis         MEASUREMENTS    Length:  20\" (50.8 cm) (66 %, Z= 0.40, Source: WHO (Girls, 0-2 years))  Weight: 7 lb 8 oz (3.402 kg) (48 %, Z= -0.04, Source: WHO (Girls, 0-2 years))  Birth Weight Change:  -1%  OFC: 34.3 cm (13.5\") (46 %, Z= -0.10, Source: WHO (Girls, 0-2 years))   7 lb 9.3 oz (3.44 kg)      Birth History     Birth     Length: 20.5\" (52.1 cm)     Weight: 7 lb 9.3 oz (3.44 kg)     HC 33.7 cm (13.25\")     Apgar     One: 8     Five: 9     Delivery Method: Vaginal, Spontaneous     Gestation Age: 40 2/7 wks     Duration of Labor: 1st: 2h 59m / 2nd: 1m     Pregnancy: uncomplicated. Mom carrier of GBS  Labor + delivery:  at 40w2d. Got one dose intrapartum antibiotics before delivery.  : mild jaundice, no treatment needed. "       PHYSICAL EXAM  General Appearance: Healthy-appearing, vigorous infant.  Head: Atraumatic. Normal sutures and fontanelle.  Eyes: Sclerae white, red reflex symmetric bilaterally  Ears: Normal position and pinnae; no ear pits  Nose: Clear, normal mucosa   Throat: Lips, tongue, and mucosa are moist, pink and intact; palate intact   Neck: Supple, symmetrical; no sinus tracts or pits  Chest: Lungs clear to auscultation, no increased work of breathing  Heart: Regular rate & rhythm, normal S1 and S2, no murmurs, rubs, or gallops   Abdomen: Soft, non-tender/non-distended, no masses or organomegaly.  Bowel sounds present. Umbilical stump clean/dry  Pulses: Strong symmetric femoral pulses, brisk capillary refill   Hips: Negative Martins & Ortolani, gluteal creases equal   : Normal female genitalia   Extremities: Well-perfused, warm and dry; all digits present; no crepitus over clavicles  Neuro: Symmetric tone and strength; positive root and suck; symmetric normal reflexes  Skin:Jaundice not observed.No rashes. Lots of peeling skin.  Back: Normal; spine without dimples or claudine     Attending Attestation (For Attendings USE Only)...

## 2021-06-02 ENCOUNTER — APPOINTMENT (OUTPATIENT)
Dept: PHYSICAL MEDICINE AND REHAB | Facility: CLINIC | Age: 66
End: 2021-06-02
Payer: MEDICARE

## 2021-06-02 PROCEDURE — 99072 ADDL SUPL MATRL&STAF TM PHE: CPT

## 2021-06-02 PROCEDURE — 99214 OFFICE O/P EST MOD 30 MIN: CPT

## 2021-06-02 NOTE — ASSESSMENT
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  Patient is on liquid/pureed diet but may have improved swallow function after lymphedema therapy\par Patient has normal mouth opening, will monitor as he does not wish to attend more appointments at this time. Will consider SLP after lymphedema therapy completed. \par - Follow up in 4 weeks. \par

## 2021-06-02 NOTE — HISTORY OF PRESENT ILLNESS
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  Patient has been attending lymphedema therapy and reports 40% improvement in swelling.  Patient is accompanied by his wife today who reports that after treatment, neck swelling is almost completely gone but then swelling returns and fluctuates throughout the day.  Patient reports swelling is softer, more mobile, non painful.  He is ordering compression garment. \par \par He drinks nepro, smoothies, juices, states his weight is stable.  \par \par

## 2021-06-02 NOTE — PHYSICAL EXAM
[FreeTextEntry1] : GEN: AAOx3, NAD\par HEENT: + submandibular lymphedema improved since last visit, now 3 small discrete areas\par   mouth opening 35mm\par PSYCH: Normal mood and affect. Responds appropriately to commands \par EYES: Sclerae Anicteric. No discharge. EOMI.\par RESP: Breathing unlabored \par CV: DP pulses 2+ and equal. No varicosities noted \par EXT: No C/C/E\par SKIN: No lesions noted \par STRENGTH: 5/5 for bilateral UE and LE \par TONE: Normal, No clonus.\par PALP: No tenderness on cervical paraspinals\par cervical ROM intact\par \par

## 2021-06-22 ENCOUNTER — APPOINTMENT (OUTPATIENT)
Dept: TRANSPLANT | Facility: CLINIC | Age: 66
End: 2021-06-22
Payer: MEDICARE

## 2021-06-22 PROCEDURE — 99001T: CUSTOM

## 2021-07-07 ENCOUNTER — APPOINTMENT (OUTPATIENT)
Dept: PHYSICAL MEDICINE AND REHAB | Facility: CLINIC | Age: 66
End: 2021-07-07
Payer: MEDICARE

## 2021-07-07 VITALS
BODY MASS INDEX: 22.49 KG/M2 | HEART RATE: 101 BPM | OXYGEN SATURATION: 98 % | DIASTOLIC BLOOD PRESSURE: 81 MMHG | SYSTOLIC BLOOD PRESSURE: 155 MMHG | RESPIRATION RATE: 16 BRPM | WEIGHT: 156.75 LBS

## 2021-07-07 PROCEDURE — 99214 OFFICE O/P EST MOD 30 MIN: CPT

## 2021-07-07 NOTE — HISTORY OF PRESENT ILLNESS
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 He was last seen on 6/2/21 at which time he was to continue lymphedema therapy and was ordering compression garment.   He states he now has garment and wears it about 2 hours at night.  Overall he reports about 70% improvement in swelling. Denies pain.  He is interested in pneumatic pump. \par \par Patient reports improvement in swallowing, states he is seeing Dr. Marie on Monday. \par He drinks nepro, smoothies, juices.  weight is stable 156 lb 12 oz today. \par \par \par He saw his dentist recently and has regular follow up. \par He does not want to see SLP at this time but will discuss it with Dr. Marie. 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

## 2021-07-07 NOTE — ASSESSMENT
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 will refer for flexitouch as patient still has residual lymphedema after lymphedema therapy program and compression.\par -recommended to return to SLP for speech and swallow therapy/evaluation, also for oral stretching program- patient states he does not wish to go to slp at this time but will discuss with Dr. Marie.\par Patient is on liquid/pureed diet but reports improved swallow function after lymphedema therapy\par Patient has normal mouth opening. continue to monitor, may benefit from therabite if agreeable, recommend oral stretching to maintain mouth opening\par - Follow up in 6 weeks. \par  \par \par

## 2021-07-07 NOTE — PHYSICAL EXAM
[FreeTextEntry1] : GEN: AAOx3, NAD\par HEENT: + submandibular lymphedema improving however with firm \par  mouth opening 35mm\par PSYCH: Normal mood and affect. Responds appropriately to commands \par EYES: Sclerae Anicteric. No discharge. EOMI.\par RESP: Breathing unlabored \par CV: DP pulses 2+ and equal. No varicosities noted \par EXT: No C/C/E\par SKIN: No lesions noted \par STRENGTH: 5/5 for bilateral UE and LE \par TONE: Normal, No clonus.\par PALP: No tenderness on cervical paraspinals\par cervical ROM intact\par \par

## 2021-07-12 ENCOUNTER — APPOINTMENT (OUTPATIENT)
Dept: OTOLARYNGOLOGY | Facility: CLINIC | Age: 66
End: 2021-07-12
Payer: MEDICARE

## 2021-07-12 PROCEDURE — 99213 OFFICE O/P EST LOW 20 MIN: CPT

## 2021-07-15 ENCOUNTER — APPOINTMENT (OUTPATIENT)
Dept: TRANSPLANT | Facility: CLINIC | Age: 66
End: 2021-07-15
Payer: MEDICARE

## 2021-07-15 PROCEDURE — 86832 HLA CLASS I HIGH DEFIN QUAL: CPT

## 2021-07-15 PROCEDURE — 99001T: CUSTOM

## 2021-07-15 PROCEDURE — 86833 HLA CLASS II HIGH DEFIN QUAL: CPT

## 2021-07-15 NOTE — PHYSICAL EXAM
[Normal] : orientation to person, place, and time: normal [FreeTextEntry1] : dysarthric speech [de-identified] : mild submental lymphedema improved, healed neck dissection scar [de-identified] : healthy lobulated ALT flap, extremely limited tongue movement, no dehiscence, no bleeding, no ulceration, no new masses or lesions, dry mucosa [de-identified] : receding gum line

## 2021-07-15 NOTE — HISTORY OF PRESENT ILLNESS
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 Temporary trach and PEG were placed. [facility] would not accept patients with tracheostomies, so he was readmitted 9/19-9/21 for decannulation and hemodialysis. He completed RT in December 2020 with Parashar.\par \par Surgical path: infiltrating moderately differentiated, keratinizing SCC, 3.5 cm, which extends into underlying skeletal muscle, DOI 2.7 cm, negative margins, 0/80 LNs, no LVI, no PNI.\par \par His PEG fell out mid-November and he did not want it replaced. He is tolerating 6 cans Neprorth, soups, smoothies. Reports weight gain since surgery. Denies pain, fever, chills, dyspnea, trismus. His speech is mostly intelligible.\par \par 1/25/21 PET/CT: focal FDG avidity anterior to midline mandible, left anterior aspect of tongue without CT correlation, can be nonspecific inflammatory findings.\par \par 2/24/21 MBS: moderate oral and mild pharyngeal dysphagia, recommendation: smooth runny puree and thin liquids, small sips, head tilt.\par \par  [FreeTextEntry1] : Pt continues with Nepro, liquids, soups, smoothies.  Unable to propel solids with tongue.  Weight stable. He is undergoing lymphedema therapy with improvement and follows with PMR.  He reports his dentist advised him he needed to see an oral surgeon for receding gums? \par

## 2021-07-15 NOTE — ADDENDUM
[FreeTextEntry1] : Speech Pathology:\par \par Pt seen today in conjunction with Dr. 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 Temporary trach and PEG placed. Pt was readmitted 9/19-9/21 for decannulation and hemodialysis. He completed RT in December 2020.\par \par Pt was seen for dysphagia therapy [facility] in 2020. During his follow-up visit with Dr. Marie on 1/13/21, pt has been on a full liquid diet. PEG was not replaced after it was dislodged in Nov 2020. Pt noted to have soft signs of airway protection deficits at that visit and a repeat MBSS was recommended.  MBSS completed in Feb 2021 and pt recommended a thinned puree diet with thin liquids. \par \par During today's visit, patient reported that his po intake is primarily liquids ( soups, juices, Nepro) as it is difficult for him to take purees.  He manages swallowing purees by washing it down with liquids.  He is maintaining weight at 150 lbs. \par \par Pt recommended to slowly transition to a mechanical soft diet with thin liquids and to try to eat/drink more "real" foods instead of relying on Nepro supplement. Pt also instructed to continue performing head/neck stretching and swallow exercises he was instructed on in previous visits. Pt agreed.\par \par Mario Owen, MS, CCC-SLP, BCS-S\par Supervisor, Speech Pathology \par

## 2021-07-15 NOTE — ASSESSMENT
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 ALEXA/NER on exam. Undergoing lymphedema therapy with improvement. Continued dysarthria and oral dysphagia, on predominantly liquid diet.\par \par - f/u Parashar.\par - Advised pt of my impending departure.  Referred back to Sukhdev Walter or alternatively Conrad Thomas for continued surveillance, f/u in 2 months.\par - Continue lymphedema therapy.\par - Advised pt he likely needs to see periodontist.  We do not know of any periodontists. Consider contacting Owen Abad.\par - Pt verbalized understanding of above. \par

## 2021-08-02 ENCOUNTER — APPOINTMENT (OUTPATIENT)
Dept: OTOLARYNGOLOGY | Facility: CLINIC | Age: 66
End: 2021-08-02
Payer: MEDICARE

## 2021-08-02 VITALS
HEIGHT: 70 IN | BODY MASS INDEX: 22.33 KG/M2 | SYSTOLIC BLOOD PRESSURE: 178 MMHG | DIASTOLIC BLOOD PRESSURE: 82 MMHG | HEART RATE: 92 BPM | WEIGHT: 156 LBS

## 2021-08-02 PROCEDURE — 31575 DIAGNOSTIC LARYNGOSCOPY: CPT

## 2021-08-02 PROCEDURE — 99214 OFFICE O/P EST MOD 30 MIN: CPT | Mod: 25

## 2021-08-02 RX ORDER — LEVOTHYROXINE SODIUM 137 UG/1
TABLET ORAL
Refills: 0 | Status: COMPLETED | COMMUNITY
End: 2021-08-02

## 2021-08-02 RX ORDER — AMLODIPINE BESYLATE 10 MG/1
10 TABLET ORAL DAILY
Refills: 0 | Status: COMPLETED | COMMUNITY
End: 2021-08-02

## 2021-08-02 NOTE — REASON FOR VISIT
[Initial Evaluation] : an initial evaluation for [FreeTextEntry2] : refer by Dr. Marie for continue follow for tongue cancer

## 2021-08-02 NOTE — HISTORY OF PRESENT ILLNESS
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 pt is refer by Dr. Marie to continue follow care here.  pt is working with SLP for lymphedema therapy and swallow therapy for dysarthria and oral dysphagia, on predominantly liquid diet. pt has no pain, no throat pain, and gaining wt. \par \par

## 2021-08-02 NOTE — PROCEDURE
[Unable to Cooperate with Mirror] : patient unable to cooperate with mirror [None] : none [Flexible Endoscope] : examined with the flexible endoscope [Serial Number: ___] : Serial Number: [unfilled] [de-identified] : No lesions in the NPx, OPx, HPx or larynx.  Stable posttreatment changes, VC are mobile, airway patent.\par  [de-identified] : Tongue cancer

## 2021-08-08 NOTE — PHYSICAL EXAM
[General Appearance - Alert] : alert [Sclera] : the sclera and conjunctiva were normal [General Appearance - In No Acute Distress] : in no acute distress [Outer Ear] : the ears and nose were normal in appearance [PERRL With Normal Accommodation] : pupils were equal in size, round, and reactive to light [Extraocular Movements] : extraocular movements were intact [Oropharynx] : the oropharynx was normal [Neck Appearance] : the appearance of the neck was normal [Neck Cervical Mass (___cm)] : no neck mass was observed [Jugular Venous Distention Increased] : there was no jugular-venous distention [Thyroid Nodule] : there were no palpable thyroid nodules [Thyroid Diffuse Enlargement] : the thyroid was not enlarged [Auscultation Breath Sounds / Voice Sounds] : lungs were clear to auscultation bilaterally [Heart Rate And Rhythm] : heart rate was normal and rhythm regular [Heart Sounds Gallop] : no gallops [Heart Sounds] : normal S1 and S2 [Murmurs] : no murmurs [Heart Sounds Pericardial Friction Rub] : no pericardial rub [Edema] : there was no peripheral edema [Bowel Sounds] : normal bowel sounds [Abdomen Soft] : soft [Abdomen Tenderness] : non-tender [Abdomen Mass (___ Cm)] : no abdominal mass palpated [Skin Turgor] : normal skin turgor [Dialysis Catheter] : dialysis catheter [Skin Color & Pigmentation] : normal skin color and pigmentation [] : right posterior tibial non-palpable [Deep Tendon Reflexes (DTR)] : deep tendon reflexes were 2+ and symmetric [No Focal Deficits] : no focal deficits [Sensation] : the sensory exam was normal to light touch and pinprick [Affect] : the affect was normal [Oriented To Time, Place, And Person] : oriented to person, place, and time [Impaired Insight] : insight and judgment were intact unknown

## 2021-08-13 ENCOUNTER — APPOINTMENT (OUTPATIENT)
Dept: RADIATION ONCOLOGY | Facility: CLINIC | Age: 66
End: 2021-08-13
Payer: MEDICARE

## 2021-08-13 ENCOUNTER — APPOINTMENT (OUTPATIENT)
Dept: RADIATION ONCOLOGY | Facility: CLINIC | Age: 66
End: 2021-08-13

## 2021-08-13 VITALS — WEIGHT: 153 LBS | HEIGHT: 70 IN | BODY MASS INDEX: 21.9 KG/M2 | RESPIRATION RATE: 18 BRPM

## 2021-08-13 PROCEDURE — 99214 OFFICE O/P EST MOD 30 MIN: CPT | Mod: 25

## 2021-08-13 RX ORDER — 70%ISOPROPYL ALCOHOL 0.7 ML/ML
70 SWAB TOPICAL
Refills: 0 | Status: ACTIVE | COMMUNITY

## 2021-08-13 NOTE — REVIEW OF SYSTEMS
[Anal Pain: Grade 0] : Anal Pain: Grade 0 [Constipation: Grade 0] : Constipation: Grade 0 [Diarrhea: Grade 0] : Diarrhea: Grade 0 [Dyspepsia: Grade 0] : Dyspepsia: Grade 0 [Dysphagia: Grade 0] : Dysphagia: Grade 0 [Esophagitis: Grade 0] : Esophagitis: Grade 0 [Fecal Incontinence: Grade 0] : Fecal Incontinence: Grade 0 [Gastroparesis: Grade 0] : Gastroparesis: Grade 0 [Nausea: Grade 0] : Nausea: Grade 0 [Proctitis: Grade 0] : Proctitis: Grade 0 [Rectal Pain: Grade 0] : Rectal Pain: Grade 0 [Small Intestinal Obstruction: Grade 0] : Small Intestinal Obstruction: Grade 0 [Vomiting: Grade 0] : Vomiting: Grade 0 [Tinnitus - Grade 0] : Tinnitus - Grade 0 [Blurred Vision: Grade 0] : Blurred Vision: Grade 0 [Mucositis Oral: Grade 0] : Mucositis Oral: Grade 0  [Xerostomia: Grade 1 - Symptomatic (e.g., dry or thick saliva) without significant dietary alteration; unstimulated saliva flow >0.2 ml/min] : Xerostomia: Grade 1 - Symptomatic (e.g., dry or thick saliva) without significant dietary alteration; unstimulated saliva flow >0.2 ml/min [Salivary duct inflammation: Grade 1 - Slightly thickened saliva; slightly altered taste (e.g., metallic)] : Salivary duct inflammation: Grade 1 - Slightly thickened saliva; slightly altered taste (e.g., metallic) [Dysgeusia: Grade 0] : Dysgeusia: Grade 0 [Hoarseness: Grade 0] : Hoarseness: Grade 0 [Alopecia: Grade 0] : Alopecia: Grade 0 [Pruritus: Grade 0] : Pruritus: Grade 0 [Skin Atrophy: Grade 0] : Skin Atrophy: Grade 0 [Skin Hyperpigmentation: Grade 0] : Skin Hyperpigmentation: Grade 0 [Skin Induration: Grade 0] : Skin Induration: Grade 0 [Dermatitis Radiation: Grade 0] : Dermatitis Radiation: Grade 0 [Oral Pain: Grade 0] : Oral Pain: Grade 0

## 2021-08-16 ENCOUNTER — APPOINTMENT (OUTPATIENT)
Dept: RADIOLOGY | Facility: IMAGING CENTER | Age: 66
End: 2021-08-16
Payer: MEDICARE

## 2021-08-16 ENCOUNTER — OUTPATIENT (OUTPATIENT)
Dept: OUTPATIENT SERVICES | Facility: HOSPITAL | Age: 66
LOS: 1 days | End: 2021-08-16
Payer: COMMERCIAL

## 2021-08-16 ENCOUNTER — APPOINTMENT (OUTPATIENT)
Dept: CT IMAGING | Facility: IMAGING CENTER | Age: 66
End: 2021-08-16
Payer: MEDICARE

## 2021-08-16 DIAGNOSIS — C02.9 MALIGNANT NEOPLASM OF TONGUE, UNSPECIFIED: ICD-10-CM

## 2021-08-16 DIAGNOSIS — Z90.49 ACQUIRED ABSENCE OF OTHER SPECIFIED PARTS OF DIGESTIVE TRACT: Chronic | ICD-10-CM

## 2021-08-16 DIAGNOSIS — I77.0 ARTERIOVENOUS FISTULA, ACQUIRED: Chronic | ICD-10-CM

## 2021-08-16 PROCEDURE — 70491 CT SOFT TISSUE NECK W/DYE: CPT

## 2021-08-16 PROCEDURE — 71046 X-RAY EXAM CHEST 2 VIEWS: CPT | Mod: 26

## 2021-08-16 PROCEDURE — 71046 X-RAY EXAM CHEST 2 VIEWS: CPT

## 2021-08-16 PROCEDURE — 70491 CT SOFT TISSUE NECK W/DYE: CPT | Mod: 26,MH

## 2021-08-16 NOTE — HISTORY OF PRESENT ILLNESS
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 Temporary trach and PEG were placed. [facility] would not accept patients with tracheostomies, so he was readmitted 9/19-9/21 for decannulation and hemodialysis. Tumor board consensus for post-op RT including contralateral neck.\par \par Surgical path: infiltrating moderately differentiated, keratinizing SCC, 3.5 cm, which extends into underlying skeletal muscle, DOI 2.7 cm, negative margins, 0/80 LNs, no LVI, no PNI.\par \par He completed 60 Gy / 30 Fx of radiation in 12/2020.  His PEG tube fell out in 11/2020 and did not want it replaced during radiation treatment course. \par \par PET scan in 1/2021 Impression:  Focal FDG avidity anterior to the midline mandible, left anterior aspect of the tongue without CT correlation. Findings can be nonspecific inflammatory findings. Clinical correlation is recommended.\par \par Today in clinic. Denies pain, dysphagia. Rx diflucan given for thrush.  Eating soft foods and gaining weight. Encouraged to continue f/u with ENT service. f/u with PMR for left neck pain

## 2021-08-18 ENCOUNTER — APPOINTMENT (OUTPATIENT)
Dept: PHYSICAL MEDICINE AND REHAB | Facility: CLINIC | Age: 66
End: 2021-08-18
Payer: MEDICARE

## 2021-08-18 VITALS
RESPIRATION RATE: 18 BRPM | WEIGHT: 158.95 LBS | DIASTOLIC BLOOD PRESSURE: 88 MMHG | SYSTOLIC BLOOD PRESSURE: 169 MMHG | OXYGEN SATURATION: 99 % | TEMPERATURE: 96.44 F | BODY MASS INDEX: 22.81 KG/M2 | HEART RATE: 98 BPM

## 2021-08-18 DIAGNOSIS — I89.0 LYMPHEDEMA, NOT ELSEWHERE CLASSIFIED: ICD-10-CM

## 2021-08-18 PROCEDURE — 99214 OFFICE O/P EST MOD 30 MIN: CPT

## 2021-08-18 NOTE — ASSESSMENT
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 referred for flexitouch as patient still has residual lymphedema after lymphedema therapy program and compression.  \par -recommended to return to SLP for speech and swallow therapy/evaluation, also for oral stretching program- patient states he does not wish to go to slp at this time \par -weight is stable\par -recommend psychology/psychiatry evaluation for support however patient declined. \par Patient has normal mouth opening. continue to monitor, may benefit from therabite if agreeable, recommend oral stretching to maintain mouth opening\par - Follow up in 6 weeks. \par

## 2021-08-18 NOTE — PHYSICAL EXAM
[FreeTextEntry1] : GEN: AAOx3, NAD\par HEENT: + submandibular lymphedema improving however with firmness\par  mouth opening >35mm\par PSYCH: mildly flat affect. Responds appropriately to commands \par EYES: Sclerae Anicteric. No discharge. EOMI.\par RESP: Breathing unlabored \par CV: DP pulses 2+ and equal. No varicosities noted \par EXT: No C/C/E\par SKIN: No lesions noted \par STRENGTH: 5-/5 for bilateral UE and LE \par TONE: Normal, No clonus.\par PALP: No tenderness on cervical paraspinals\par cervical ROM intact\par

## 2021-08-18 NOTE — HISTORY OF PRESENT ILLNESS
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 He was last seen on 7/7/21 at which time he was to continue lymphedema therapy and referred for flexitouch pneumatic compression. He states he now has garment and wears it intermittently.  Denies pain.  Mouth opening improved >35mm, denies jaw pain.\par He drinks nepro, smoothies, juices. weight is stable 72 kg today. \par \par Patient states he does not want referral to SLP,  states he is doing well overall, does not want referral for support group or psychology/psychiatry.  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 \par \par Patient has bp in 160s today, states he will recheck it at home, if still elevated with discuss with his PMD.\par pmd Dr Cha  [phone]  [phone]

## 2021-08-20 NOTE — ASU PATIENT PROFILE, ADULT - PAIN SCALE PREFERRED, PROFILE
numerical 0-10
You can access the FollowMyHealth Patient Portal offered by [facility] by registering at the following website: [email or web address]. By joining [facility]’s FollowMyHealth portal, you will also be able to view your health information using other applications (apps) compatible with our system.

## 2021-09-14 ENCOUNTER — NON-APPOINTMENT (OUTPATIENT)
Age: 66
End: 2021-09-14

## 2021-09-20 ENCOUNTER — APPOINTMENT (OUTPATIENT)
Dept: OTOLARYNGOLOGY | Facility: CLINIC | Age: 66
End: 2021-09-20
Payer: MEDICARE

## 2021-09-20 PROCEDURE — 31575 DIAGNOSTIC LARYNGOSCOPY: CPT

## 2021-09-20 PROCEDURE — 99214 OFFICE O/P EST MOD 30 MIN: CPT | Mod: 25

## 2021-09-20 NOTE — PROCEDURE
[Unable to Cooperate with Mirror] : patient unable to cooperate with mirror [None] : none [Flexible Endoscope] : examined with the flexible endoscope [Serial Number: ___] : Serial Number: [unfilled] [de-identified] : No lesions in the NPx, OPx, HPx or larynx. Stable posttreatment changes, VC are mobile, airway patent.\par

## 2021-09-20 NOTE — REASON FOR VISIT
[Subsequent Evaluation] : a subsequent evaluation for [FreeTextEntry2] : Dr. Marie for continue follow for tongue cancer and abnormal ct result

## 2021-09-20 NOTE — PHYSICAL EXAM
[Laryngoscopy Performed] : laryngoscopy was performed, see procedure section for findings [Normal] : no rashes [de-identified] : Posttreatment changes, no LAD. [FreeTextEntry1] : Flap is well healed, limited mobility, no concerning lesions in the OC/OPx on inspection or palpation.  There is a small area of exposed bone with mild debris along the left lingual surface of the mandibular body.  \par

## 2021-09-20 NOTE — HISTORY OF PRESENT ILLNESS
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 pt of Dr. Marie  and will continue follow care here. pt is refer back for abnormal ct of neck on 8/2021 -Postsurgical changes without evidence for recurrent disease. 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 t has no pain at the jaw or throat pain.  pt is working with SLP for lymphedema therapy and swallow therapy for dysarthria and oral dysphagia, on predominantly liquid diet. wt stable.

## 2021-09-29 ENCOUNTER — APPOINTMENT (OUTPATIENT)
Dept: PHYSICAL MEDICINE AND REHAB | Facility: CLINIC | Age: 66
End: 2021-09-29

## 2021-10-05 ENCOUNTER — NON-APPOINTMENT (OUTPATIENT)
Age: 66
End: 2021-10-05

## 2021-10-19 NOTE — DISCHARGE NOTE PROVIDER - NSDCCAREPROVSEEN_GEN_ALL_CORE_FT
Advanced [facility]at[facility] - jody [facility] -  [facility]       Personalized Preventive Plan for Felix Price - 10/19/2021  Medicare offers a range of preventive health benefits. Some of the tests and screenings are paid in full while other may be subject to a deductible, co-insurance, and/or copay. Some of these benefits include a comprehensive review of your medical history including lifestyle, illnesses that may run in your family, and various assessments and screenings as appropriate. After reviewing your medical record and screening and assessments performed today your provider may have ordered immunizations, labs, imaging, and/or referrals for you. A list of these orders (if applicable) as well as your Preventive Care list are included within your After Visit Summary for your review. Other Preventive Recommendations:    · A preventive eye exam performed by an eye specialist is recommended every 1-2 years to screen for glaucoma; cataracts, macular degeneration, and other eye disorders. · A preventive dental visit is recommended every 6 months. · Try to get at least 150 minutes of exercise per week or 10,000 steps per day on a pedometer . · Order or download the FREE \"Exercise & Physical Activity: Your Everyday Guide\" from [employer]. Call 2-632.761.6887 or search [employer] online. · You need 2550-9874 mg of calcium and 3300-7370 IU of vitamin D per day. It is possible to meet your calcium requirement with diet alone, but a vitamin D supplement is usually necessary to meet this goal.  · When exposed to the sun, use a sunscreen that protects against both UVA and UVB radiation with an SPF of 30 or greater. Reapply every 2 to 3 hours or after sweating, drying off with a towel, or swimming. · Always wear a seat belt when traveling in a car. Always wear a helmet when riding a bicycle or motorcycle.
Ez Marie

## 2021-11-01 ENCOUNTER — APPOINTMENT (OUTPATIENT)
Dept: OTOLARYNGOLOGY | Facility: CLINIC | Age: 66
End: 2021-11-01
Payer: MEDICARE

## 2021-11-01 VITALS
HEART RATE: 96 BPM | WEIGHT: 158 LBS | HEIGHT: 70 IN | BODY MASS INDEX: 22.62 KG/M2 | SYSTOLIC BLOOD PRESSURE: 142 MMHG | DIASTOLIC BLOOD PRESSURE: 72 MMHG

## 2021-11-01 PROCEDURE — 31575 DIAGNOSTIC LARYNGOSCOPY: CPT

## 2021-11-01 PROCEDURE — 99214 OFFICE O/P EST MOD 30 MIN: CPT | Mod: 25

## 2021-11-01 RX ORDER — CARVEDILOL 25 MG/1
25 TABLET, FILM COATED ORAL TWICE DAILY
Qty: 60 | Refills: 6 | Status: COMPLETED | COMMUNITY
End: 2021-11-01

## 2021-11-01 RX ORDER — MIDODRINE HYDROCHLORIDE 2.5 MG/1
2.5 TABLET ORAL
Qty: 72 | Refills: 0 | Status: DISCONTINUED | COMMUNITY
Start: 2021-07-11

## 2021-11-01 RX ORDER — NUT.TX.GLUCOSE INTOLERANCE,SOY
BAR ORAL
Qty: 180 | Refills: 11 | Status: COMPLETED | COMMUNITY
Start: 2020-10-27 | End: 2021-11-01

## 2021-11-01 RX ORDER — ATORVASTATIN CALCIUM 20 MG/1
20 TABLET, FILM COATED ORAL
Qty: 90 | Refills: 0 | Status: DISCONTINUED | COMMUNITY
Start: 2021-09-10

## 2021-11-01 RX ORDER — BLOOD SUGAR DIAGNOSTIC
STRIP MISCELLANEOUS
Qty: 100 | Refills: 0 | Status: DISCONTINUED | COMMUNITY
Start: 2021-06-14

## 2021-11-01 RX ORDER — GLIPIZIDE 5 MG/1
5 TABLET, FILM COATED, EXTENDED RELEASE ORAL
Qty: 180 | Refills: 0 | Status: DISCONTINUED | COMMUNITY
Start: 2021-09-10

## 2021-11-01 RX ORDER — FLUCONAZOLE 100 MG/1
100 TABLET ORAL
Qty: 11 | Refills: 1 | Status: COMPLETED | COMMUNITY
Start: 2021-08-13 | End: 2021-11-01

## 2021-11-01 RX ORDER — MIDODRINE HYDROCHLORIDE 10 MG/1
10 TABLET ORAL
Qty: 36 | Refills: 0 | Status: DISCONTINUED | COMMUNITY
Start: 2021-10-30

## 2021-11-01 RX ORDER — FERRIC CITRATE 210 MG/1
1 GM TABLET, COATED ORAL
Qty: 90 | Refills: 0 | Status: DISCONTINUED | COMMUNITY
Start: 2021-09-14

## 2021-11-01 RX ORDER — HYDRALAZINE HYDROCHLORIDE 50 MG/1
50 TABLET ORAL 3 TIMES DAILY
Refills: 1 | Status: COMPLETED | COMMUNITY
End: 2021-11-01

## 2021-11-01 RX ORDER — FAMOTIDINE 20 MG/1
20 TABLET, FILM COATED ORAL
Qty: 180 | Refills: 0 | Status: DISCONTINUED | COMMUNITY
Start: 2021-09-10

## 2021-11-01 RX ORDER — CARVEDILOL 3.12 MG/1
3.12 TABLET, FILM COATED ORAL
Qty: 60 | Refills: 0 | Status: DISCONTINUED | COMMUNITY
Start: 2021-05-18

## 2021-11-01 NOTE — PHYSICAL EXAM
[Laryngoscopy Performed] : laryngoscopy was performed, see procedure section for findings [Normal] : no rashes [de-identified] : Posttreatment changes, no LAD. [FreeTextEntry1] : Flap is well healed, limited mobility, no concerning lesions in the OC/OPx on inspection or palpation.  The small area of exposed bone with mild debris along the left lingual surface of the mandibular body is no longer appreciated.  \par

## 2021-11-01 NOTE — PROCEDURE
[None] : none [Flexible Endoscope] : examined with the flexible endoscope [Unable to Cooperate with Mirror] : patient unable to cooperate with mirror [Serial Number: ___] : Serial Number: [unfilled] [de-identified] : No lesions in the NPx, OPx, HPx or larynx.  Stable posttreatment changes, VC are mobile, airway patent.\par

## 2021-11-01 NOTE — HISTORY OF PRESENT ILLNESS
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 pt of Dr. Marie  and will continue follow care here. 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  pt is working with SLP for lymphedema therapy and swallow therapy for dysarthria and oral dysphagia, on predominantly liquid diet. wt stable. \par \par Patient reports jaw pain has improved since last visit, tolerating Nepro shakes three time a day and thin liquids. Reports weight gain since last visit. Denies throat pain, dysphagia, odynophagia, dyspnea, dysphonia, bleeding, hemoptysis, mucus production, recent fevers and/or throat infections.

## 2021-12-01 ENCOUNTER — OUTPATIENT (OUTPATIENT)
Dept: OUTPATIENT SERVICES | Facility: HOSPITAL | Age: 66
LOS: 1 days | End: 2021-12-01
Payer: COMMERCIAL

## 2021-12-01 ENCOUNTER — APPOINTMENT (OUTPATIENT)
Dept: CT IMAGING | Facility: IMAGING CENTER | Age: 66
End: 2021-12-01
Payer: MEDICARE

## 2021-12-01 DIAGNOSIS — Z00.8 ENCOUNTER FOR OTHER GENERAL EXAMINATION: ICD-10-CM

## 2021-12-01 DIAGNOSIS — M87.9 OSTEONECROSIS, UNSPECIFIED: ICD-10-CM

## 2021-12-01 DIAGNOSIS — I77.0 ARTERIOVENOUS FISTULA, ACQUIRED: Chronic | ICD-10-CM

## 2021-12-01 DIAGNOSIS — Z90.49 ACQUIRED ABSENCE OF OTHER SPECIFIED PARTS OF DIGESTIVE TRACT: Chronic | ICD-10-CM

## 2021-12-01 DIAGNOSIS — C02.9 MALIGNANT NEOPLASM OF TONGUE, UNSPECIFIED: ICD-10-CM

## 2021-12-01 PROCEDURE — 70491 CT SOFT TISSUE NECK W/DYE: CPT | Mod: 26

## 2021-12-01 PROCEDURE — 71260 CT THORAX DX C+: CPT | Mod: 26

## 2021-12-01 PROCEDURE — 70491 CT SOFT TISSUE NECK W/DYE: CPT

## 2021-12-01 PROCEDURE — 71260 CT THORAX DX C+: CPT

## 2021-12-01 PROCEDURE — 82565 ASSAY OF CREATININE: CPT

## 2021-12-20 ENCOUNTER — APPOINTMENT (OUTPATIENT)
Dept: OTOLARYNGOLOGY | Facility: CLINIC | Age: 66
End: 2021-12-20
Payer: MEDICARE

## 2021-12-20 VITALS
WEIGHT: 157 LBS | HEART RATE: 86 BPM | DIASTOLIC BLOOD PRESSURE: 73 MMHG | HEIGHT: 70 IN | SYSTOLIC BLOOD PRESSURE: 140 MMHG | BODY MASS INDEX: 22.48 KG/M2

## 2021-12-20 DIAGNOSIS — C02.9 MALIGNANT NEOPLASM OF TONGUE, UNSPECIFIED: ICD-10-CM

## 2021-12-20 DIAGNOSIS — R13.10 DYSPHAGIA, UNSPECIFIED: ICD-10-CM

## 2021-12-20 DIAGNOSIS — M87.9 OSTEONECROSIS, UNSPECIFIED: ICD-10-CM

## 2021-12-20 PROCEDURE — 31575 DIAGNOSTIC LARYNGOSCOPY: CPT

## 2021-12-20 PROCEDURE — 99214 OFFICE O/P EST MOD 30 MIN: CPT | Mod: 25

## 2021-12-20 NOTE — PHYSICAL EXAM
[Laryngoscopy Performed] : laryngoscopy was performed, see procedure section for findings [Normal] : no rashes [de-identified] : Posttreatment changes, no LAD. [FreeTextEntry1] : Flap is well healed, limited mobility, no concerning lesions in the OC/OPx on inspection or palpation.  The small area of exposed bone with mild debris along the left lingual surface of the mandibular body is no longer appreciated.  \par

## 2021-12-20 NOTE — HISTORY OF PRESENT ILLNESS
[de-identified] : 66 year old Male with [id number] left oral tongue SCCa \par History s/p 9/1/2020 left extended hemiglossectomy, resection left anterior floor of mouth, left neck dissection, and ALT free flap and post-op RT completed 12/2020\par Patient of Dr. 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

## 2021-12-20 NOTE — PROCEDURE
[Unable to Cooperate with Mirror] : patient unable to cooperate with mirror [None] : none [Flexible Endoscope] : examined with the flexible endoscope [Serial Number: ___] : Serial Number: [unfilled] [de-identified] : No lesions in the NPx, OPx, HPx or larynx.  Stable posttreatment changes, VC are mobile, airway patent.\par

## 2021-12-23 ENCOUNTER — NON-APPOINTMENT (OUTPATIENT)
Age: 66
End: 2021-12-23

## 2022-02-10 ENCOUNTER — NON-APPOINTMENT (OUTPATIENT)
Age: 67
End: 2022-02-10

## 2022-02-16 ENCOUNTER — APPOINTMENT (OUTPATIENT)
Dept: RADIATION ONCOLOGY | Facility: CLINIC | Age: 67
End: 2022-02-16
Payer: MEDICARE

## 2022-02-16 VITALS — HEIGHT: 70 IN | RESPIRATION RATE: 18 BRPM | BODY MASS INDEX: 22.33 KG/M2 | WEIGHT: 156 LBS

## 2022-02-16 PROCEDURE — 99214 OFFICE O/P EST MOD 30 MIN: CPT

## 2022-02-16 NOTE — REVIEW OF SYSTEMS
[Anal Pain: Grade 0] : Anal Pain: Grade 0 [Constipation: Grade 0] : Constipation: Grade 0 [Diarrhea: Grade 0] : Diarrhea: Grade 0 [Dyspepsia: Grade 0] : Dyspepsia: Grade 0 [Dysphagia: Grade 1 - Symptomatic, able to eat regular diet] : Dysphagia: Grade 1 - Symptomatic, able to eat regular diet [Esophagitis: Grade 0] : Esophagitis: Grade 0 [Fecal Incontinence: Grade 0] : Fecal Incontinence: Grade 0 [Gastroparesis: Grade 0] : Gastroparesis: Grade 0 [Nausea: Grade 0] : Nausea: Grade 0 [Proctitis: Grade 0] : Proctitis: Grade 0 [Rectal Pain: Grade 0] : Rectal Pain: Grade 0 [Small Intestinal Obstruction: Grade 0] : Small Intestinal Obstruction: Grade 0 [Vomiting: Grade 0] : Vomiting: Grade 0 [Tinnitus - Grade 0] : Tinnitus - Grade 0 [Blurred Vision: Grade 0] : Blurred Vision: Grade 0 [Mucositis Oral: Grade 0] : Mucositis Oral: Grade 0  [Xerostomia: Grade 1 - Symptomatic (e.g., dry or thick saliva) without significant dietary alteration; unstimulated saliva flow >0.2 ml/min] : Xerostomia: Grade 1 - Symptomatic (e.g., dry or thick saliva) without significant dietary alteration; unstimulated saliva flow >0.2 ml/min [Oral Pain: Grade 0] : Oral Pain: Grade 0 [Salivary duct inflammation: Grade 1 - Slightly thickened saliva; slightly altered taste (e.g., metallic)] : Salivary duct inflammation: Grade 1 - Slightly thickened saliva; slightly altered taste (e.g., metallic) [Dysgeusia: Grade 0] : Dysgeusia: Grade 0 [Hoarseness: Grade 0] : Hoarseness: Grade 0 [Alopecia: Grade 0] : Alopecia: Grade 0 [Pruritus: Grade 0] : Pruritus: Grade 0 [Skin Atrophy: Grade 0] : Skin Atrophy: Grade 0 [Skin Hyperpigmentation: Grade 0] : Skin Hyperpigmentation: Grade 0 [Dermatitis Radiation: Grade 0] : Dermatitis Radiation: Grade 0 [Skin Induration: Grade 0] : Skin Induration: Grade 0

## 2022-02-22 NOTE — HISTORY OF PRESENT ILLNESS
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 Temporary trach and PEG were placed. [facility] would not accept patients with tracheostomies, so he was readmitted 9/19-9/21 for decannulation and hemodialysis. Tumor board consensus for post-op RT including contralateral neck.\par \par Surgical path: infiltrating moderately differentiated, keratinizing SCC, 3.5 cm, which extends into underlying skeletal muscle, DOI 2.7 cm, negative margins, 0/80 LNs, no LVI, no PNI.\par \par He completed 60 Gy / 30 Fx of radiation in 12/2020.  His PEG tube fell out in 11/2020 and did not want it replaced during radiation treatment course. \par \par PET scan in 1/2021 Impression:  Focal FDG avidity anterior to the midline mandible, left anterior aspect of the tongue without CT correlation. Findings can be nonspecific inflammatory findings. Clinical correlation is recommended.\par \par 12/2021 -- CT scan showed IMPRESSION:\par Posttreatment changes without evidence for recurrence of disease. No lymphadenopathy. Stable size but increased lucency involving the lingual cortex of the anterior left mandible, compatible with progression of osteoradionecrosis.\par \par Today in clinic. Denies pain, dysphagia. Eating soft foods and gaining weight. Encouraged to continue f/u with ENT service. f/u with PMR for left neck stiffness, dysarthria.

## 2022-03-21 ENCOUNTER — APPOINTMENT (OUTPATIENT)
Dept: OTOLARYNGOLOGY | Facility: CLINIC | Age: 67
End: 2022-03-21

## 2022-08-16 ENCOUNTER — APPOINTMENT (OUTPATIENT)
Dept: RADIATION ONCOLOGY | Facility: CLINIC | Age: 67
End: 2022-08-16

## 2022-08-22 NOTE — DISCHARGE NOTE PROVIDER - NSCORESITESY/N_GEN_A_CORE_RD
No
[FreeTextEntry1] : Constitutional:  Patient was well-developed, well-nourished and in no acute distress. \par \par Head:  Normocephalic, atraumatic. Tympanic membranes were clear. \par \par Neck:  Supple with full range of motion. \par \par Cardiovascular:  Cardiac rhythm was irregular without murmur. There were no carotid bruits. Peripheral pulses were full and symmetric. \par \par Respiratory:  Lungs were clear. \par \par Abdomen:  Soft and nontender. \par \par Spine:  Nontender. \par \par Skin:  There were no rashes. \par \par NEUROLOGICAL EXAMINATION:\par \par Mental Status: Patient was alert and oriented. Speech was fluent. There was no dysarthria.  He was mildly hypophonic.  \par \par Cranial Nerves: \par \par II: He could finger count bilaterally.  Pupils were surgical but reactive. Visual fields were full. Funduscopic examination was normal. \par \par III, IV, VI:  Eye movements were full but saccadic without nystagmus. \par \par V: Facial sensation was intact. \par \par VII: Facial strength was normal.  There was minimal if any hypomimia.\par \par VIII: Hearing was diminished bilaterally. \par \par IX, X: Palatal movement was normal. Phonation was normal. \par \par XI: Sternocleidomastoids and trapezii were normal. \par \par XII: Tongue was midline and movements normal. There was no lingual atrophy or fasciculations. \par \par Motor Examination: Muscle bulk, tone and strength were normal except for limited shoulder abduction, left worse than right..  There was no tremor or rigidity.\par \par Sensory Examination: Pinprick, vibration and joint position sense were intact. \par \par Reflexes: DTRs were 2 at the biceps and knees and absent elsewhere.\par \par Plantar Responses: Plantar responses were flexor. \par \par Coordination/Cerebellar Function: There was no dysmetria on finger to nose testing.  There was mild heel-to-shin dysmetria.\par \par Gait/Stance: Posture was mildly stooped.  He had a wide-based unsteady gait.  He was very unsteady on Romberg testing and could not tandem.\par

## 2022-10-18 NOTE — DISCHARGE NOTE PROVIDER - PROVIDER TOKENS
PROVIDER:[TOKEN:[[id number]],FOLLOWUP:[1 week],ESTABLISHEDPATIENT:[T]],PROVIDER:[TOKEN:[[id number]],FOLLOWUP:[1 week],ESTABLISHEDPATIENT:[T]] Finasteride Counseling:  I discussed with the patient the risks of use of finasteride including but not limited to decreased libido, decreased ejaculate volume, gynecomastia, and depression. Women should not handle medication.  All of the patient's questions and concerns were addressed. Finasteride Male Counseling: Finasteride Counseling:  I discussed with the patient the risks of use of finasteride including but not limited to decreased libido, decreased ejaculate volume, gynecomastia, and depression. Women should not handle medication.  All of the patient's questions and concerns were addressed.

## 2023-02-04 NOTE — H&P PST ADULT - PRO ANTICIPATED DISCH DISP
Labs consistent with history of ESRD.  Last completed dialysis session yesterday with next session due Saturday.  Plan:  -monitor labs  -continue home medications  -avoid nephrotoxins  -renally dose medications  -consult Nephrology to resume HD while inpatient   home

## 2023-04-06 NOTE — H&P PST ADULT - BLOOD AVOIDANCE/RESTRICTIONS, PROFILE
Called pt to follow up on requested imaging. Pt's mailbox was full-unable to leave a voicemail. Will call again   none

## 2024-04-22 NOTE — ED PROVIDER NOTE - ENMT NEGATIVE STATEMENT, MLM
[de-identified] : Well developed, well nourished in no apparent distress, awake, alert and orientated to person, place and time with appropriate mood and affect Respirations are even and unlabored. Gait evaluation does not reveal a limp. There is no inguinal adenopathy. The affected limb is well-perfused with palpable pedal pulse, without skin lesions, shows a grossly normal motor and sensory examination. Incision is CDI Hip motion is full and painless throughout ROM. Leg lengths are approximately equal  [de-identified] : AP pelvis, AP hip, and lateral x-rays of the right hip were ordered and obtained in the office and demonstrate satisfactory position and alignment of the components are present. No signs of loosening are seen.  no ear pain

## 2024-04-24 NOTE — PHYSICAL EXAM
If any questions arise, call your provider.  If your provider is not available, please feel free to call the Surgical Center at [phone].    MEDICATIONS: Resume taking daily medication.  Take prescribed pain medication with food.  If no medication is prescribed, you may take non-aspirin pain medication if needed.  PAIN MEDICATION CAN BE VERY CONSTIPATING.  Take a stool softener or laxative such as senokot, pericolace, or milk of magnesia if needed.    Last pain medication given at 12:41pm 5mg Oxycodone    Follow-up with Dr. Santana as scheduled [phone]   Keep surgical dressings clean, dry and intact until otherwise instructed by surgeon.    What to Expect Post Anesthesia    Rest and take it easy for the first 24 hours.  A responsible adult is recommended to remain with you during that time.  It is normal to feel sleepy.  We encourage you to not do anything that requires balance, judgment or coordination.    FOR 24 HOURS DO NOT:  Drive, operate machinery or run household appliances.  Drink beer or alcoholic beverages.  Make important decisions or sign legal documents.    To avoid nausea, slowly advance diet as tolerated, avoiding spicy or greasy foods for the first day.  Add more substantial food to your diet according to your provider's instructions.  Babies can be fed formula or breast milk as soon as they are hungry.  INCREASE FLUIDS AND FIBER TO AVOID CONSTIPATION.    MILD FLU-LIKE SYMPTOMS ARE NORMAL.  YOU MAY EXPERIENCE GENERALIZED MUSCLE ACHES, THROAT IRRITATION, HEADACHE AND/OR SOME NAUSEA.    Discharge Education for patients on MARTINA (Obstructive Sleep Apnea) Protocol    Prior to receiving sedation or anesthesia, we screen all patients for Obstructive Sleep Apnea.  During your screening, you were identified as having suspected, but not confirmed Obstructive Sleep Apnea(MARTINA).    What is Obstructive Sleep Apnea?  Sleep apnea (AP-ne-ah) is a common disorder which involves breathing pauses that occur  [de-identified] : Posttreatment changes, no LAD. during sleep.  These can last from 10 seconds to a minute or longer.  Normal breathing resumes often with a loud snort or choking sound.    Sleep apnea occurs in all age groups and both genders but is more common in men and people over 40 years of age.  It has been estimated that as many as 18 million Americans have sleep apnea.  Most people who have sleep apnea don’t know they have it because it only occurs during sleep.  A family member and/or bed partner may first notice the signs of sleep apnea.  Sleep apnea is a chronic (ongoing) condition that disrupts the quality and quantity of your sleep repeatedly throughout the night.  This often results in excessive daytime sleepiness or fatigue during the day.  It may also contribute to high blood pressure, heart problems, and complications following medications used for surgery and procedures.    To establish a definitive diagnosis, further testing from a specialist would be needed.  We recommend that you follow up with your primary care physician.    We recommend that you should be with an adult observer for at least 24 hours after your sedation/anesthesia.  If you have a CPAP machine, you should wear it during any sleep period (day or night) for the week following your procedure.  We encourage you to sleep on your side or in a sitting position, even with napping.  Lying flat on your back increases the risk of apnea and airway obstruction during your post procedure recovery period.    It is important to prevent over-sedation that could increase your risk for apnea.  Please take all pain medication as directed by your physician.  If you are not getting pain relief, please contact your physician to discuss possible approaches to relieving pain while minimizing medications that can affect your breathing and oxygen levels.             [Laryngoscopy Performed] : laryngoscopy was performed, see procedure section for findings [FreeTextEntry1] : Flap is well healed, limited mobility, no concerning lesions in the OC/OPx on inspection or palpation.\par  [Normal] : no rashes
